# Patient Record
Sex: MALE | Race: WHITE | NOT HISPANIC OR LATINO | Employment: FULL TIME | URBAN - METROPOLITAN AREA
[De-identification: names, ages, dates, MRNs, and addresses within clinical notes are randomized per-mention and may not be internally consistent; named-entity substitution may affect disease eponyms.]

---

## 2017-01-25 ENCOUNTER — LAB VISIT (OUTPATIENT)
Dept: LAB | Facility: HOSPITAL | Age: 42
End: 2017-01-25
Attending: INTERNAL MEDICINE
Payer: COMMERCIAL

## 2017-01-25 ENCOUNTER — OFFICE VISIT (OUTPATIENT)
Dept: ENDOCRINOLOGY | Facility: CLINIC | Age: 42
End: 2017-01-25
Payer: COMMERCIAL

## 2017-01-25 ENCOUNTER — TELEPHONE (OUTPATIENT)
Dept: ENDOCRINOLOGY | Facility: CLINIC | Age: 42
End: 2017-01-25

## 2017-01-25 VITALS
HEART RATE: 76 BPM | SYSTOLIC BLOOD PRESSURE: 120 MMHG | WEIGHT: 219.44 LBS | DIASTOLIC BLOOD PRESSURE: 80 MMHG | HEIGHT: 71 IN | BODY MASS INDEX: 30.72 KG/M2

## 2017-01-25 DIAGNOSIS — E78.1 HYPERTRIGLYCERIDEMIA: ICD-10-CM

## 2017-01-25 DIAGNOSIS — E22.1 HYPERPROLACTINEMIA: ICD-10-CM

## 2017-01-25 DIAGNOSIS — E03.8 SECONDARY HYPOTHYROIDISM: ICD-10-CM

## 2017-01-25 DIAGNOSIS — R53.83 FATIGUE, UNSPECIFIED TYPE: ICD-10-CM

## 2017-01-25 DIAGNOSIS — E29.1 MALE HYPOGONADISM: ICD-10-CM

## 2017-01-25 DIAGNOSIS — E03.8 SECONDARY HYPOTHYROIDISM: Primary | ICD-10-CM

## 2017-01-25 LAB
25(OH)D3+25(OH)D2 SERPL-MCNC: 23 NG/ML
T4 FREE SERPL-MCNC: 0.84 NG/DL
TSH SERPL DL<=0.005 MIU/L-ACNC: 0.53 UIU/ML

## 2017-01-25 PROCEDURE — 36415 COLL VENOUS BLD VENIPUNCTURE: CPT | Mod: PO

## 2017-01-25 PROCEDURE — 82306 VITAMIN D 25 HYDROXY: CPT

## 2017-01-25 PROCEDURE — 99999 PR PBB SHADOW E&M-EST. PATIENT-LVL III: CPT | Mod: PBBFAC,,, | Performed by: INTERNAL MEDICINE

## 2017-01-25 PROCEDURE — 1159F MED LIST DOCD IN RCRD: CPT | Mod: S$GLB,,, | Performed by: INTERNAL MEDICINE

## 2017-01-25 PROCEDURE — 84439 ASSAY OF FREE THYROXINE: CPT

## 2017-01-25 PROCEDURE — 99214 OFFICE O/P EST MOD 30 MIN: CPT | Mod: S$GLB,,, | Performed by: INTERNAL MEDICINE

## 2017-01-25 PROCEDURE — 84443 ASSAY THYROID STIM HORMONE: CPT

## 2017-01-25 RX ORDER — ERGOCALCIFEROL 1.25 MG/1
50000 CAPSULE ORAL WEEKLY
Qty: 12 CAPSULE | Refills: 1 | Status: SHIPPED | OUTPATIENT
Start: 2017-01-25 | End: 2017-02-15 | Stop reason: SDUPTHER
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
## 2017-01-25 NOTE — MR AVS SNAPSHOT
[facility]  [address]  [facility] [address]  Phone: [phone]  Fax: [phone]                  Khadar Lorenzo   2017 8:00 AM   Office Visit    Description:  Male : 1975   Provider:  Robert Castillo MD   Department:  [facility]           Reason for Visit     hyperprolactinemia     Hypothyroidism           Diagnoses this Visit        Comments    Secondary hypothyroidism    -  Primary     Hypertriglyceridemia         BMI 29.0-29.9,adult         Hyperprolactinemia         Male hypogonadism         Fatigue, unspecified type                To Do List           Future Appointments        Provider Department Dept Phone    2017 10:35 AM LAB, [place] [facility] [phone]      Goals (5 Years of Data)     None      [facility]     [facility] Care [facility] -  Assistance  Registered nurses in the [facility] provide clinical advisement, health education, appointment booking, and other advisory services.  Call for this free service at [phone].             Medications           Message regarding Medications     Verify the changes and/or additions to your medication regime listed below are the same as discussed with your clinician today.  If any of these changes or additions are incorrect, please notify your healthcare provider.             Verify that the below list of medications is an accurate representation of the medications you are currently taking.  If none reported, the list may be blank. If incorrect, please contact your healthcare provider. Carry this list with you in case of emergency.           Current Medications     buPROPion (WELLBUTRIN XL) 300 MG 24 hr tablet Take 1 tablet (300 mg total) by mouth once daily.    citalopram (CELEXA) 40 MG tablet Take 1 tablet (40 mg total) by mouth once daily.    cyclobenzaprine (FLEXERIL) 10 MG tablet Take 10 mg by mouth 3 (three) times daily as  "needed for Muscle spasms.    fenofibrate 160 MG Tab Take 1 tablet (160 mg total) by mouth once daily.    ketoconazole (NIZORAL) 2 % shampoo Apply 1 application topically twice a week.    levothyroxine (SYNTHROID) 75 MCG tablet Take 1 tablet (75 mcg total) by mouth once daily.    OLUX-E 0.05 % topical foam Apply 0.05 % topically continuous prn.    omeprazole (PRILOSEC) 40 MG capsule TAKE ONE CAPSULE BY MOUTH ONCE DAILY    tadalafil (CIALIS) 20 MG Tab Take 1 tablet (20 mg total) by mouth daily as needed.    testosterone cypionate (DEPOTESTOTERONE CYPIONATE) 200 mg/mL injection INJECT 150 MG (0.75 MILLILITERS) INTRAMUSCULARLY EVERY 7 DAYS           Clinical Reference Information           Vital Signs - Last Recorded  Most recent update: 1/25/2017  8:18 AM by Cristin Jordan LPN    BP Pulse Ht Wt BMI    120/80 (BP Method: Manual) 76 5' 11" (1.803 m) 99.6 kg (219 lb 7.5 oz) 30.61 kg/m2      Blood Pressure          Most Recent Value    BP  120/80      Allergies as of 1/25/2017     No Known Allergies      Immunizations Administered on Date of Encounter - 1/25/2017     None      Orders Placed During Today's Visit     Future Labs/Procedures Expected by Expires    Prolactin  1/25/2017 3/26/2018    T4, free  1/25/2017 3/26/2018    T4, free  1/25/2017 3/26/2018    TSH  1/25/2017 3/26/2018    TSH  1/25/2017 3/26/2018    Vitamin D  1/25/2017 3/26/2018    Vitamin D  1/25/2017 3/26/2018      "

## 2017-02-15 ENCOUNTER — PATIENT MESSAGE (OUTPATIENT)
Dept: FAMILY MEDICINE | Facility: CLINIC | Age: 42
End: 2017-02-15

## 2017-02-15 ENCOUNTER — PATIENT MESSAGE (OUTPATIENT)
Dept: ENDOCRINOLOGY | Facility: CLINIC | Age: 42
End: 2017-02-15

## 2017-02-15 RX ORDER — TESTOSTERONE CYPIONATE 200 MG/ML
INJECTION, SOLUTION INTRAMUSCULAR
Refills: 0 | Status: CANCELLED | OUTPATIENT
Start: 2017-02-15

## 2017-02-15 RX ORDER — TESTOSTERONE CYPIONATE 200 MG/ML
INJECTION, SOLUTION INTRAMUSCULAR
Qty: 10 ML | Refills: 0 | Status: SHIPPED | OUTPATIENT
Start: 2017-02-15 | End: 2017-04-20 | Stop reason: SDUPTHER

## 2017-02-15 RX ORDER — ERGOCALCIFEROL 1.25 MG/1
50000 CAPSULE ORAL WEEKLY
Qty: 12 CAPSULE | Refills: 1 | Status: SHIPPED | OUTPATIENT
Start: 2017-02-15 | End: 2017-05-04

## 2017-02-15 NOTE — TELEPHONE ENCOUNTER
Patient is requesting testosterone refill to be sent to [facility]. Sent message to patient to let him know it would be sent to pharmacy.     Refill request. Ok to fill? Please advise. Thank you.

## 2017-02-15 NOTE — TELEPHONE ENCOUNTER
Pt states he was not aware that he was prescribed a new med after labs.  Would like the ergo sent to [facility] in [place] instead.  Med pended and routed to Dr. Castillo.

## 2017-03-07 RX ORDER — FENOFIBRATE 160 MG/1
TABLET ORAL
Qty: 90 TABLET | Refills: 0 | Status: SHIPPED | OUTPATIENT
Start: 2017-03-07 | End: 2017-04-21 | Stop reason: SDUPTHER

## 2017-03-07 RX ORDER — OMEPRAZOLE 40 MG/1
CAPSULE, DELAYED RELEASE ORAL
Qty: 90 CAPSULE | Refills: 0 | Status: SHIPPED | OUTPATIENT
Start: 2017-03-07 | End: 2017-06-27 | Stop reason: SDUPTHER

## 2017-03-08 RX ORDER — KETOCONAZOLE 20 MG/ML
1 SHAMPOO, SUSPENSION TOPICAL
Qty: 120 ML | Refills: 5 | Status: SHIPPED | OUTPATIENT
Start: 2017-03-09 | End: 2018-06-20 | Stop reason: SDUPTHER

## 2017-03-21 RX ORDER — BUPROPION HYDROCHLORIDE 300 MG/1
TABLET ORAL
Qty: 90 TABLET | Refills: 0 | Status: SHIPPED | OUTPATIENT
Start: 2017-03-21 | End: 2017-06-27 | Stop reason: SDUPTHER

## 2017-03-23 RX ORDER — CITALOPRAM 40 MG/1
TABLET, FILM COATED ORAL
Qty: 90 TABLET | Refills: 0 | Status: SHIPPED | OUTPATIENT
Start: 2017-03-23 | End: 2017-06-28 | Stop reason: SDUPTHER

## 2017-04-20 RX ORDER — TESTOSTERONE CYPIONATE 200 MG/ML
INJECTION, SOLUTION INTRAMUSCULAR
Qty: 10 ML | Refills: 0 | Status: SHIPPED | OUTPATIENT
Start: 2017-04-20 | End: 2017-07-21 | Stop reason: SDUPTHER

## 2017-04-21 ENCOUNTER — OFFICE VISIT (OUTPATIENT)
Dept: FAMILY MEDICINE | Facility: CLINIC | Age: 42
End: 2017-04-21
Payer: COMMERCIAL

## 2017-04-21 VITALS
TEMPERATURE: 98 F | SYSTOLIC BLOOD PRESSURE: 122 MMHG | RESPIRATION RATE: 18 BRPM | OXYGEN SATURATION: 99 % | DIASTOLIC BLOOD PRESSURE: 88 MMHG | WEIGHT: 227.06 LBS | HEART RATE: 80 BPM | HEIGHT: 71 IN | BODY MASS INDEX: 31.79 KG/M2

## 2017-04-21 DIAGNOSIS — M94.0 ACUTE COSTOCHONDRITIS: Primary | ICD-10-CM

## 2017-04-21 DIAGNOSIS — E78.2 MIXED HYPERLIPIDEMIA: ICD-10-CM

## 2017-04-21 PROCEDURE — 1160F RVW MEDS BY RX/DR IN RCRD: CPT | Mod: S$GLB,,, | Performed by: NURSE PRACTITIONER

## 2017-04-21 PROCEDURE — 99214 OFFICE O/P EST MOD 30 MIN: CPT | Mod: S$GLB,,, | Performed by: NURSE PRACTITIONER

## 2017-04-21 RX ORDER — KETOROLAC TROMETHAMINE 10 MG/1
10 TABLET, FILM COATED ORAL 3 TIMES DAILY
Qty: 15 TABLET | Refills: 0 | Status: SHIPPED | OUTPATIENT
Start: 2017-04-21 | End: 2017-04-26

## 2017-04-21 NOTE — MR AVS SNAPSHOT
[facility]  [address] [address] [address]  [facility] [address]  Phone: [phone]  Fax: [phone]                  Khadar Lorenzo   2017 1:00 PM   Office Visit    Description:  Male : 1975   Provider:  Talita Strickland NP   Department:  [facility]           Reason for Visit     Chest Pain           Diagnoses this Visit        Comments    Acute costochondritis    -  Primary     Mixed hyperlipidemia                To Do List           Goals (5 Years of Data)     None       These Medications        Disp Refills Start End    ketorolac (TORADOL) 10 mg tablet 15 tablet 0 2017    Take 1 tablet (10 mg total) by mouth 3 (three) times daily. - Oral    Pharmacy: [facility] [id number]  #: [phone]         [facility]     [facility] On Call Nurse Care Line -  Assistance  Unless otherwise directed by your provider, please contact [facility], our nurse care line that is available for  assistance.     Registered nurses in the [facility] provide: appointment scheduling, clinical advisement, health education, and other advisory services.  Call: [phone] (toll free)               Medications           Message regarding Medications     Verify the changes and/or additions to your medication regime listed below are the same as discussed with your clinician today.  If any of these changes or additions are incorrect, please notify your healthcare provider.        START taking these NEW medications        Refills    ketorolac (TORADOL) 10 mg tablet 0    Sig: Take 1 tablet (10 mg total) by mouth 3 (three) times daily.    Class: Normal    Route: Oral           Verify that the below list of medications is an accurate representation of the medications you are currently taking.  If none reported, the list may be blank. If incorrect, please contact your healthcare  "provider. Carry this list with you in case of emergency.           Current Medications     buPROPion (WELLBUTRIN XL) 300 MG 24 hr tablet TAKE ONE TABLET BY MOUTH ONCE DAILY    citalopram (CELEXA) 40 MG tablet TAKE ONE TABLET BY MOUTH ONCE DAILY    ergocalciferol (ERGOCALCIFEROL) 50,000 unit Cap Take 1 capsule (50,000 Units total) by mouth once a week.    fenofibrate 160 MG Tab Take 1 tablet (160 mg total) by mouth once daily.    ketoconazole (NIZORAL) 2 % shampoo Apply 1 application topically twice a week.    levothyroxine (SYNTHROID) 75 MCG tablet Take 1 tablet (75 mcg total) by mouth once daily.    omeprazole (PRILOSEC) 40 MG capsule TAKE ONE CAPSULE BY MOUTH ONCE DAILY    testosterone cypionate (DEPOTESTOTERONE CYPIONATE) 200 mg/mL injection INJECT 150 MG (0.75 MILLILITERS) INTRAMUSCULARLY EVERY 7 DAYS    cyclobenzaprine (FLEXERIL) 10 MG tablet Take 10 mg by mouth 3 (three) times daily as needed for Muscle spasms.    ketorolac (TORADOL) 10 mg tablet Take 1 tablet (10 mg total) by mouth 3 (three) times daily.    OLUX-E 0.05 % topical foam Apply 0.05 % topically continuous prn.    tadalafil (CIALIS) 20 MG Tab Take 1 tablet (20 mg total) by mouth daily as needed.           Clinical Reference Information           Your Vitals Were     BP Pulse Temp Resp Height Weight    122/88 80 98.4 °F (36.9 °C) 18 5' 11" (1.803 m) 103 kg (227 lb 1.2 oz)    SpO2 BMI             99% 31.67 kg/m2         Blood Pressure          Most Recent Value    BP  122/88      Allergies as of 4/21/2017     No Known Allergies      Immunizations Administered on Date of Encounter - 4/21/2017     None      Orders Placed During Today's Visit     Future Labs/Procedures Expected by Expires    CBC auto differential  4/21/2017 4/22/2018    Comprehensive metabolic panel  4/21/2017 4/22/2018    Lipid panel  4/21/2017 4/22/2018      Language Assistance Services     ATTENTION: Language assistance services are available, free of charge. Please call " [phone].      ATENCIÓN: Si habla español, tiene a navarro disposición servicios gratuitos de asistencia lingüística. Llame al [phone].     CHÚ Ý: N?u b?n nói Ti?ng Vi?t, có các d?ch v? h? tr? ngôn ng? mi?n phí dành cho b?n. G?i s? [phone].         [facility] complies with applicable Federal civil rights laws and does not discriminate on the basis of race, color, national origin, age, disability, or sex.

## 2017-04-23 NOTE — PROGRESS NOTES
"Subjective:       Patient ID: Khadar Lorenzo is a 41 y.o. male.    Chief Complaint: Chest Pain    HPI He was just getting offshore when he has some pain in his left chest wall area. States it was with movement. Was concerned so was flown back in for the EMT's to check him out. He has normal EKG strip done but BP was a little elevated. He feels this was due to anxiety. He states he has soreness to the chest wall with movements. Had done some heavy lifting. Has not had any cold, allergy type symptoms. He states no radiation of pain into shoulder, jaw or down arm. States it is a constant soreness, worse with movement and feels it with palpation. NO other concerns. Due for labs. BP has been in good range. See ROS.    The following portion of the patients history was reviewed and updated as appropriate: allergies, current medications, past medical and surgical history. Past social history and problem list reviewed. Family PMH and Past social history reviewed. Tobacco, Illicit drug use reviewed.     Review of Systems    Constitutional: No fatigue or fever    HENT: no sore throat or nasal congestion. No voice changes  no sneezing or runny nose  Eyes: No vision changes, blurred vision  Skin: no rashes or lesions  Respiratory:   No SOB, Wheezing, cough  Cardiovascular:   No CP, Palpitations  Gastrointestinal:   No N/V/D. No abdominal pain, weight stable. Appetite good.   Musculoskeletal:   No joint pain  No change in gait or coordination. See HPI.  Neurological:   No dizziness. No headaches  Hematological: No abnormal bruising or bleeding    Psychiatric/Behavioral Negative for suicidal ideas.  Denies feelings of depression. No thoughts of wanting to harm self or others.     Objective:     /88  Pulse 80  Temp 98.4 °F (36.9 °C)  Resp 18  Ht 5' 11" (1.803 m)  Wt 103 kg (227 lb 1.2 oz)  SpO2 99%  BMI 31.67 kg/m2     Physical Exam    Constitutional: oriented to person, place, and time. well-developed and " well-nourished.   HENT:  Nares patent. Throat clear.   Head: Normocephalic.   Eyes: Conjunctivae are normal. Pupils are equal, round, and reactive to light.   Neck: Normal range of motion. Neck supple. No tracheal deviation present. No thyromegaly present. no enlarged or tender anterior cervical lymph nodes.  Cardiovascular: Normal rate, regular rhythm and normal heart sounds.  No murmurs or gallops.  Pulmonary/Chest: Effort normal and breath sounds normal. No respiratory distress. No wheezes. No rales or rhonchi  Abdominal: Soft. Bowel sounds are normal. No distension. There is no tenderness.   Musculoskeletal: Normal range of motion. Gait and coordination normal. Tenderness to chest wall to mid sternal and bilateral CW areas with palpation. Does not present as cardiac. More muscle strain.    Neurological: oriented to person, place, and time.   Skin: Skin is warm and dry. No rashes or lesions  Psychiatric: Normal mood and affect.Behavior is normal. Judgment and thought content normal.   Assessment:       1. Acute costochondritis    2. Mixed hyperlipidemia        Plan:         Khadar was seen today for chest pain.    Diagnoses and all orders for this visit:    Acute costochondritis:  toradol TID for 5 days. Avoid heavy lifting. If not improving or radiation of pain into left shoulder, down arm go to ER.     Mixed hyperlipidemia: Low fat, low carb diet. Due for routine labs.  -     CBC auto differential; Future  -     Comprehensive metabolic panel; Future  -     Lipid panel; Future    Other orders  -     ketorolac (TORADOL) 10 mg tablet; Take 1 tablet (10 mg total) by mouth 3 (three) times daily.    Continue current medication  Take medications only as prescribed  Healthy diet, exercise  Adequate rest  Adequate hydration  Avoid allergens  Avoid excessive caffeine

## 2017-06-07 ENCOUNTER — PATIENT MESSAGE (OUTPATIENT)
Dept: FAMILY MEDICINE | Facility: CLINIC | Age: 42
End: 2017-06-07

## 2017-06-07 ENCOUNTER — TELEPHONE (OUTPATIENT)
Dept: FAMILY MEDICINE | Facility: CLINIC | Age: 42
End: 2017-06-07

## 2017-06-07 DIAGNOSIS — L98.9 SKIN LESIONS, GENERALIZED: Primary | ICD-10-CM

## 2017-06-07 NOTE — TELEPHONE ENCOUNTER
Patient scheduled for soonest available with Dr. Chinchilla which he is currently unable to make due to conflicting work schedule, message sent to staff for earlier appointment. Beginning early September, Dr. Chinchilla will be out on maternity leave the remainder of the year. Please advise.

## 2017-06-07 NOTE — TELEPHONE ENCOUNTER
I put in referral to [facility], Dr. Terry. Please call him to schedule.

## 2017-06-07 NOTE — TELEPHONE ENCOUNTER
See below message from Dr. Chinchilla staff:    Sorry . We have nothing sooner . Will place on cancellation list . But we have so many on list , I doubt we can get in sooner. Pt's that want soon appt we are recommending Dr Betancourt in [place] .

## 2017-06-07 NOTE — TELEPHONE ENCOUNTER
Appt scheduled for soonest available, message sent to staff requesting earlier appointment. Attempted to reach patient via contact number provided, no answer. LM including contact information for return call. MyChart msg sent.

## 2017-06-27 NOTE — TELEPHONE ENCOUNTER
Celexa was routed to Dr. Rao, so I am unable to edit/remove. Unsure if you will be filling this also. All meds pended for 90 day supply.  Prilosec and Wellbutrin last refilled by your 3/2017  Celexa last filled by Dr. Rao 3/23/17.  LOV 4/21/17

## 2017-06-28 ENCOUNTER — PATIENT MESSAGE (OUTPATIENT)
Dept: FAMILY MEDICINE | Facility: CLINIC | Age: 42
End: 2017-06-28

## 2017-06-28 RX ORDER — BUPROPION HYDROCHLORIDE 300 MG/1
300 TABLET ORAL DAILY
Qty: 90 TABLET | Refills: 1 | Status: SHIPPED | OUTPATIENT
Start: 2017-06-28 | End: 2018-01-12 | Stop reason: SDUPTHER

## 2017-06-28 RX ORDER — FENOFIBRATE 160 MG/1
TABLET ORAL
Qty: 90 TABLET | Refills: 1 | Status: SHIPPED | OUTPATIENT
Start: 2017-06-28 | End: 2018-01-31 | Stop reason: SDUPTHER

## 2017-06-28 RX ORDER — CITALOPRAM 40 MG/1
40 TABLET, FILM COATED ORAL DAILY
Qty: 90 TABLET | Refills: 1 | Status: SHIPPED | OUTPATIENT
Start: 2017-06-28 | End: 2018-01-12 | Stop reason: SDUPTHER

## 2017-06-28 RX ORDER — OMEPRAZOLE 40 MG/1
40 CAPSULE, DELAYED RELEASE ORAL DAILY
Qty: 90 CAPSULE | Refills: 1 | Status: SHIPPED | OUTPATIENT
Start: 2017-06-28 | End: 2018-01-12 | Stop reason: SDUPTHER

## 2017-07-21 ENCOUNTER — PATIENT MESSAGE (OUTPATIENT)
Dept: FAMILY MEDICINE | Facility: CLINIC | Age: 42
End: 2017-07-21

## 2017-07-21 RX ORDER — TESTOSTERONE CYPIONATE 200 MG/ML
INJECTION, SOLUTION INTRAMUSCULAR
Qty: 10 ML | Refills: 0 | Status: SHIPPED | OUTPATIENT
Start: 2017-07-21 | End: 2017-10-27 | Stop reason: SDUPTHER

## 2017-08-01 ENCOUNTER — PATIENT MESSAGE (OUTPATIENT)
Dept: FAMILY MEDICINE | Facility: CLINIC | Age: 42
End: 2017-08-01

## 2017-08-04 ENCOUNTER — LAB VISIT (OUTPATIENT)
Dept: LAB | Facility: HOSPITAL | Age: 42
End: 2017-08-04
Payer: COMMERCIAL

## 2017-08-04 DIAGNOSIS — E78.2 MIXED HYPERLIPIDEMIA: ICD-10-CM

## 2017-08-04 LAB
ALBUMIN SERPL BCP-MCNC: 3.9 G/DL
ALP SERPL-CCNC: 43 U/L
ALT SERPL W/O P-5'-P-CCNC: 35 U/L
ANION GAP SERPL CALC-SCNC: 8 MMOL/L
AST SERPL-CCNC: 24 U/L
BASOPHILS # BLD AUTO: 0.04 K/UL
BASOPHILS NFR BLD: 0.5 %
BILIRUB SERPL-MCNC: 0.7 MG/DL
BUN SERPL-MCNC: 8 MG/DL
CALCIUM SERPL-MCNC: 9 MG/DL
CHLORIDE SERPL-SCNC: 104 MMOL/L
CHOLEST/HDLC SERPL: 4.8 {RATIO}
CO2 SERPL-SCNC: 27 MMOL/L
CREAT SERPL-MCNC: 1.1 MG/DL
DIFFERENTIAL METHOD: ABNORMAL
EOSINOPHIL # BLD AUTO: 0.2 K/UL
EOSINOPHIL NFR BLD: 2.4 %
ERYTHROCYTE [DISTWIDTH] IN BLOOD BY AUTOMATED COUNT: 13.5 %
EST. GFR  (AFRICAN AMERICAN): >60 ML/MIN/1.73 M^2
EST. GFR  (NON AFRICAN AMERICAN): >60 ML/MIN/1.73 M^2
GLUCOSE SERPL-MCNC: 100 MG/DL
HCT VFR BLD AUTO: 49 %
HDL/CHOLESTEROL RATIO: 20.9 %
HDLC SERPL-MCNC: 148 MG/DL
HDLC SERPL-MCNC: 31 MG/DL
HGB BLD-MCNC: 16.2 G/DL
LDLC SERPL CALC-MCNC: 72.6 MG/DL
LYMPHOCYTES # BLD AUTO: 2.9 K/UL
LYMPHOCYTES NFR BLD: 36.3 %
MCH RBC QN AUTO: 26.7 PG
MCHC RBC AUTO-ENTMCNC: 33.1 G/DL
MCV RBC AUTO: 81 FL
MONOCYTES # BLD AUTO: 0.7 K/UL
MONOCYTES NFR BLD: 8.3 %
NEUTROPHILS # BLD AUTO: 4.2 K/UL
NEUTROPHILS NFR BLD: 51.9 %
NONHDLC SERPL-MCNC: 117 MG/DL
PLATELET # BLD AUTO: 180 K/UL
PMV BLD AUTO: 9.8 FL
POTASSIUM SERPL-SCNC: 4.1 MMOL/L
PROT SERPL-MCNC: 6.6 G/DL
RBC # BLD AUTO: 6.07 M/UL
SODIUM SERPL-SCNC: 139 MMOL/L
TRIGL SERPL-MCNC: 222 MG/DL
WBC # BLD AUTO: 8 K/UL

## 2017-08-04 PROCEDURE — 80053 COMPREHEN METABOLIC PANEL: CPT

## 2017-08-04 PROCEDURE — 80061 LIPID PANEL: CPT

## 2017-08-04 PROCEDURE — 36415 COLL VENOUS BLD VENIPUNCTURE: CPT | Mod: PO

## 2017-08-04 PROCEDURE — 85025 COMPLETE CBC W/AUTO DIFF WBC: CPT

## 2017-08-07 ENCOUNTER — PATIENT MESSAGE (OUTPATIENT)
Dept: FAMILY MEDICINE | Facility: CLINIC | Age: 42
End: 2017-08-07

## 2017-08-08 ENCOUNTER — PATIENT MESSAGE (OUTPATIENT)
Dept: FAMILY MEDICINE | Facility: CLINIC | Age: 42
End: 2017-08-08

## 2017-08-28 RX ORDER — LEVOTHYROXINE SODIUM 75 UG/1
75 TABLET ORAL DAILY
Qty: 30 TABLET | Refills: 11 | Status: SHIPPED | OUTPATIENT
Start: 2017-08-28 | End: 2018-10-19 | Stop reason: SDUPTHER

## 2017-10-24 RX ORDER — TESTOSTERONE CYPIONATE 200 MG/ML
INJECTION, SOLUTION INTRAMUSCULAR
Qty: 10 ML | Refills: 0 | OUTPATIENT
Start: 2017-10-24

## 2017-10-24 NOTE — TELEPHONE ENCOUNTER
He is due to follow up with endocrinology.  Please advise him to schedule that appointment.  He is  Due for his 6 month visit with me. Testosterone is a controlled medication and he has to be seen every 6 months to continue getting it.  Please have him schedule appointment and will give refills at that visit.

## 2017-10-27 ENCOUNTER — TELEPHONE (OUTPATIENT)
Dept: FAMILY MEDICINE | Facility: CLINIC | Age: 42
End: 2017-10-27

## 2017-10-27 ENCOUNTER — OFFICE VISIT (OUTPATIENT)
Dept: FAMILY MEDICINE | Facility: CLINIC | Age: 42
End: 2017-10-27
Payer: COMMERCIAL

## 2017-10-27 VITALS
DIASTOLIC BLOOD PRESSURE: 86 MMHG | HEIGHT: 71 IN | RESPIRATION RATE: 12 BRPM | BODY MASS INDEX: 30.69 KG/M2 | WEIGHT: 219.19 LBS | HEART RATE: 76 BPM | SYSTOLIC BLOOD PRESSURE: 126 MMHG | TEMPERATURE: 98 F

## 2017-10-27 DIAGNOSIS — E78.2 MIXED HYPERLIPIDEMIA: ICD-10-CM

## 2017-10-27 DIAGNOSIS — E34.9 HYPOTESTOSTERONEMIA: Primary | ICD-10-CM

## 2017-10-27 DIAGNOSIS — E03.4 HYPOTHYROIDISM DUE TO ACQUIRED ATROPHY OF THYROID: ICD-10-CM

## 2017-10-27 DIAGNOSIS — Z12.5 PROSTATE CANCER SCREENING: ICD-10-CM

## 2017-10-27 LAB
ALBUMIN SERPL BCP-MCNC: 4.2 G/DL
ALP SERPL-CCNC: 51 U/L
ALT SERPL W/O P-5'-P-CCNC: 40 U/L
ANION GAP SERPL CALC-SCNC: 11 MMOL/L
AST SERPL-CCNC: 29 U/L
BASOPHILS # BLD AUTO: 0.07 K/UL
BASOPHILS NFR BLD: 1 %
BILIRUB SERPL-MCNC: 0.8 MG/DL
BUN SERPL-MCNC: 15 MG/DL
CALCIUM SERPL-MCNC: 9.6 MG/DL
CHLORIDE SERPL-SCNC: 102 MMOL/L
CHOLEST SERPL-MCNC: 136 MG/DL
CHOLEST/HDLC SERPL: 4 {RATIO}
CO2 SERPL-SCNC: 25 MMOL/L
COMPLEXED PSA SERPL-MCNC: 1.6 NG/ML
CREAT SERPL-MCNC: 1.3 MG/DL
DIFFERENTIAL METHOD: ABNORMAL
EOSINOPHIL # BLD AUTO: 0.1 K/UL
EOSINOPHIL NFR BLD: 1.2 %
ERYTHROCYTE [DISTWIDTH] IN BLOOD BY AUTOMATED COUNT: 13.4 %
EST. GFR  (AFRICAN AMERICAN): >60 ML/MIN/1.73 M^2
EST. GFR  (NON AFRICAN AMERICAN): >60 ML/MIN/1.73 M^2
GLUCOSE SERPL-MCNC: 91 MG/DL
HCT VFR BLD AUTO: 51.6 %
HDLC SERPL-MCNC: 34 MG/DL
HDLC SERPL: 25 %
HGB BLD-MCNC: 16.5 G/DL
IMM GRANULOCYTES # BLD AUTO: 0.04 K/UL
IMM GRANULOCYTES NFR BLD AUTO: 0.6 %
LDLC SERPL CALC-MCNC: 72 MG/DL
LYMPHOCYTES # BLD AUTO: 2.3 K/UL
LYMPHOCYTES NFR BLD: 33.7 %
MCH RBC QN AUTO: 26.5 PG
MCHC RBC AUTO-ENTMCNC: 32 G/DL
MCV RBC AUTO: 83 FL
MONOCYTES # BLD AUTO: 0.6 K/UL
MONOCYTES NFR BLD: 8.5 %
NEUTROPHILS # BLD AUTO: 3.8 K/UL
NEUTROPHILS NFR BLD: 55 %
NONHDLC SERPL-MCNC: 102 MG/DL
NRBC BLD-RTO: 0 /100 WBC
PLATELET # BLD AUTO: 233 K/UL
PMV BLD AUTO: 9.9 FL
POTASSIUM SERPL-SCNC: 4.4 MMOL/L
PROT SERPL-MCNC: 7.3 G/DL
RBC # BLD AUTO: 6.23 M/UL
SODIUM SERPL-SCNC: 138 MMOL/L
TESTOST SERPL-MCNC: 181 NG/DL
TRIGL SERPL-MCNC: 150 MG/DL
TSH SERPL DL<=0.005 MIU/L-ACNC: 0.92 UIU/ML
WBC # BLD AUTO: 6.83 K/UL

## 2017-10-27 PROCEDURE — 99214 OFFICE O/P EST MOD 30 MIN: CPT | Mod: S$GLB,,, | Performed by: NURSE PRACTITIONER

## 2017-10-27 PROCEDURE — 36415 COLL VENOUS BLD VENIPUNCTURE: CPT | Mod: S$GLB,,, | Performed by: INTERNAL MEDICINE

## 2017-10-27 RX ORDER — TESTOSTERONE CYPIONATE 200 MG/ML
INJECTION, SOLUTION INTRAMUSCULAR
Qty: 10 ML | Refills: 0 | Status: SHIPPED | OUTPATIENT
Start: 2017-10-27 | End: 2018-02-23 | Stop reason: SDUPTHER

## 2017-10-27 NOTE — PROGRESS NOTES
Venipuncture performed with 23 gauge butterfly, x's 1 attempt,  to L Cephalic vein.  Specimens collected per orders.      Pressure dressing applied to site, instructed patient to remove dressing in 10-15 minutes, OK to re-adjust dressing if pressure causing any discomfort, to observe closely for numbness and/or discoloration to hand or fingers, and to notify provider if bleeding persists after applying constant pressure lasting 30 minutes.

## 2017-10-27 NOTE — TELEPHONE ENCOUNTER
----- Message from Alanna Davison sent at 10/27/2017 10:43 AM CDT -----  Contact: Patient  Khadar, patient [phone] returning the nurse's call. Transferred to POD. Please advise. Thanks.

## 2017-10-31 NOTE — PROGRESS NOTES
"Subjective:       Patient ID: Khadar Lorenzo is a 41 y.o. male.    Chief Complaint: Medication Refill    HPI here for medication review and refills. States he is doing well. He has not had his testosterone in about two weeks so he is more fatigued. He finds it really makes a difference. He has been worked up by Urology and Endocrinology and approved to be on the testosterone. He is taking his synthroid as directed. He is due for routine labs. No oher concerns. See ROS.    The following portion of the patients history was reviewed and updated as appropriate: allergies, current medications, past medical and surgical history. Past social history and problem list reviewed. Family PMH and Past social history reviewed. Tobacco, Illicit drug use reviewed.     Review of Systems  Constitutional: No fatigue or fever    HENT: no sore throat or nasal congestion. No voice changes    Eyes: No vision changes, blurred vision  Skin: no rashes or lesions  Respiratory:   No SOB, Wheezing, cough  Cardiovascular:   No CP, Palpitations  Gastrointestinal:   No N/V/D. No abdominal pain, weight stable. Appetite good.   Genitourinary:   No dysuria, urgency or frequency. No change in bowels. No blood in stools.   Musculoskeletal:   No joint pain  No change in gait or coordination. .  Neurological:   No dizziness. No headaches  Hematological: No abnormal bruising or bleeding    Psychiatric/Behavioral Negative for suicidal ideas.  Denies feelings of depression. No thoughts of wanting to harm self or others.     Objective:     /86   Pulse 76   Temp 98.4 °F (36.9 °C) (Oral)   Resp 12   Ht 5' 11" (1.803 m)   Wt 99.4 kg (219 lb 3.2 oz)   BMI 30.57 kg/m²      Physical Exam     Constitutional: oriented to person, place, and time. well-developed and well-nourished.   HENT: normal  Head: Normocephalic.   Eyes: Conjunctivae are normal. Pupils are equal, round, and reactive to light.   Neck: Normal range of motion. Neck supple. No " tracheal deviation present. No thyromegaly present.   Cardiovascular: Normal rate, regular rhythm and normal heart sounds.    Pulmonary/Chest: Effort normal and breath sounds normal. No respiratory distress. No wheezes.   Abdominal: Soft. Bowel sounds are normal. No distension. There is no tenderness.   Musculoskeletal: Normal range of motion. Gait and coordination normal   Neurological: oriented to person, place, and time.   Skin: Skin is warm and dry. No rashes or lesions  Psychiatric: Normal mood and affect.Behavior is normal. Judgment and thought content normal.   Assessment:       1. Hypotestosteronemia    2. Mixed hyperlipidemia    3. Prostate cancer screening    4. Hypothyroidism due to acquired atrophy of thyroid        Plan:         Khadar was seen today for medication refill.    Diagnoses and all orders for this visit:    Hypotestosteronemia: will check testosterone level. Refill medication.   -     Testosterone; Future  -     Testosterone    Mixed hyperlipidemia: due for routine labs.  -     CBC auto differential  -     Comprehensive metabolic panel  -     Lipid panel    Prostate cancer screening  -     PSA, Screening    Hypothyroidism due to acquired atrophy of thyroid: tolerating medication well. Continue current dose for now. Due for labs.  -     TSH    Other orders  -     testosterone cypionate (DEPOTESTOTERONE CYPIONATE) 200 mg/mL injection; INJECT 150 MG (0.75 MILLILITERS) INTRAMUSCULARLY EVERY 7 DAYS    Continue current medication  Take medications only as prescribed  Healthy diet, exercise  Adequate rest  Adequate hydration  Avoid allergens  Avoid excessive caffeine

## 2017-12-21 ENCOUNTER — TELEPHONE (OUTPATIENT)
Dept: FAMILY MEDICINE | Facility: CLINIC | Age: 42
End: 2017-12-21

## 2017-12-21 NOTE — TELEPHONE ENCOUNTER
----- Message from David Burns sent at 12/20/2017 12:29 PM CST -----  Contact: Wife, Nyasia Murrell want to speak with a nurse regarding scheduling appointment for patient today for coughing and running fever please call back at [phone]

## 2017-12-21 NOTE — TELEPHONE ENCOUNTER
She can but it won't necessarily help.  There is always a  New strand and this vaccine will not guarantee she still won't get the flu.

## 2017-12-21 NOTE — TELEPHONE ENCOUNTER
----- Message from David Burns sent at 12/20/2017 12:29 PM CST -----  Contact: Wife, Nyasia Murrell want to speak with a nurse regarding scheduling appointment for patient today for coughing and running fever please call back at [phone]

## 2017-12-21 NOTE — TELEPHONE ENCOUNTER
Spoke to pt wife. Pt wife states that pt went to Urgent Care last night and was diagnosed with the flu. Pt wife states they gave pt tamiflu and states she got tamiflu as well. Pt wondering if she should get a flu shot? Please advise.

## 2017-12-21 NOTE — TELEPHONE ENCOUNTER
Pt called to schedule an appointment no answer message left to return call to clinic to schedule if needed

## 2017-12-26 ENCOUNTER — CLINICAL SUPPORT (OUTPATIENT)
Dept: URGENT CARE | Facility: CLINIC | Age: 42
End: 2017-12-26

## 2017-12-26 DIAGNOSIS — Z00.00 PHYSICAL EXAM: Primary | ICD-10-CM

## 2017-12-26 PROCEDURE — 99499 UNLISTED E&M SERVICE: CPT | Mod: S$GLB,,, | Performed by: PREVENTIVE MEDICINE

## 2018-01-12 RX ORDER — CITALOPRAM 40 MG/1
TABLET, FILM COATED ORAL
Qty: 90 TABLET | Refills: 1 | Status: SHIPPED | OUTPATIENT
Start: 2018-01-12 | End: 2018-08-02 | Stop reason: ALTCHOICE

## 2018-01-12 RX ORDER — BUPROPION HYDROCHLORIDE 300 MG/1
TABLET ORAL
Qty: 90 TABLET | Refills: 1 | Status: SHIPPED | OUTPATIENT
Start: 2018-01-12 | End: 2018-07-10 | Stop reason: SDUPTHER

## 2018-01-12 RX ORDER — OMEPRAZOLE 40 MG/1
CAPSULE, DELAYED RELEASE ORAL
Qty: 90 CAPSULE | Refills: 1 | Status: SHIPPED | OUTPATIENT
Start: 2018-01-12 | End: 2018-07-10 | Stop reason: SDUPTHER

## 2018-01-31 RX ORDER — FENOFIBRATE 160 MG/1
TABLET ORAL
Qty: 90 TABLET | Refills: 1 | Status: SHIPPED | OUTPATIENT
Start: 2018-01-31 | End: 2018-09-04 | Stop reason: SDUPTHER

## 2018-02-26 RX ORDER — TESTOSTERONE CYPIONATE 200 MG/ML
INJECTION, SOLUTION INTRAMUSCULAR
Qty: 10 ML | Refills: 0 | Status: SHIPPED | OUTPATIENT
Start: 2018-02-26 | End: 2018-06-21 | Stop reason: SDUPTHER

## 2018-06-20 RX ORDER — KETOCONAZOLE 20 MG/ML
SHAMPOO, SUSPENSION TOPICAL
Qty: 120 ML | Refills: 5 | Status: SHIPPED | OUTPATIENT
Start: 2018-06-20 | End: 2021-12-19 | Stop reason: CLARIF

## 2018-06-21 RX ORDER — TESTOSTERONE CYPIONATE 200 MG/ML
INJECTION, SOLUTION INTRAMUSCULAR
Qty: 10 ML | Refills: 0 | Status: SHIPPED | OUTPATIENT
Start: 2018-06-21 | End: 2018-11-05 | Stop reason: SDUPTHER

## 2018-07-11 RX ORDER — BUPROPION HYDROCHLORIDE 300 MG/1
TABLET ORAL
Qty: 90 TABLET | Refills: 1 | Status: SHIPPED | OUTPATIENT
Start: 2018-07-11 | End: 2018-12-18 | Stop reason: SDUPTHER

## 2018-07-11 RX ORDER — OMEPRAZOLE 40 MG/1
CAPSULE, DELAYED RELEASE ORAL
Qty: 90 CAPSULE | Refills: 1 | Status: SHIPPED | OUTPATIENT
Start: 2018-07-11 | End: 2019-01-29 | Stop reason: SDUPTHER

## 2018-08-02 ENCOUNTER — OFFICE VISIT (OUTPATIENT)
Dept: FAMILY MEDICINE | Facility: CLINIC | Age: 43
End: 2018-08-02
Payer: COMMERCIAL

## 2018-08-02 VITALS
HEART RATE: 72 BPM | BODY MASS INDEX: 30.55 KG/M2 | RESPIRATION RATE: 18 BRPM | HEIGHT: 71 IN | DIASTOLIC BLOOD PRESSURE: 78 MMHG | TEMPERATURE: 98 F | WEIGHT: 218.19 LBS | SYSTOLIC BLOOD PRESSURE: 124 MMHG

## 2018-08-02 DIAGNOSIS — F43.29 MIXED EMOTIONAL FEATURES AS ADJUSTMENT REACTION: ICD-10-CM

## 2018-08-02 DIAGNOSIS — Z00.00 ANNUAL PHYSICAL EXAM: Primary | ICD-10-CM

## 2018-08-02 DIAGNOSIS — E34.9 HYPOTESTOSTERONEMIA: ICD-10-CM

## 2018-08-02 DIAGNOSIS — E78.2 MIXED HYPERLIPIDEMIA: ICD-10-CM

## 2018-08-02 DIAGNOSIS — E03.4 HYPOTHYROIDISM DUE TO ACQUIRED ATROPHY OF THYROID: ICD-10-CM

## 2018-08-02 DIAGNOSIS — K21.9 GASTROESOPHAGEAL REFLUX DISEASE WITHOUT ESOPHAGITIS: ICD-10-CM

## 2018-08-02 DIAGNOSIS — L98.9 GENERALIZED SKIN LESIONS: ICD-10-CM

## 2018-08-02 PROCEDURE — 99396 PREV VISIT EST AGE 40-64: CPT | Mod: S$GLB,,, | Performed by: NURSE PRACTITIONER

## 2018-08-02 NOTE — PROGRESS NOTES
Subjective:       Patient ID: Khadar Lorenzo is a 42 y.o. male.    Chief Complaint: Annual Exam    HPI Patient is here for annual exam.  States he has been doing very well on current medications.  He has good blood pressure control.  He is doing well on the Wellbutrin for depression and anxiety.  He is not taking the citalopram any more.  He continues to get his testosterone injections, last testosterone level was 181.  He is due to repeat labs.  He has good control of his acid reflux with current medication.  He is due to repeat thyroid level.  States he is sleeping well.  He has some generalized skin lesions that he would like to see Dermatology for.  He has had have some moles removed in the past.  He denies any specific concerns today.  See review of systems.    The following portion of the patients history was reviewed and updated as appropriate: allergies, current medications, past medical and surgical history. Past social history and problem list reviewed. Family PMH and Past social history reviewed. Tobacco, Illicit drug use reviewed.     Review of Systems  Constitutional: No fatigue or fever    HENT: no sore throat or nasal congestion. No voice changes    Eyes: No vision changes, blurred vision  Skin: no rashes or lesions  Respiratory:   No SOB, Wheezing, cough  Cardiovascular:   No CP, Palpitations  Gastrointestinal:   No N/V/D. No abdominal pain, weight stable. Appetite good.   Genitourinary:   No dysuria, urgency or frequency. No change in bowels. No blood in stools.   Musculoskeletal:   No joint pain  No change in gait or coordination. .  Neurological:   No dizziness. No headaches  Hematological: No abnormal bruising or bleeding    Psychiatric/Behavioral Negative for suicidal ideas.  Denies feelings of depression. No thoughts of wanting to harm self or others.     Objective:     /78 (BP Location: Left arm, Patient Position: Sitting, BP Method: Medium (Manual))   Pulse 72   Temp 97.8 °F  "(36.6 °C) (Oral)   Resp 18   Ht 5' 11" (1.803 m)   Wt 99 kg (218 lb 3.2 oz)   BMI 30.43 kg/m²      Physical Exam     Constitutional: oriented to person, place, and time. well-developed and well-nourished.   Head: Normocephalic.   Eyes: Conjunctivae are normal. Pupils are equal, round, and reactive to light.   Neck: Normal range of motion. Neck supple. No tracheal deviation present. No thyromegaly present.   Cardiovascular: Normal rate, regular rhythm and normal heart sounds.    Pulmonary/Chest: Effort normal and breath sounds normal. No respiratory distress. No wheezes.   Abdominal: Soft. Bowel sounds are normal. No distension. There is no tenderness.   Musculoskeletal: Normal range of motion.  Gait and coordination normal.   Neurological: oriented to person, place, and time.   Skin: Skin is warm and dry. No rashes or lesions  Psychiatric: Normal mood and affect.Behavior is normal. Judgment and thought content normal.  he does not present as a threat to himself or others the  Assessment:       1. Annual physical exam    2. Hypothyroidism due to acquired atrophy of thyroid    3. Mixed hyperlipidemia    4. Hypotestosteronemia    5. Generalized skin lesions    6. Mixed emotional features as adjustment reaction    7. Gastroesophageal reflux disease without esophagitis        Plan:         Khadar was seen today for annual exam.    Diagnoses and all orders for this visit:    Annual physical exam    Hypothyroidism due to acquired atrophy of thyroid: doing well on current medication and dosage. Check labs.  -     TSH; Future    Mixed hyperlipidemia: follow low fat, low carb diet. Exercise. Repeat labs. Continue current medication and dosage.  -     CBC auto differential; Future  -     Comprehensive metabolic panel; Future  -     Lipid panel; Future    Hypotestosteronemia: continue medication per Urology.  -     Testosterone; Future    Generalized skin lesions: will refer to Dermatology for skin check.   -     " Ambulatory consult to Dermatology    Mixed emotional features as adjustment reaction: doing well on the Wellbutrin. Continue current medication and dosage.    Gastroesophageal reflux disease without esophagitis: well controlled with current medications. Avoid spicy, acidic foods. Do not lay down within an hour of eating.     Continue current medication  Take medications only as prescribed  Healthy diet, exercise  Adequate rest  Adequate hydration  Avoid allergens  Avoid excessive caffeine

## 2018-08-03 ENCOUNTER — LAB VISIT (OUTPATIENT)
Dept: LAB | Facility: HOSPITAL | Age: 43
End: 2018-08-03
Attending: NURSE PRACTITIONER
Payer: COMMERCIAL

## 2018-08-03 DIAGNOSIS — E78.2 MIXED HYPERLIPIDEMIA: ICD-10-CM

## 2018-08-03 DIAGNOSIS — E03.4 HYPOTHYROIDISM DUE TO ACQUIRED ATROPHY OF THYROID: ICD-10-CM

## 2018-08-03 DIAGNOSIS — E34.9 HYPOTESTOSTERONEMIA: ICD-10-CM

## 2018-08-03 LAB
ALBUMIN SERPL BCP-MCNC: 4.4 G/DL
ALP SERPL-CCNC: 53 U/L
ALT SERPL W/O P-5'-P-CCNC: 50 U/L
ANION GAP SERPL CALC-SCNC: 8 MMOL/L
AST SERPL-CCNC: 24 U/L
BASOPHILS # BLD AUTO: 0.07 K/UL
BASOPHILS NFR BLD: 1 %
BILIRUB SERPL-MCNC: 0.7 MG/DL
BUN SERPL-MCNC: 10 MG/DL
CALCIUM SERPL-MCNC: 10 MG/DL
CHLORIDE SERPL-SCNC: 103 MMOL/L
CHOLEST SERPL-MCNC: 135 MG/DL
CHOLEST/HDLC SERPL: 5 {RATIO}
CO2 SERPL-SCNC: 31 MMOL/L
CREAT SERPL-MCNC: 1.2 MG/DL
DIFFERENTIAL METHOD: ABNORMAL
EOSINOPHIL # BLD AUTO: 0.1 K/UL
EOSINOPHIL NFR BLD: 1.5 %
ERYTHROCYTE [DISTWIDTH] IN BLOOD BY AUTOMATED COUNT: 13.6 %
EST. GFR  (AFRICAN AMERICAN): >60 ML/MIN/1.73 M^2
EST. GFR  (NON AFRICAN AMERICAN): >60 ML/MIN/1.73 M^2
GLUCOSE SERPL-MCNC: 105 MG/DL
HCT VFR BLD AUTO: 49.4 %
HDLC SERPL-MCNC: 27 MG/DL
HDLC SERPL: 20 %
HGB BLD-MCNC: 15.8 G/DL
IMM GRANULOCYTES # BLD AUTO: 0.06 K/UL
IMM GRANULOCYTES NFR BLD AUTO: 0.9 %
LDLC SERPL CALC-MCNC: 67.2 MG/DL
LYMPHOCYTES # BLD AUTO: 2.3 K/UL
LYMPHOCYTES NFR BLD: 34.1 %
MCH RBC QN AUTO: 28.2 PG
MCHC RBC AUTO-ENTMCNC: 32 G/DL
MCV RBC AUTO: 88 FL
MONOCYTES # BLD AUTO: 0.6 K/UL
MONOCYTES NFR BLD: 8.7 %
NEUTROPHILS # BLD AUTO: 3.7 K/UL
NEUTROPHILS NFR BLD: 53.8 %
NONHDLC SERPL-MCNC: 108 MG/DL
NRBC BLD-RTO: 0 /100 WBC
PLATELET # BLD AUTO: 224 K/UL
PMV BLD AUTO: 9.9 FL
POTASSIUM SERPL-SCNC: 4.2 MMOL/L
PROT SERPL-MCNC: 7.2 G/DL
RBC # BLD AUTO: 5.61 M/UL
SODIUM SERPL-SCNC: 142 MMOL/L
TESTOST SERPL-MCNC: 353 NG/DL
TRIGL SERPL-MCNC: 204 MG/DL
TSH SERPL DL<=0.005 MIU/L-ACNC: 2.54 UIU/ML
WBC # BLD AUTO: 6.81 K/UL

## 2018-08-03 PROCEDURE — 36415 COLL VENOUS BLD VENIPUNCTURE: CPT | Mod: PO

## 2018-08-03 PROCEDURE — 80061 LIPID PANEL: CPT

## 2018-08-03 PROCEDURE — 80053 COMPREHEN METABOLIC PANEL: CPT

## 2018-08-03 PROCEDURE — 84403 ASSAY OF TOTAL TESTOSTERONE: CPT

## 2018-08-03 PROCEDURE — 84443 ASSAY THYROID STIM HORMONE: CPT

## 2018-08-03 PROCEDURE — 85025 COMPLETE CBC W/AUTO DIFF WBC: CPT

## 2018-09-04 RX ORDER — FENOFIBRATE 160 MG/1
160 TABLET ORAL DAILY
Qty: 90 TABLET | Refills: 1 | Status: SHIPPED | OUTPATIENT
Start: 2018-09-04 | End: 2019-05-09 | Stop reason: SDUPTHER

## 2018-10-08 ENCOUNTER — PATIENT MESSAGE (OUTPATIENT)
Dept: FAMILY MEDICINE | Facility: CLINIC | Age: 43
End: 2018-10-08

## 2018-10-09 RX ORDER — TADALAFIL 20 MG/1
20 TABLET ORAL DAILY PRN
Qty: 10 TABLET | Refills: 5 | Status: SHIPPED | OUTPATIENT
Start: 2018-10-09 | End: 2018-10-18 | Stop reason: ALTCHOICE

## 2018-10-18 ENCOUNTER — TELEPHONE (OUTPATIENT)
Dept: FAMILY MEDICINE | Facility: CLINIC | Age: 43
End: 2018-10-18

## 2018-10-18 RX ORDER — SILDENAFIL 100 MG/1
100 TABLET, FILM COATED ORAL DAILY PRN
COMMUNITY
End: 2018-10-18 | Stop reason: SDUPTHER

## 2018-10-18 RX ORDER — SILDENAFIL 100 MG/1
100 TABLET, FILM COATED ORAL DAILY PRN
Qty: 6 TABLET | Refills: 6 | Status: SHIPPED | OUTPATIENT
Start: 2018-10-18 | End: 2019-05-02 | Stop reason: SDUPTHER

## 2018-10-18 NOTE — TELEPHONE ENCOUNTER
Received a PA request for Cialis 20 mg  Cover My Meds: Key [id number]    Went to start a PA, I was unable to retrieve the PA form.    I called the [facility], the pharmacist.  She stated that she has been talking with Mr. Whittington and they can get Sildenafil for a lesser price.     They are requesting Sildenafil 100 mg.    Please advise    I have pended a order if ok.    If ok, please send to [facility].

## 2018-10-19 DIAGNOSIS — E03.4 HYPOTHYROIDISM DUE TO ACQUIRED ATROPHY OF THYROID: Primary | ICD-10-CM

## 2018-10-19 NOTE — TELEPHONE ENCOUNTER
Received fax from pharmacy asking to switch tadalafil/cialis to sildenafil 100mg tablets.  Tadalafil/cialis is not covered by pts ins and it is very costly they said.  Please advise.  Thanks.

## 2018-10-21 RX ORDER — LEVOTHYROXINE SODIUM 75 UG/1
75 TABLET ORAL DAILY
Qty: 30 TABLET | Refills: 11 | Status: SHIPPED | OUTPATIENT
Start: 2018-10-21 | End: 2019-12-03 | Stop reason: SDUPTHER

## 2018-11-05 RX ORDER — TESTOSTERONE CYPIONATE 200 MG/ML
INJECTION, SOLUTION INTRAMUSCULAR
Qty: 10 ML | Refills: 0 | Status: SHIPPED | OUTPATIENT
Start: 2018-11-05 | End: 2019-02-06 | Stop reason: SDUPTHER

## 2018-11-27 ENCOUNTER — INITIAL CONSULT (OUTPATIENT)
Dept: DERMATOLOGY | Facility: CLINIC | Age: 43
End: 2018-11-27
Payer: COMMERCIAL

## 2018-11-27 DIAGNOSIS — Z86.018 HISTORY OF DYSPLASTIC NEVUS: ICD-10-CM

## 2018-11-27 DIAGNOSIS — D22.9 MULTIPLE BENIGN NEVI: Primary | ICD-10-CM

## 2018-11-27 DIAGNOSIS — D22.9 MULTIPLE ATYPICAL NEVI: ICD-10-CM

## 2018-11-27 PROCEDURE — 99203 OFFICE O/P NEW LOW 30 MIN: CPT | Mod: S$GLB,,, | Performed by: DERMATOLOGY

## 2018-11-27 PROCEDURE — 99999 PR PBB SHADOW E&M-EST. PATIENT-LVL III: CPT | Mod: PBBFAC,,, | Performed by: DERMATOLOGY

## 2018-11-27 NOTE — PROGRESS NOTES
Subjective:       Patient ID:  Khadar Lorenzo is a 42 y.o. male who presents for   Chief Complaint   Patient presents with    Skin Check     TBSE    Spot     back     Initial visit  Intense sun exposure in childhood  H/o lesion excised on back ([facility]), unsure of path, thinks may have been skin cancer, 10+ years ago  Requests TBSE for cancer screening  Complaint below      Spot  - Initial  Affected locations: back  Duration: years.  Signs and Symptoms: raised.  Severity: mild  Timing: constant  Aggravated by: nothing  Relieving factors/Treatments tried: nothing        Review of Systems   Constitutional: Negative for fever, chills and fatigue.   Skin: Positive for activity-related sunscreen use and wears hat. Negative for daily sunscreen use.   Hematologic/Lymphatic: Does not bruise/bleed easily.        Objective:    Physical Exam   Constitutional: He appears well-developed and well-nourished. No distress.   Neurological: He is alert and oriented to person, place, and time. He is not disoriented.   Psychiatric: He has a normal mood and affect.   Skin:   Areas Examined (abnormalities noted in diagram):   Scalp / Hair Palpated and Inspected  Head / Face Inspection Performed  Neck Inspection Performed  Chest / Axilla Inspection Performed  Abdomen Inspection Performed  Genitals / Buttocks / Groin Inspection Performed  Back Inspection Performed  RUE Inspected  LUE Inspection Performed  RLE Inspected  LLE Inspection Performed  Nails and Digits Inspection Performed                  Diagram Legend     Erythematous scaling macule/papule c/w actinic keratosis       Vascular papule c/w angioma      Pigmented verrucoid papule/plaque c/w seborrheic keratosis      Yellow umbilicated papule c/w sebaceous hyperplasia      Irregularly shaped tan macule c/w lentigo     1-2 mm smooth white papules consistent with Milia      Movable subcutaneous cyst with punctum c/w epidermal inclusion cyst      Subcutaneous movable  cyst c/w pilar cyst      Firm pink to brown papule c/w dermatofibroma      Pedunculated fleshy papule(s) c/w skin tag(s)      Evenly pigmented macule c/w junctional nevus     Mildly variegated pigmented, slightly irregular-bordered macule c/w mildly atypical nevus      Flesh colored to evenly pigmented papule c/w intradermal nevus       Pink pearly papule/plaque c/w basal cell carcinoma      Erythematous hyperkeratotic cursted plaque c/w SCC      Surgical scar with no sign of skin cancer recurrence      Open and closed comedones      Inflammatory papules and pustules      Verrucoid papule consistent consistent with wart     Erythematous eczematous patches and plaques     Dystrophic onycholytic nail with subungual debris c/w onychomycosis     Umbilicated papule    Erythematous-base heme-crusted tan verrucoid plaque consistent with inflamed seborrheic keratosis     Erythematous Silvery Scaling Plaque c/w Psoriasis     See annotation    Lesion 1 (left upper back)    Lesion 2 midline lower back              Assessment / Plan:        Multiple benign nevi  Discussed ABCDE's of nevi.  Monitor for new mole or moles that are becoming bigger, darker, irritated, or developing irregular borders. Brochure provided.    Multiple atypical nevi  Large nevi with some clinical atypia, no melanoma specific features on dermoscopy, monitor for change    Advise patient to ask wife to take picture of back with cell phone, home checks for change  Reevaluate in 6 months or sooner if concern arises    History of dysplastic nevus  Area of previous DN (mid back) examined. Site well healed with no signs of recurrence.  Total body skin examination performed today including at least 12 points as noted in physical examination.     Patient instructed in importance in daily sun protection of at least spf 30. Mineral sunscreen ingredients preferred (Zinc +/- Titanium).   Recommend Elta MD for daily use on face and neck.  Patient encouraged to wear hat  for all outdoor exposure.   Also discussed sun avoidance and use of protective clothing.                 Follow-up in about 6 months (around 5/27/2019).

## 2018-11-27 NOTE — LETTER
November 27, 2018      Talita Strickland, PEDRITO  [address]  [place] [facility] [address]           [facility]  [address]  [place] [address]  Phone: [phone]          Patient: Khadar Lorenzo   MR Number: [id number]   YOB: 1975   Date of Visit: 11/27/2018       Dear Talita Strickland:    Thank you for referring Khadar Lorenzo to me for evaluation. Attached you will find relevant portions of my assessment and plan of care.    If you have questions, please do not hesitate to call me. I look forward to following Khadar Lorenzo along with you.    Sincerely,    Yenni Betancourt MD    Enclosure  CC:  No Recipients    If you would like to receive this communication electronically, please contact [email or web address] or [phone] to request more information on [employer] access.    For providers and/or their staff who would like to refer a patient to [facility], please contact us through our one-stop-shop provider referral line, [facility], at [phone].    If you feel you have received this communication in error or would no longer like to receive these types of communications, please e-mail [email or web address]

## 2018-12-07 ENCOUNTER — TELEPHONE (OUTPATIENT)
Dept: FAMILY MEDICINE | Facility: CLINIC | Age: 43
End: 2018-12-07

## 2018-12-07 NOTE — TELEPHONE ENCOUNTER
Received fax from pharmacy requesting a refill on omeprazole 40mg with a note written by pharmacist asking if pt would benefit from nexium.  Called pt and left him a message asking him call us back at [phone] to clarify which medication he would like.

## 2018-12-18 RX ORDER — BUPROPION HYDROCHLORIDE 300 MG/1
TABLET ORAL
Qty: 90 TABLET | Refills: 0 | Status: SHIPPED | OUTPATIENT
Start: 2018-12-18 | End: 2019-03-30 | Stop reason: SDUPTHER

## 2019-01-30 RX ORDER — OMEPRAZOLE 40 MG/1
CAPSULE, DELAYED RELEASE ORAL
Qty: 90 CAPSULE | Refills: 0 | Status: SHIPPED | OUTPATIENT
Start: 2019-01-30 | End: 2019-05-02 | Stop reason: SDUPTHER

## 2019-02-06 DIAGNOSIS — Z12.5 PROSTATE CANCER SCREENING: Primary | ICD-10-CM

## 2019-02-06 RX ORDER — TESTOSTERONE CYPIONATE 200 MG/ML
INJECTION, SOLUTION INTRAMUSCULAR
Qty: 10 ML | Refills: 0 | Status: SHIPPED | OUTPATIENT
Start: 2019-02-06 | End: 2019-06-04 | Stop reason: SDUPTHER

## 2019-02-06 NOTE — TELEPHONE ENCOUNTER
I will refill the testosterone but he is past due for his PSA level since taking testosterone can increase prostate issues. Orders placed, please ask him to schedule.

## 2019-02-11 NOTE — TELEPHONE ENCOUNTER
Pt is active on [employer]. Provider's message sent via patient portal with a flag to notify me if not read in one week.

## 2019-02-28 ENCOUNTER — PATIENT MESSAGE (OUTPATIENT)
Dept: FAMILY MEDICINE | Facility: CLINIC | Age: 44
End: 2019-02-28

## 2019-03-11 ENCOUNTER — LAB VISIT (OUTPATIENT)
Dept: LAB | Facility: HOSPITAL | Age: 44
End: 2019-03-11
Attending: NURSE PRACTITIONER
Payer: COMMERCIAL

## 2019-03-11 DIAGNOSIS — Z12.5 PROSTATE CANCER SCREENING: ICD-10-CM

## 2019-03-11 LAB — COMPLEXED PSA SERPL-MCNC: 1.7 NG/ML

## 2019-03-11 PROCEDURE — 36415 COLL VENOUS BLD VENIPUNCTURE: CPT | Mod: PO

## 2019-03-11 PROCEDURE — 84153 ASSAY OF PSA TOTAL: CPT

## 2019-03-12 ENCOUNTER — PATIENT MESSAGE (OUTPATIENT)
Dept: FAMILY MEDICINE | Facility: CLINIC | Age: 44
End: 2019-03-12

## 2019-04-01 RX ORDER — BUPROPION HYDROCHLORIDE 300 MG/1
300 TABLET ORAL DAILY
Qty: 30 TABLET | Refills: 0 | Status: SHIPPED | OUTPATIENT
Start: 2019-04-01 | End: 2019-07-01 | Stop reason: SDUPTHER

## 2019-04-01 RX ORDER — BUPROPION HYDROCHLORIDE 300 MG/1
TABLET ORAL
Qty: 90 TABLET | Refills: 30 | Status: SHIPPED | OUTPATIENT
Start: 2019-04-01 | End: 2019-04-01 | Stop reason: SDUPTHER

## 2019-04-01 NOTE — TELEPHONE ENCOUNTER
He is past due for his 6 month follow up. One refill given to hold him until he can come see me. Sent 30 day supply to [facility]

## 2019-04-01 NOTE — TELEPHONE ENCOUNTER
Patient was informed that requested prescription was filled, also, 6 month follow up appointment was scheduled.

## 2019-04-09 ENCOUNTER — OFFICE VISIT (OUTPATIENT)
Dept: FAMILY MEDICINE | Facility: CLINIC | Age: 44
End: 2019-04-09
Payer: COMMERCIAL

## 2019-04-09 VITALS
BODY MASS INDEX: 30.1 KG/M2 | HEIGHT: 71 IN | SYSTOLIC BLOOD PRESSURE: 120 MMHG | WEIGHT: 215 LBS | HEART RATE: 74 BPM | DIASTOLIC BLOOD PRESSURE: 60 MMHG | RESPIRATION RATE: 18 BRPM | TEMPERATURE: 98 F | OXYGEN SATURATION: 95 %

## 2019-04-09 DIAGNOSIS — E29.1 MALE HYPOGONADISM: ICD-10-CM

## 2019-04-09 DIAGNOSIS — N52.9 ERECTILE DYSFUNCTION, UNSPECIFIED ERECTILE DYSFUNCTION TYPE: ICD-10-CM

## 2019-04-09 DIAGNOSIS — E78.1 HYPERTRIGLYCERIDEMIA: Primary | ICD-10-CM

## 2019-04-09 DIAGNOSIS — E03.4 HYPOTHYROIDISM DUE TO ACQUIRED ATROPHY OF THYROID: ICD-10-CM

## 2019-04-09 DIAGNOSIS — K21.9 GASTROESOPHAGEAL REFLUX DISEASE WITHOUT ESOPHAGITIS: ICD-10-CM

## 2019-04-09 DIAGNOSIS — E34.9 HYPOTESTOSTERONEMIA: ICD-10-CM

## 2019-04-09 DIAGNOSIS — F43.29 MIXED EMOTIONAL FEATURES AS ADJUSTMENT REACTION: ICD-10-CM

## 2019-04-09 LAB
ALBUMIN SERPL BCP-MCNC: 4.2 G/DL (ref 3.5–5.2)
ALP SERPL-CCNC: 54 U/L (ref 55–135)
ALT SERPL W/O P-5'-P-CCNC: 32 U/L (ref 10–44)
ANION GAP SERPL CALC-SCNC: 9 MMOL/L (ref 8–16)
AST SERPL-CCNC: 21 U/L (ref 10–40)
BASOPHILS # BLD AUTO: 0.07 K/UL (ref 0–0.2)
BASOPHILS NFR BLD: 1.1 % (ref 0–1.9)
BILIRUB SERPL-MCNC: 0.5 MG/DL (ref 0.1–1)
BUN SERPL-MCNC: 9 MG/DL (ref 6–20)
CALCIUM SERPL-MCNC: 10.1 MG/DL (ref 8.7–10.5)
CHLORIDE SERPL-SCNC: 104 MMOL/L (ref 95–110)
CHOLEST SERPL-MCNC: 153 MG/DL (ref 120–199)
CHOLEST/HDLC SERPL: 5.5 {RATIO} (ref 2–5)
CO2 SERPL-SCNC: 27 MMOL/L (ref 23–29)
CREAT SERPL-MCNC: 1.2 MG/DL (ref 0.5–1.4)
DIFFERENTIAL METHOD: ABNORMAL
EOSINOPHIL # BLD AUTO: 0.1 K/UL (ref 0–0.5)
EOSINOPHIL NFR BLD: 1.7 % (ref 0–8)
ERYTHROCYTE [DISTWIDTH] IN BLOOD BY AUTOMATED COUNT: 12.9 % (ref 11.5–14.5)
EST. GFR  (AFRICAN AMERICAN): >60 ML/MIN/1.73 M^2
EST. GFR  (NON AFRICAN AMERICAN): >60 ML/MIN/1.73 M^2
GLUCOSE SERPL-MCNC: 108 MG/DL (ref 70–110)
HCT VFR BLD AUTO: 51.4 % (ref 40–54)
HDLC SERPL-MCNC: 28 MG/DL (ref 40–75)
HDLC SERPL: 18.3 % (ref 20–50)
HGB BLD-MCNC: 16.2 G/DL (ref 14–18)
IMM GRANULOCYTES # BLD AUTO: 0.03 K/UL (ref 0–0.04)
IMM GRANULOCYTES NFR BLD AUTO: 0.5 % (ref 0–0.5)
LDLC SERPL CALC-MCNC: 82.4 MG/DL (ref 63–159)
LYMPHOCYTES # BLD AUTO: 2.3 K/UL (ref 1–4.8)
LYMPHOCYTES NFR BLD: 34.3 % (ref 18–48)
MCH RBC QN AUTO: 27.4 PG (ref 27–31)
MCHC RBC AUTO-ENTMCNC: 31.5 G/DL (ref 32–36)
MCV RBC AUTO: 87 FL (ref 82–98)
MONOCYTES # BLD AUTO: 0.5 K/UL (ref 0.3–1)
MONOCYTES NFR BLD: 7.6 % (ref 4–15)
NEUTROPHILS # BLD AUTO: 3.6 K/UL (ref 1.8–7.7)
NEUTROPHILS NFR BLD: 54.8 % (ref 38–73)
NONHDLC SERPL-MCNC: 125 MG/DL
NRBC BLD-RTO: 0 /100 WBC
PLATELET # BLD AUTO: 262 K/UL (ref 150–350)
PMV BLD AUTO: 10.1 FL (ref 9.2–12.9)
POTASSIUM SERPL-SCNC: 4.2 MMOL/L (ref 3.5–5.1)
PROT SERPL-MCNC: 7.2 G/DL (ref 6–8.4)
RBC # BLD AUTO: 5.92 M/UL (ref 4.6–6.2)
SODIUM SERPL-SCNC: 140 MMOL/L (ref 136–145)
TESTOST SERPL-MCNC: 271 NG/DL (ref 304–1227)
TRIGL SERPL-MCNC: 213 MG/DL (ref 30–150)
TSH SERPL DL<=0.005 MIU/L-ACNC: 1.51 UIU/ML (ref 0.4–4)
WBC # BLD AUTO: 6.58 K/UL (ref 3.9–12.7)

## 2019-04-09 PROCEDURE — 84443 ASSAY THYROID STIM HORMONE: CPT

## 2019-04-09 PROCEDURE — 85025 COMPLETE CBC W/AUTO DIFF WBC: CPT

## 2019-04-09 PROCEDURE — 80053 COMPREHEN METABOLIC PANEL: CPT

## 2019-04-09 PROCEDURE — 36415 PR COLLECTION VENOUS BLOOD,VENIPUNCTURE: ICD-10-PCS | Mod: S$GLB,,, | Performed by: NURSE PRACTITIONER

## 2019-04-09 PROCEDURE — 3008F BODY MASS INDEX DOCD: CPT | Mod: CPTII,S$GLB,, | Performed by: NURSE PRACTITIONER

## 2019-04-09 PROCEDURE — 80061 LIPID PANEL: CPT

## 2019-04-09 PROCEDURE — 84403 ASSAY OF TOTAL TESTOSTERONE: CPT

## 2019-04-09 PROCEDURE — 3008F PR BODY MASS INDEX (BMI) DOCUMENTED: ICD-10-PCS | Mod: CPTII,S$GLB,, | Performed by: NURSE PRACTITIONER

## 2019-04-09 PROCEDURE — 99214 OFFICE O/P EST MOD 30 MIN: CPT | Mod: S$GLB,,, | Performed by: NURSE PRACTITIONER

## 2019-04-09 PROCEDURE — 36415 COLL VENOUS BLD VENIPUNCTURE: CPT | Mod: S$GLB,,, | Performed by: NURSE PRACTITIONER

## 2019-04-09 PROCEDURE — 99214 PR OFFICE/OUTPT VISIT, EST, LEVL IV, 30-39 MIN: ICD-10-PCS | Mod: S$GLB,,, | Performed by: NURSE PRACTITIONER

## 2019-04-09 NOTE — PROGRESS NOTES
Subjective:       Patient ID: Khadar Lorenzo is a 43 y.o. male.    Chief Complaint: Follow-up (6 month follow up with labs prior)    HPI here for 6 month follow up on several issues. States he is doing well. Taking his medication as prescribed.   1. Hypertriglyceridemia: will draw his labs today. He is taking fenofibrate 160mg daily. Last labs done 8 months ago showed triglycerides at 204. He is trying to follow low fat, low carb diet and exercise.   2. Hypotestosterone: He is taking the testosterone replacement as prescribed by Urology. I do give him the refills in between his follow ups with them. He feels his current dose works well. Will check PSA level and testosterone levels.   3. Hypothyroid: due to recheck on TSH level. Last check he was stable. He denies any concerning symptoms related to his thyroid.  4. Male Hypogonadism: testosterone replacement.  5. GERD: doing well on the prilosec and diet.  6. Anxiety/depression: doing well on the wellbutrin. Takes as prescribed. Denies any thoughts of wanting to harm himself or others.  7. ED: uses viagra on PRN basis with good results.   8: Dermatitis: followed by Dermatology. Uses medications prescribed by her with good results.   No specific concerns today. See ROS    The following portion of the patients history was reviewed and updated as appropriate: allergies, current medications, past medical and surgical history. Past social history and problem list reviewed. Family PMH and Past social history reviewed. Tobacco, Illicit drug use reviewed.     Review of Systems   Constitutional: Negative for activity change, appetite change, chills, fatigue, fever and unexpected weight change.   HENT: Negative for congestion, hearing loss, mouth sores, postnasal drip, rhinorrhea, sneezing, trouble swallowing and voice change.    Eyes: Negative for redness and visual disturbance.   Respiratory: Negative for cough, choking, chest tightness, shortness of breath and  "wheezing.    Cardiovascular: Negative for chest pain, palpitations and leg swelling.   Gastrointestinal: Negative for abdominal distention, abdominal pain, blood in stool, constipation, diarrhea, nausea and vomiting.   Endocrine: Negative for cold intolerance, heat intolerance, polydipsia, polyphagia and polyuria.   Genitourinary: Negative for decreased urine volume, difficulty urinating, flank pain, frequency and urgency.   Musculoskeletal: Negative for arthralgias, back pain and gait problem.   Skin: Negative for pallor, rash and wound.   Allergic/Immunologic: Negative for environmental allergies and food allergies.   Neurological: Negative for dizziness, tremors, seizures, speech difficulty, weakness and headaches.   Hematological: Negative for adenopathy. Does not bruise/bleed easily.   Psychiatric/Behavioral: Negative for agitation, behavioral problems, confusion, decreased concentration, dysphoric mood, self-injury, sleep disturbance and suicidal ideas. The patient is not nervous/anxious.        Objective:     /60   Pulse 74   Temp 98.2 °F (36.8 °C) (Oral)   Resp 18   Ht 5' 11" (1.803 m)   Wt 97.5 kg (215 lb)   SpO2 95%   BMI 29.99 kg/m²      Physical Exam   Constitutional: He is oriented to person, place, and time. He appears well-developed and well-nourished. He is cooperative. No distress.   HENT:   Head: Normocephalic and atraumatic.   Right Ear: Tympanic membrane, external ear and ear canal normal.   Left Ear: Tympanic membrane, external ear and ear canal normal.   Nose: No mucosal edema, rhinorrhea or sinus tenderness.   Mouth/Throat: Uvula is midline, oropharynx is clear and moist and mucous membranes are normal. No oropharyngeal exudate, posterior oropharyngeal edema or posterior oropharyngeal erythema.   Eyes: Pupils are equal, round, and reactive to light. Conjunctivae, EOM and lids are normal. Right eye exhibits no discharge and no exudate. Left eye exhibits no discharge and no exudate. "   Neck: Trachea normal, normal range of motion and full passive range of motion without pain. Neck supple. No JVD present. No tracheal tenderness present. Carotid bruit is not present. No tracheal deviation present. No thyroid mass and no thyromegaly present.   Cardiovascular: Normal rate, regular rhythm, S1 normal, S2 normal, normal heart sounds and normal pulses.   No murmur heard.  Pulmonary/Chest: Effort normal and breath sounds normal. He has no wheezes. He has no rhonchi. He has no rales. He exhibits no tenderness.   Abdominal: Soft. Normal appearance and bowel sounds are normal. He exhibits no distension and no abdominal bruit. There is no hepatosplenomegaly. There is no tenderness. No hernia.   Musculoskeletal: Normal range of motion.   Lymphadenopathy:     He has no cervical adenopathy.        Right cervical: No superficial cervical adenopathy present.       Left cervical: No superficial cervical adenopathy present.     He has no axillary adenopathy.        Right: No supraclavicular adenopathy present.        Left: No supraclavicular adenopathy present.   Neurological: He is alert and oriented to person, place, and time. He has normal strength. He displays no tremor.   Skin: Skin is warm and dry. Capillary refill takes less than 2 seconds. No rash noted.   Psychiatric: He has a normal mood and affect. His speech is normal and behavior is normal. Judgment and thought content normal. His mood appears not anxious. Cognition and memory are normal. He does not exhibit a depressed mood.       Assessment:       1. Hypertriglyceridemia    2. Hypotestosteronemia    3. Hypothyroidism due to acquired atrophy of thyroid    4. Male hypogonadism    5. Gastroesophageal reflux disease without esophagitis    6. Mixed emotional features as adjustment reaction    7. Erectile dysfunction, unspecified erectile dysfunction type        Plan:         Khadar was seen today for follow-up.    Diagnoses and all orders for this  visit:    Hypertriglyceridemia: continue current dose of tricor. Will check labs. Follow low fat, low carb diet. Exercise.   -     CBC auto differential  -     Comprehensive metabolic panel  -     Lipid panel    Hypotestosteronemia: stable. Due for labs. Continue with current dose of testosterone. Follow with Urology.   -     Testosterone; Future  -     Testosterone    Hypothyroidism due to acquired atrophy of thyroid: good control. Due for repeat labs. Continue with current dose of synthroid at 75mcg. Take medication alone.   -     TSH    Male hypogonadism: continue to follow with urology. Testosterone as prescribed.    Gastroesophageal reflux disease without esophagitis: well controlled on prilosec. Take as directed. Avoid acid producing foods. Do not lay down within two hours of heating.     Mixed emotional features as adjustment reaction: doing well on the Wellbutrin XL 300mg daily.     Erectile dysfunction, unspecified erectile dysfunction type: good results with PRN use of the viagra.    Dermatitis: continue to follow with Dermatology. Continue medications given by her. Good control.     Continue current medication  Take medications only as prescribed  Healthy diet, exercise  Adequate rest  Adequate hydration  Avoid allergens  Avoid excessive caffeine    Current Outpatient Medications   Medication Sig Dispense Refill    buPROPion (WELLBUTRIN XL) 300 MG 24 hr tablet Take 1 tablet (300 mg total) by mouth once daily. 30 tablet 0    cyclobenzaprine (FLEXERIL) 10 MG tablet Take 10 mg by mouth 3 (three) times daily as needed for Muscle spasms.      fenofibrate 160 MG Tab Take 1 tablet (160 mg total) by mouth once daily. 90 tablet 1    ketoconazole (NIZORAL) 2 % shampoo APPLY ONE APPLICATION TOPICALLY TWICE A WEEK 120 mL 5    levothyroxine (SYNTHROID) 75 MCG tablet Take 1 tablet (75 mcg total) by mouth once daily. 30 tablet 11    OLUX-E 0.05 % topical foam Apply 0.05 % topically continuous prn.      omeprazole  (PRILOSEC) 40 MG capsule TAKE ONE CAPSULE BY MOUTH EVERY DAY 90 capsule 0    sildenafil (VIAGRA) 100 MG tablet Take 1 tablet (100 mg total) by mouth daily as needed for Erectile Dysfunction. 6 tablet 6    testosterone cypionate (DEPOTESTOTERONE CYPIONATE) 200 mg/mL injection INJECT 150 MG (0.75 ML) INTRAMUSCULARLY EVERY 7 DAYS 10 mL 0     Current Facility-Administered Medications   Medication Dose Route Frequency Provider Last Rate Last Dose    triamcinolone acetonide injection 80 mg  80 mg Intra-articular 1 time in Clinic/HOD Arben Rao MD

## 2019-04-10 ENCOUNTER — PATIENT MESSAGE (OUTPATIENT)
Dept: FAMILY MEDICINE | Facility: CLINIC | Age: 44
End: 2019-04-10

## 2019-05-02 DIAGNOSIS — E78.1 HYPERTRIGLYCERIDEMIA: Primary | ICD-10-CM

## 2019-05-02 RX ORDER — OMEPRAZOLE 40 MG/1
CAPSULE, DELAYED RELEASE ORAL
Qty: 90 CAPSULE | Refills: 1 | Status: SHIPPED | OUTPATIENT
Start: 2019-05-02 | End: 2019-12-03 | Stop reason: SDUPTHER

## 2019-05-02 RX ORDER — SILDENAFIL 100 MG/1
TABLET, FILM COATED ORAL
Qty: 6 TABLET | Refills: 6 | Status: SHIPPED | OUTPATIENT
Start: 2019-05-02 | End: 2020-06-01

## 2019-05-06 ENCOUNTER — OFFICE VISIT (OUTPATIENT)
Dept: URGENT CARE | Facility: CLINIC | Age: 44
End: 2019-05-06
Payer: COMMERCIAL

## 2019-05-06 VITALS
OXYGEN SATURATION: 97 % | HEART RATE: 78 BPM | SYSTOLIC BLOOD PRESSURE: 126 MMHG | RESPIRATION RATE: 16 BRPM | TEMPERATURE: 98 F | WEIGHT: 215 LBS | HEIGHT: 71 IN | DIASTOLIC BLOOD PRESSURE: 83 MMHG | BODY MASS INDEX: 30.1 KG/M2

## 2019-05-06 DIAGNOSIS — L08.9 LOCALIZED INFECTION OF SKIN: Primary | ICD-10-CM

## 2019-05-06 PROCEDURE — 3008F BODY MASS INDEX DOCD: CPT | Mod: CPTII,S$GLB,, | Performed by: FAMILY MEDICINE

## 2019-05-06 PROCEDURE — 99214 OFFICE O/P EST MOD 30 MIN: CPT | Mod: S$GLB,,, | Performed by: FAMILY MEDICINE

## 2019-05-06 PROCEDURE — 99214 PR OFFICE/OUTPT VISIT, EST, LEVL IV, 30-39 MIN: ICD-10-PCS | Mod: S$GLB,,, | Performed by: FAMILY MEDICINE

## 2019-05-06 PROCEDURE — 3008F PR BODY MASS INDEX (BMI) DOCUMENTED: ICD-10-PCS | Mod: CPTII,S$GLB,, | Performed by: FAMILY MEDICINE

## 2019-05-06 RX ORDER — MUPIROCIN 20 MG/G
OINTMENT TOPICAL
Qty: 22 G | Refills: 1 | Status: SHIPPED | OUTPATIENT
Start: 2019-05-06 | End: 2019-10-22

## 2019-05-06 RX ORDER — SULFAMETHOXAZOLE AND TRIMETHOPRIM 800; 160 MG/1; MG/1
1 TABLET ORAL 2 TIMES DAILY
Qty: 20 TABLET | Refills: 0 | Status: SHIPPED | OUTPATIENT
Start: 2019-05-06 | End: 2019-10-22 | Stop reason: ALTCHOICE

## 2019-05-06 NOTE — PROGRESS NOTES
"Subjective:       Patient ID: Khadar Lorenzo is a 43 y.o. male.    Vitals:  height is 5' 11" (1.803 m) and weight is 97.5 kg (215 lb). His temperature is 98.2 °F (36.8 °C). His blood pressure is 126/83 and his pulse is 78. His respiration is 16 and oxygen saturation is 97%.     Chief Complaint: Insect Bite    Pt states he was bitten on left ankle by an unknown insect x 4 days. Pt states the area is sore and seems to be getting worse.    Insect Bite   This is a new problem. The current episode started in the past 7 days. The problem occurs constantly. The problem has been gradually worsening. Pertinent negatives include no arthralgias, chest pain, chills, congestion, coughing, fatigue, fever, headaches, joint swelling, myalgias, nausea, rash, sore throat, vertigo or vomiting.       Constitution: Negative for chills, fatigue and fever.   HENT: Negative for congestion and sore throat.    Neck: Negative for painful lymph nodes.   Cardiovascular: Negative for chest pain and leg swelling.   Eyes: Negative for double vision and blurred vision.   Respiratory: Negative for cough and shortness of breath.    Gastrointestinal: Negative for nausea, vomiting and diarrhea.   Genitourinary: Negative for dysuria, frequency and urgency.   Musculoskeletal: Positive for pain. Negative for joint pain, joint swelling, muscle cramps and muscle ache.   Skin: Positive for erythema. Negative for color change, pale and rash.   Allergic/Immunologic: Negative for seasonal allergies.   Neurological: Negative for dizziness, history of vertigo, light-headedness, passing out and headaches.   Hematologic/Lymphatic: Negative for swollen lymph nodes, easy bruising/bleeding and history of blood clots. Does not bruise/bleed easily.   Psychiatric/Behavioral: Negative for nervous/anxious, sleep disturbance and depression. The patient is not nervous/anxious.        Objective:      Physical Exam   Constitutional: He is oriented to person, place, " and time. He appears well-developed and well-nourished.   HENT:   Head: Normocephalic and atraumatic. Head is without abrasion, without contusion and without laceration.   Nose: Nose normal.   Eyes: Pupils are equal, round, and reactive to light. Conjunctivae, EOM and lids are normal.   Neck: Trachea normal, full passive range of motion without pain and phonation normal. Neck supple.   Cardiovascular: Normal rate.   Pulmonary/Chest: Effort normal. No stridor. No respiratory distress.   Musculoskeletal: Normal range of motion.   Neurological: He is alert and oriented to person, place, and time.   Skin: Skin is warm, dry and intact. Capillary refill takes less than 2 seconds. Lesion noted. No abrasion, no bruising, no burn, no ecchymosis, no laceration and no rash noted. There is erythema.        Psychiatric: He has a normal mood and affect. His speech is normal and behavior is normal. Judgment and thought content normal. Cognition and memory are normal.   Nursing note and vitals reviewed.      Assessment:       1. Localized infection of skin        Plan:         Localized infection of skin    Other orders  -     mupirocin (BACTROBAN) 2 % ointment; Apply to affected area 3 times daily  Dispense: 22 g; Refill: 1  -     sulfamethoxazole-trimethoprim 800-160mg (BACTRIM DS) 800-160 mg Tab; Take 1 tablet by mouth 2 (two) times daily.  Dispense: 20 tablet; Refill: 0

## 2019-05-09 ENCOUNTER — TELEPHONE (OUTPATIENT)
Dept: URGENT CARE | Facility: CLINIC | Age: 44
End: 2019-05-09

## 2019-05-10 ENCOUNTER — OFFICE VISIT (OUTPATIENT)
Dept: DERMATOLOGY | Facility: CLINIC | Age: 44
End: 2019-05-10
Payer: COMMERCIAL

## 2019-05-10 VITALS — BODY MASS INDEX: 30.09 KG/M2 | WEIGHT: 214.94 LBS | HEIGHT: 71 IN

## 2019-05-10 DIAGNOSIS — Z86.018 HISTORY OF DYSPLASTIC NEVUS: ICD-10-CM

## 2019-05-10 DIAGNOSIS — D22.9 MULTIPLE BENIGN NEVI: ICD-10-CM

## 2019-05-10 DIAGNOSIS — D22.9 MULTIPLE ATYPICAL NEVI: Primary | ICD-10-CM

## 2019-05-10 PROCEDURE — 99999 PR PBB SHADOW E&M-EST. PATIENT-LVL III: CPT | Mod: PBBFAC,,, | Performed by: DERMATOLOGY

## 2019-05-10 PROCEDURE — 3008F PR BODY MASS INDEX (BMI) DOCUMENTED: ICD-10-PCS | Mod: CPTII,S$GLB,, | Performed by: DERMATOLOGY

## 2019-05-10 PROCEDURE — 3008F BODY MASS INDEX DOCD: CPT | Mod: CPTII,S$GLB,, | Performed by: DERMATOLOGY

## 2019-05-10 PROCEDURE — 99213 PR OFFICE/OUTPT VISIT, EST, LEVL III, 20-29 MIN: ICD-10-PCS | Mod: S$GLB,,, | Performed by: DERMATOLOGY

## 2019-05-10 PROCEDURE — 99999 PR PBB SHADOW E&M-EST. PATIENT-LVL III: ICD-10-PCS | Mod: PBBFAC,,, | Performed by: DERMATOLOGY

## 2019-05-10 PROCEDURE — 99213 OFFICE O/P EST LOW 20 MIN: CPT | Mod: S$GLB,,, | Performed by: DERMATOLOGY

## 2019-05-10 RX ORDER — FENOFIBRATE 160 MG/1
160 TABLET ORAL DAILY
Qty: 90 TABLET | Refills: 1 | Status: SHIPPED | OUTPATIENT
Start: 2019-05-10 | End: 2019-12-03 | Stop reason: SDUPTHER

## 2019-05-10 NOTE — PROGRESS NOTES
"  Subjective:       Patient ID:  Khadar Lorenzo is a 43 y.o. male who presents for   Chief Complaint   Patient presents with    Skin Check     UBSE    Spot     L ankle     Last seen 11/2018 with multiple clinically atypical nevi  Here for reevaluation    Intense sun exposure in childhood  H/o lesion excised on back ([facility]), unsure of path, thinks may have been skin cancer, 10+ years ago  Requests UBSE for cancer screening    Recent "spider bite" to ankle, on bactrim, resolving        Spot  - Initial  Affected locations: L ankle.  Duration: 2 weeks  Signs / symptoms: redness  Severity: mild  Timing: constant  Aggravated by: nothing  Treatments tried: Bactroban ointment.  Improvement on treatment: significant        Review of Systems   Constitutional: Negative for fever, chills and fatigue.   Skin: Positive for wears hat. Negative for daily sunscreen use and activity-related sunscreen use.   Hematologic/Lymphatic: Does not bruise/bleed easily.        Objective:    Physical Exam   Constitutional: He appears well-developed and well-nourished. No distress.   Neurological: He is alert and oriented to person, place, and time. He is not disoriented.   Psychiatric: He has a normal mood and affect.   Skin:   Areas Examined (abnormalities noted in diagram):   Scalp / Hair Palpated and Inspected  Head / Face Inspection Performed  Neck Inspection Performed  Chest / Axilla Inspection Performed  Abdomen Inspection Performed  Back Inspection Performed  RUE Inspected  LUE Inspection Performed  Nails and Digits Inspection Performed                  Diagram Legend     Erythematous scaling macule/papule c/w actinic keratosis       Vascular papule c/w angioma      Pigmented verrucoid papule/plaque c/w seborrheic keratosis      Yellow umbilicated papule c/w sebaceous hyperplasia      Irregularly shaped tan macule c/w lentigo     1-2 mm smooth white papules consistent with Milia      Movable subcutaneous cyst " with punctum c/w epidermal inclusion cyst      Subcutaneous movable cyst c/w pilar cyst      Firm pink to brown papule c/w dermatofibroma      Pedunculated fleshy papule(s) c/w skin tag(s)      Evenly pigmented macule c/w junctional nevus     Mildly variegated pigmented, slightly irregular-bordered macule c/w mildly atypical nevus      Flesh colored to evenly pigmented papule c/w intradermal nevus       Pink pearly papule/plaque c/w basal cell carcinoma      Erythematous hyperkeratotic cursted plaque c/w SCC      Surgical scar with no sign of skin cancer recurrence      Open and closed comedones      Inflammatory papules and pustules      Verrucoid papule consistent consistent with wart     Erythematous eczematous patches and plaques     Dystrophic onycholytic nail with subungual debris c/w onychomycosis     Umbilicated papule    Erythematous-base heme-crusted tan verrucoid plaque consistent with inflamed seborrheic keratosis     Erythematous Silvery Scaling Plaque c/w Psoriasis     See annotation   left upper back (lesion 1)- unchanged    Midline mid back-             Assessment / Plan:        Multiple atypical nevi  Multiple benign nevi    Discussed ABCDE's of nevi.  Monitor for new mole or moles that are becoming bigger, darker, irritated, or developing irregular borders. Brochure provided.    Large nevi with some clinical atypia, no melanoma specific features on dermoscopy, no change noted from prior evaluation 11/2018    Advise patient to ask wife to take picture of back with cell phone, home checks for change  Reevaluate in 6 months or sooner if concern arises    History of dysplastic nevus  Area of previous DN (mid back) examined. Site well healed with no signs of recurrence.  Total body skin examination performed today including at least 12 points as noted in physical examination.     Patient instructed in importance in daily sun protection of at least spf 30. Mineral sunscreen ingredients preferred (Zinc  +/- Titanium).   Recommend Elta MD for daily use on face and neck.  Patient encouraged to wear hat for all outdoor exposure.   Also discussed sun avoidance and use of protective clothing.    Discussed full body photography- Sampson Lehman           Follow up in about 6 months (around 11/10/2019).

## 2019-06-04 RX ORDER — TESTOSTERONE CYPIONATE 200 MG/ML
INJECTION, SOLUTION INTRAMUSCULAR
Qty: 10 ML | Refills: 0 | Status: SHIPPED | OUTPATIENT
Start: 2019-06-04 | End: 2019-10-01 | Stop reason: SDUPTHER

## 2019-07-01 RX ORDER — BUPROPION HYDROCHLORIDE 300 MG/1
TABLET ORAL
Qty: 30 TABLET | Refills: 4 | Status: SHIPPED | OUTPATIENT
Start: 2019-07-01 | End: 2020-06-30

## 2019-07-03 ENCOUNTER — PATIENT OUTREACH (OUTPATIENT)
Dept: ADMINISTRATIVE | Facility: HOSPITAL | Age: 44
End: 2019-07-03

## 2019-10-02 RX ORDER — TESTOSTERONE CYPIONATE 200 MG/ML
INJECTION, SOLUTION INTRAMUSCULAR
Qty: 10 ML | Refills: 0 | Status: SHIPPED | OUTPATIENT
Start: 2019-10-02 | End: 2020-02-19 | Stop reason: SDUPTHER

## 2019-10-03 ENCOUNTER — PATIENT OUTREACH (OUTPATIENT)
Dept: ADMINISTRATIVE | Facility: HOSPITAL | Age: 44
End: 2019-10-03

## 2019-10-22 ENCOUNTER — OFFICE VISIT (OUTPATIENT)
Dept: FAMILY MEDICINE | Facility: CLINIC | Age: 44
End: 2019-10-22
Payer: COMMERCIAL

## 2019-10-22 VITALS
BODY MASS INDEX: 30.31 KG/M2 | RESPIRATION RATE: 16 BRPM | TEMPERATURE: 98 F | WEIGHT: 216.5 LBS | HEART RATE: 78 BPM | HEIGHT: 71 IN | DIASTOLIC BLOOD PRESSURE: 86 MMHG | OXYGEN SATURATION: 97 % | SYSTOLIC BLOOD PRESSURE: 122 MMHG

## 2019-10-22 DIAGNOSIS — E34.9 HYPOTESTOSTERONEMIA: ICD-10-CM

## 2019-10-22 DIAGNOSIS — F41.9 ANXIETY: ICD-10-CM

## 2019-10-22 DIAGNOSIS — E78.5 HYPERLIPIDEMIA, UNSPECIFIED HYPERLIPIDEMIA TYPE: Primary | ICD-10-CM

## 2019-10-22 DIAGNOSIS — Z12.5 PROSTATE CANCER SCREENING: ICD-10-CM

## 2019-10-22 DIAGNOSIS — E03.4 HYPOTHYROIDISM DUE TO ACQUIRED ATROPHY OF THYROID: ICD-10-CM

## 2019-10-22 DIAGNOSIS — K21.9 GASTROESOPHAGEAL REFLUX DISEASE WITHOUT ESOPHAGITIS: ICD-10-CM

## 2019-10-22 LAB
ALBUMIN SERPL BCP-MCNC: 4.3 G/DL (ref 3.5–5.2)
ALP SERPL-CCNC: 44 U/L (ref 55–135)
ALT SERPL W/O P-5'-P-CCNC: 52 U/L (ref 10–44)
ANION GAP SERPL CALC-SCNC: 12 MMOL/L (ref 8–16)
AST SERPL-CCNC: 26 U/L (ref 10–40)
BILIRUB SERPL-MCNC: 0.8 MG/DL (ref 0.1–1)
BUN SERPL-MCNC: 9 MG/DL (ref 6–20)
CALCIUM SERPL-MCNC: 9.6 MG/DL (ref 8.7–10.5)
CHLORIDE SERPL-SCNC: 105 MMOL/L (ref 95–110)
CHOLEST SERPL-MCNC: 154 MG/DL (ref 120–199)
CHOLEST/HDLC SERPL: 4.8 {RATIO} (ref 2–5)
CO2 SERPL-SCNC: 24 MMOL/L (ref 23–29)
COMPLEXED PSA SERPL-MCNC: 1.3 NG/ML (ref 0–4)
CREAT SERPL-MCNC: 1.1 MG/DL (ref 0.5–1.4)
EST. GFR  (AFRICAN AMERICAN): >60 ML/MIN/1.73 M^2
EST. GFR  (NON AFRICAN AMERICAN): >60 ML/MIN/1.73 M^2
GLUCOSE SERPL-MCNC: 95 MG/DL (ref 70–110)
HDLC SERPL-MCNC: 32 MG/DL (ref 40–75)
HDLC SERPL: 20.8 % (ref 20–50)
LDLC SERPL CALC-MCNC: 93 MG/DL (ref 63–159)
NONHDLC SERPL-MCNC: 122 MG/DL
POTASSIUM SERPL-SCNC: 4.1 MMOL/L (ref 3.5–5.1)
PROT SERPL-MCNC: 7.1 G/DL (ref 6–8.4)
SODIUM SERPL-SCNC: 141 MMOL/L (ref 136–145)
TRIGL SERPL-MCNC: 145 MG/DL (ref 30–150)

## 2019-10-22 PROCEDURE — 84403 ASSAY OF TOTAL TESTOSTERONE: CPT

## 2019-10-22 PROCEDURE — 84153 ASSAY OF PSA TOTAL: CPT

## 2019-10-22 PROCEDURE — 3008F BODY MASS INDEX DOCD: CPT | Mod: CPTII,S$GLB,, | Performed by: NURSE PRACTITIONER

## 2019-10-22 PROCEDURE — 80061 LIPID PANEL: CPT

## 2019-10-22 PROCEDURE — 80053 COMPREHEN METABOLIC PANEL: CPT

## 2019-10-22 PROCEDURE — 3008F PR BODY MASS INDEX (BMI) DOCUMENTED: ICD-10-PCS | Mod: CPTII,S$GLB,, | Performed by: NURSE PRACTITIONER

## 2019-10-22 PROCEDURE — 99214 OFFICE O/P EST MOD 30 MIN: CPT | Mod: 25,S$GLB,, | Performed by: NURSE PRACTITIONER

## 2019-10-22 PROCEDURE — 99214 PR OFFICE/OUTPT VISIT, EST, LEVL IV, 30-39 MIN: ICD-10-PCS | Mod: 25,S$GLB,, | Performed by: NURSE PRACTITIONER

## 2019-10-22 PROCEDURE — 84443 ASSAY THYROID STIM HORMONE: CPT

## 2019-10-22 PROCEDURE — 90471 FLU VACCINE (QUAD) GREATER THAN OR EQUAL TO 3YO PRESERVATIVE FREE IM: ICD-10-PCS | Mod: S$GLB,,, | Performed by: NURSE PRACTITIONER

## 2019-10-22 PROCEDURE — 90471 IMMUNIZATION ADMIN: CPT | Mod: S$GLB,,, | Performed by: NURSE PRACTITIONER

## 2019-10-22 PROCEDURE — 90686 FLU VACCINE (QUAD) GREATER THAN OR EQUAL TO 3YO PRESERVATIVE FREE IM: ICD-10-PCS | Mod: S$GLB,,, | Performed by: NURSE PRACTITIONER

## 2019-10-22 PROCEDURE — 90686 IIV4 VACC NO PRSV 0.5 ML IM: CPT | Mod: S$GLB,,, | Performed by: NURSE PRACTITIONER

## 2019-10-22 NOTE — PROGRESS NOTES
The Subjective:       Patient ID: Khadar Lorenzo is a 43 y.o. male.    Chief Complaint: Follow-up    HPI here for 6 month follow-up.    1.  Hyperlipidemia:  Taking fenofibrate 160 mg daily.  Recently had labs done which show good control of cholesterol.    Lab Results   Component Value Date    CHOL 154 10/22/2019    CHOL 153 04/09/2019    CHOL 135 08/03/2018     Lab Results   Component Value Date    HDL 32 (L) 10/22/2019    HDL 28 (L) 04/09/2019    HDL 27 (L) 08/03/2018     Lab Results   Component Value Date    LDLCALC 93.0 10/22/2019    LDLCALC 82.4 04/09/2019    LDLCALC 67.2 08/03/2018     Lab Results   Component Value Date    TRIG 145 10/22/2019    TRIG 213 (H) 04/09/2019    TRIG 204 (H) 08/03/2018     Lab Results   Component Value Date    CHOLHDL 20.8 10/22/2019    CHOLHDL 18.3 (L) 04/09/2019    CHOLHDL 20.0 08/03/2018       2.  Hypotestosterone:  Doing good on testosterone replacement.  PSA level in normal range.  Testosterone in good range.  Continue to follow with Urology.    3.  Hypothyroid:  Taking Synthroid as prescribed.  Good control of thyroid.   Lab Results   Component Value Date    TSH 0.902 10/22/2019       4.  GERD:  Taking Prilosec daily for acid reflux control.  States he has to take his medicine to prevent symptoms.    5.  Anxiety/depression:  Good control on the Wellbutrin.  Would like to continue at current dose.    See review of systems.    The following portion of the patients history was reviewed and updated as appropriate: allergies, current medications, past medical and surgical history. Past social history and problem list reviewed. Family PMH and Past social history reviewed. Tobacco, Illicit drug use reviewed.      Review of patient's allergies indicates:  No Known Allergies      Current Outpatient Medications:     buPROPion (WELLBUTRIN XL) 300 MG 24 hr tablet, TAKE ONE TABLET (300 MG TOTAL) BY MOUTH ONCE DAILY, Disp: 30 tablet, Rfl: 4    cyclobenzaprine (FLEXERIL) 10 MG  tablet, Take 10 mg by mouth 3 (three) times daily as needed for Muscle spasms., Disp: , Rfl:     fenofibrate 160 MG Tab, Take 1 tablet (160 mg total) by mouth once daily., Disp: 90 tablet, Rfl: 1    ketoconazole (NIZORAL) 2 % shampoo, APPLY ONE APPLICATION TOPICALLY TWICE A WEEK, Disp: 120 mL, Rfl: 5    levothyroxine (SYNTHROID) 75 MCG tablet, Take 1 tablet (75 mcg total) by mouth once daily., Disp: 30 tablet, Rfl: 11    OLUX-E 0.05 % topical foam, Apply 0.05 % topically continuous prn., Disp: , Rfl:     omeprazole (PRILOSEC) 40 MG capsule, TAKE ONE CAPSULE BY MOUTH EVERY DAY, Disp: 90 capsule, Rfl: 1    sildenafil (VIAGRA) 100 MG tablet, TAKE ONE TABLET (100 MG TOTAL) BY MOUTH DAILY AS NEEDED FOR ERECTILE DYSFUNCTION, Disp: 6 tablet, Rfl: 6    testosterone cypionate (DEPOTESTOTERONE CYPIONATE) 200 mg/mL injection, INJECT 0.75MLS (150 MG) INTRAMUSCULARLY EVERY 7 DAYS, Disp: 10 mL, Rfl: 0    Current Facility-Administered Medications:     triamcinolone acetonide injection 80 mg, 80 mg, Intra-articular, 1 time in Clinic/HOD, Arben Rao MD    Past Medical History:   Diagnosis Date    Anxiety     Depression     GERD (gastroesophageal reflux disease)     Hyperlipidemia     Hypotestosteronemia     Hypothyroidism     Nephrolithiasis     2 episodes    Obesity     Snoring     Syncope and collapse        Past Surgical History:   Procedure Laterality Date    CYST REMOVAL      left wrist    FRACTURE SURGERY      screw in right thumb    testopel      by Dr. Martini in the past for his hypogonadism.    TONSILLECTOMY, ADENOIDECTOMY, BILATERAL MYRINGOTOMY AND TUBES         Social History     Socioeconomic History    Marital status:      Spouse name: Not on file    Number of children: Not on file    Years of education: Not on file    Highest education level: Not on file   Occupational History    Occupation: [employer]   Social Needs    Financial resource strain: Not on file    Food  insecurity:     Worry: Not on file     Inability: Not on file    Transportation needs:     Medical: Not on file     Non-medical: Not on file   Tobacco Use    Smoking status: Former Smoker    Smokeless tobacco: Current User     Types: Chew    Tobacco comment: chew everyday   Substance and Sexual Activity    Alcohol use: Yes     Alcohol/week: 1.0 standard drinks     Types: 1 Cans of beer per week     Comment: socially    Drug use: No    Sexual activity: Yes     Partners: Female   Lifestyle    Physical activity:     Days per week: Not on file     Minutes per session: Not on file    Stress: Not on file   Relationships    Social connections:     Talks on phone: Not on file     Gets together: Not on file     Attends [employer] service: Not on file     Active member of club or organization: Not on file     Attends meetings of clubs or organizations: Not on file     Relationship status: Not on file   Other Topics Concern    Not on file   Social History Narrative    Not on file     Review of Systems   Constitutional: Negative for fatigue and fever.   HENT: Negative for congestion, postnasal drip, rhinorrhea and sneezing.    Eyes: Negative for itching and visual disturbance.   Respiratory: Negative for cough, chest tightness, shortness of breath and wheezing.    Cardiovascular: Negative for chest pain, palpitations and leg swelling.   Gastrointestinal: Negative for abdominal pain, blood in stool, constipation, diarrhea, nausea and vomiting.   Genitourinary: Negative for decreased urine volume, difficulty urinating and frequency.   Musculoskeletal: Negative for arthralgias, back pain and gait problem.   Skin: Negative for rash.   Neurological: Negative for dizziness and headaches.   Psychiatric/Behavioral: Negative for dysphoric mood, self-injury, sleep disturbance and suicidal ideas. The patient is not nervous/anxious.        Objective:      /86   Pulse 78   Temp 98.4 °F (36.9 °C) (Oral)   Resp 16   Ht 5'  "11" (1.803 m)   Wt 98.2 kg (216 lb 7.9 oz)   SpO2 97%   BMI 30.19 kg/m²      Physical Exam   Constitutional: He is oriented to person, place, and time. He appears well-developed and well-nourished. He is cooperative. No distress.   HENT:   Head: Normocephalic and atraumatic.   Eyes: Pupils are equal, round, and reactive to light. Conjunctivae, EOM and lids are normal. Right eye exhibits no discharge and no exudate. Left eye exhibits no discharge and no exudate.   Neck: Trachea normal, normal range of motion and full passive range of motion without pain. Neck supple. No JVD present. No tracheal tenderness present. Carotid bruit is not present. No tracheal deviation present. No thyroid mass and no thyromegaly present.   Cardiovascular: Normal rate, regular rhythm, S1 normal, S2 normal, normal heart sounds and normal pulses.   No murmur heard.  Pulses:       Carotid pulses are 2+ on the right side, and 2+ on the left side.       Radial pulses are 2+ on the right side, and 2+ on the left side.   Pulmonary/Chest: Effort normal and breath sounds normal. He has no wheezes. He has no rhonchi. He has no rales. He exhibits no tenderness.   Abdominal: Soft. Normal appearance and bowel sounds are normal. He exhibits no distension and no abdominal bruit. There is no hepatosplenomegaly. There is no tenderness. No hernia.   Musculoskeletal: Normal range of motion.   Gait and coordination normal.  strong, equal.  Upper and lower extremity strength is normal.    Lymphadenopathy:     He has no cervical adenopathy.   Neurological: He is alert and oriented to person, place, and time. He has normal strength. He displays no tremor.   Skin: Skin is warm and dry. Capillary refill takes less than 2 seconds. No rash noted.   Psychiatric: He has a normal mood and affect. His speech is normal and behavior is normal. Judgment and thought content normal. His mood appears not anxious. He does not exhibit a depressed mood.     "   Assessment:       1. Hyperlipidemia, unspecified hyperlipidemia type    2. Hypothyroidism due to acquired atrophy of thyroid    3. Hypotestosteronemia    4. Prostate cancer screening    5. Gastroesophageal reflux disease without esophagitis    6. Anxiety        Plan:       Khadar was seen today for follow-up.    Diagnoses and all orders for this visit:    Hyperlipidemia, unspecified hyperlipidemia type:  Good control.  Continue current medication.  -     Comprehensive metabolic panel  -     Lipid panel    Hypothyroidism due to acquired atrophy of thyroid:  Good control continue current dose of thyroid medication.  -     TSH    Hypotestosteronemia.:  Continue current medication.  Continue to follow with Urology.  -     Testosterone; Future  -     Testosterone    Prostate cancer screening:  PSA in normal range.  -     PSA, Screening    Gastroesophageal reflux disease without esophagitis:  Good control with current medication.  Continue current dose.    Anxiety:  Good control with Wellbutrin.  Continue current dose.    Other orders  -     Influenza - Quadrivalent (PF)      Continue current medication  Take medications only as prescribed  Healthy diet, exercise  Adequate rest  Adequate hydration  Avoid allergens  Avoid excessive caffeine     Follow up in 6 months, unless issues arise.    This note was created using [employer] voice recognition software that occasionally misinterpreted phrases or words

## 2019-10-23 ENCOUNTER — PATIENT MESSAGE (OUTPATIENT)
Dept: FAMILY MEDICINE | Facility: CLINIC | Age: 44
End: 2019-10-23

## 2019-10-23 LAB
TESTOST SERPL-MCNC: 558 NG/DL (ref 304–1227)
TSH SERPL DL<=0.005 MIU/L-ACNC: 0.9 UIU/ML (ref 0.4–4)

## 2019-12-03 DIAGNOSIS — E03.4 HYPOTHYROIDISM DUE TO ACQUIRED ATROPHY OF THYROID: ICD-10-CM

## 2019-12-03 DIAGNOSIS — E78.1 HYPERTRIGLYCERIDEMIA: ICD-10-CM

## 2019-12-03 RX ORDER — FENOFIBRATE 160 MG/1
TABLET ORAL
Qty: 90 TABLET | Refills: 1 | Status: SHIPPED | OUTPATIENT
Start: 2019-12-03 | End: 2020-06-15

## 2019-12-03 RX ORDER — LEVOTHYROXINE SODIUM 75 UG/1
TABLET ORAL
Qty: 90 TABLET | Refills: 1 | Status: SHIPPED | OUTPATIENT
Start: 2019-12-03 | End: 2020-06-15

## 2019-12-03 RX ORDER — OMEPRAZOLE 40 MG/1
CAPSULE, DELAYED RELEASE ORAL
Qty: 90 CAPSULE | Refills: 1 | Status: SHIPPED | OUTPATIENT
Start: 2019-12-03 | End: 2020-06-15

## 2019-12-12 ENCOUNTER — CLINICAL SUPPORT (OUTPATIENT)
Dept: URGENT CARE | Facility: CLINIC | Age: 44
End: 2019-12-12

## 2019-12-12 PROCEDURE — 99499 PR PHYSICAL - DOT/CDL: ICD-10-PCS | Mod: S$GLB,,, | Performed by: EMERGENCY MEDICINE

## 2019-12-12 PROCEDURE — 99499 UNLISTED E&M SERVICE: CPT | Mod: S$GLB,,, | Performed by: EMERGENCY MEDICINE

## 2020-02-19 RX ORDER — TESTOSTERONE CYPIONATE 200 MG/ML
INJECTION, SOLUTION INTRAMUSCULAR
Qty: 10 ML | Refills: 0 | Status: SHIPPED | OUTPATIENT
Start: 2020-02-19 | End: 2020-06-15

## 2020-04-14 ENCOUNTER — LAB VISIT (OUTPATIENT)
Dept: URGENT CARE | Facility: CLINIC | Age: 45
End: 2020-04-14
Payer: COMMERCIAL

## 2020-04-14 ENCOUNTER — OFFICE VISIT (OUTPATIENT)
Dept: URGENT CARE | Facility: CLINIC | Age: 45
End: 2020-04-14
Payer: OTHER MISCELLANEOUS

## 2020-04-14 VITALS — TEMPERATURE: 98 F | OXYGEN SATURATION: 99 % | HEART RATE: 70 BPM

## 2020-04-14 DIAGNOSIS — Z20.822 EXPOSURE TO COVID-19 VIRUS: Primary | ICD-10-CM

## 2020-04-14 DIAGNOSIS — Z20.822 EXPOSURE TO COVID-19 VIRUS: ICD-10-CM

## 2020-04-14 PROCEDURE — 99204 PR OFFICE/OUTPT VISIT, NEW, LEVL IV, 45-59 MIN: ICD-10-PCS | Mod: 95,,, | Performed by: PREVENTIVE MEDICINE

## 2020-04-14 PROCEDURE — 99204 OFFICE O/P NEW MOD 45 MIN: CPT | Mod: 95,,, | Performed by: PREVENTIVE MEDICINE

## 2020-04-14 PROCEDURE — U0002 COVID-19 LAB TEST NON-CDC: HCPCS

## 2020-04-14 NOTE — LETTER
[address] [address], [address] ? [place], [address] ? Phone [phone] ? Fax [phone] ? [email or web address]          Return to Work/School    Patient: Khadar Lorenzo  YOB: 1975   Date: 04/14/2020      To Whom It May Concern:     Khadar Lorenzo was in contact with/seen in my office on 04/14/2020. COVID-19 is present in our communities across the state. There is limited testing for COVID at this time, so not all patients can be tested. In this situation, your employee meets the following criteria:     Khadar Lorenzo has met the criteria for COVID-19 testing based upon symptoms, travel, and/or potential exposure. The test has been completed and is pending results at this time. During this time the employee is not able to work and should be quarantined per the Centers for Disease Control timelines.      If you have any questions or concerns, or if I can be of further assistance, please do not hesitate to contact me.     Sincerely,    [facility], [place]

## 2020-04-14 NOTE — PROGRESS NOTES
Patient is stable and in no acute distress. Repeat O2 sat 96%. Patient is able to speak fluid sentences without SOB. Denies chest pain. Strict ER precautions advised.

## 2020-04-14 NOTE — PROGRESS NOTES
Subjective:       Patient ID: Khadar Lorenzo is a 44 y.o. male.    Chief Complaint: return to work  Patient has been caring for his parents 1 of which has been confirmed to have COVID-19 illness.  He stopped any contact with both parents and has remained home in self-quarantine since April 1st 2020 he indicates that he has not had any significant symptoms.  However he is concerned about his with this illness prior to return to work.  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  Although he has not had any symptoms nor taken any medication for the past 7 days it is my recommendation that he undergo testing prior to return to work.      Constitution: Negative.   HENT: Negative.    Neck: negative.   Cardiovascular: Negative.    Eyes: Negative.    Respiratory: Negative.    Gastrointestinal: Negative.    Endocrine: negative.   Genitourinary: Negative.    Musculoskeletal: Negative.    Skin: Negative.    Allergic/Immunologic: Negative.    Neurological: Negative.    Hematologic/Lymphatic: Negative.    Psychiatric/Behavioral: Negative.         Objective:      Physical Exam   Constitutional: He is oriented to person, place, and time. He appears well-developed and well-nourished.   HENT:   Head: Normocephalic.   Eyes: Pupils are equal, round, and reactive to light.   Neck: Normal range of motion.   Cardiovascular: Normal rate.   Pulmonary/Chest: Effort normal.   Musculoskeletal: Normal range of motion.   Neurological: He is alert and oriented to person, place, and time.   Skin: Skin is warm and dry.   Psychiatric: He has a normal mood and affect.   Nursing note and vitals reviewed.      Assessment:       1. Exposure to Covid-19 Virus        Plan:       As patient needs to be cleared for return to work a test to confirm his viral status will be performed prior to his return to work.            Follow up if symptoms worsen or fail to improve.

## 2020-04-15 LAB — SARS-COV-2 RNA RESP QL NAA+PROBE: NOT DETECTED

## 2020-06-01 RX ORDER — SILDENAFIL 100 MG/1
TABLET, FILM COATED ORAL
Qty: 6 TABLET | Refills: 2 | Status: SHIPPED | OUTPATIENT
Start: 2020-06-01 | End: 2020-11-10 | Stop reason: SDUPTHER

## 2020-09-25 DIAGNOSIS — E78.1 HYPERTRIGLYCERIDEMIA: ICD-10-CM

## 2020-09-25 DIAGNOSIS — E03.4 HYPOTHYROIDISM DUE TO ACQUIRED ATROPHY OF THYROID: ICD-10-CM

## 2020-09-25 RX ORDER — TESTOSTERONE CYPIONATE 200 MG/ML
INJECTION, SOLUTION INTRAMUSCULAR
Qty: 10 ML | Refills: 0 | OUTPATIENT
Start: 2020-09-25

## 2020-09-25 RX ORDER — LEVOTHYROXINE SODIUM 75 UG/1
75 TABLET ORAL
Qty: 30 TABLET | Refills: 0 | Status: SHIPPED | OUTPATIENT
Start: 2020-09-25 | End: 2021-02-20 | Stop reason: SDUPTHER

## 2020-09-25 RX ORDER — SILDENAFIL 100 MG/1
100 TABLET, FILM COATED ORAL
Qty: 6 TABLET | Refills: 2 | OUTPATIENT
Start: 2020-09-25

## 2020-09-25 RX ORDER — BUPROPION HYDROCHLORIDE 300 MG/1
300 TABLET ORAL DAILY
Qty: 30 TABLET | Refills: 0 | Status: SHIPPED | OUTPATIENT
Start: 2020-09-25 | End: 2021-02-20 | Stop reason: SDUPTHER

## 2020-09-25 RX ORDER — FENOFIBRATE 160 MG/1
160 TABLET ORAL DAILY
Qty: 30 TABLET | Refills: 0 | Status: SHIPPED | OUTPATIENT
Start: 2020-09-25 | End: 2021-02-20 | Stop reason: SDUPTHER

## 2020-09-25 RX ORDER — OMEPRAZOLE 40 MG/1
40 CAPSULE, DELAYED RELEASE ORAL DAILY
Qty: 30 CAPSULE | Refills: 0 | Status: SHIPPED | OUTPATIENT
Start: 2020-09-25 | End: 2021-02-20 | Stop reason: SDUPTHER

## 2020-09-25 NOTE — TELEPHONE ENCOUNTER
He needs office visit. Last seen 11 months ago. Will give one 30 day supply of his medications. No more until he comes in for visit.

## 2020-10-16 ENCOUNTER — OFFICE VISIT (OUTPATIENT)
Dept: FAMILY MEDICINE | Facility: CLINIC | Age: 45
End: 2020-10-16
Payer: COMMERCIAL

## 2020-10-16 VITALS
DIASTOLIC BLOOD PRESSURE: 70 MMHG | RESPIRATION RATE: 18 BRPM | WEIGHT: 207.88 LBS | HEART RATE: 70 BPM | HEIGHT: 71 IN | SYSTOLIC BLOOD PRESSURE: 128 MMHG | TEMPERATURE: 97 F | BODY MASS INDEX: 29.1 KG/M2

## 2020-10-16 DIAGNOSIS — E29.1 MALE HYPOGONADISM: ICD-10-CM

## 2020-10-16 DIAGNOSIS — F41.9 ANXIETY: ICD-10-CM

## 2020-10-16 DIAGNOSIS — E78.1 HYPERTRIGLYCERIDEMIA: ICD-10-CM

## 2020-10-16 DIAGNOSIS — Z11.4 SCREENING FOR HIV WITHOUT PRESENCE OF RISK FACTORS: ICD-10-CM

## 2020-10-16 DIAGNOSIS — K21.9 GASTROESOPHAGEAL REFLUX DISEASE WITHOUT ESOPHAGITIS: ICD-10-CM

## 2020-10-16 DIAGNOSIS — Z01.84 ENCOUNTER FOR ANTIBODY RESPONSE EXAMINATION: ICD-10-CM

## 2020-10-16 DIAGNOSIS — J90 PLEURAL EFFUSION: ICD-10-CM

## 2020-10-16 DIAGNOSIS — N52.9 ERECTILE DYSFUNCTION, UNSPECIFIED ERECTILE DYSFUNCTION TYPE: ICD-10-CM

## 2020-10-16 DIAGNOSIS — Z20.822 CLOSE EXPOSURE TO COVID-19 VIRUS: ICD-10-CM

## 2020-10-16 DIAGNOSIS — Z00.00 ANNUAL PHYSICAL EXAM: Primary | ICD-10-CM

## 2020-10-16 DIAGNOSIS — Z12.5 PROSTATE CANCER SCREENING: ICD-10-CM

## 2020-10-16 DIAGNOSIS — E03.8 SECONDARY HYPOTHYROIDISM: ICD-10-CM

## 2020-10-16 DIAGNOSIS — Z11.59 NEED FOR HEPATITIS C SCREENING TEST: ICD-10-CM

## 2020-10-16 DIAGNOSIS — E22.1 HYPERPROLACTINEMIA: ICD-10-CM

## 2020-10-16 PROCEDURE — 99396 PR PREVENTIVE VISIT,EST,40-64: ICD-10-PCS | Mod: 25,S$GLB,, | Performed by: NURSE PRACTITIONER

## 2020-10-16 PROCEDURE — 80053 COMPREHEN METABOLIC PANEL: CPT

## 2020-10-16 PROCEDURE — 84153 ASSAY OF PSA TOTAL: CPT

## 2020-10-16 PROCEDURE — 86703 HIV-1/HIV-2 1 RESULT ANTBDY: CPT

## 2020-10-16 PROCEDURE — 3008F PR BODY MASS INDEX (BMI) DOCUMENTED: ICD-10-PCS | Mod: CPTII,S$GLB,, | Performed by: NURSE PRACTITIONER

## 2020-10-16 PROCEDURE — 90686 IIV4 VACC NO PRSV 0.5 ML IM: CPT | Mod: S$GLB,,, | Performed by: NURSE PRACTITIONER

## 2020-10-16 PROCEDURE — 99396 PREV VISIT EST AGE 40-64: CPT | Mod: 25,S$GLB,, | Performed by: NURSE PRACTITIONER

## 2020-10-16 PROCEDURE — 90471 FLU VACCINE (QUAD) GREATER THAN OR EQUAL TO 3YO PRESERVATIVE FREE IM: ICD-10-PCS | Mod: S$GLB,,, | Performed by: NURSE PRACTITIONER

## 2020-10-16 PROCEDURE — 90471 IMMUNIZATION ADMIN: CPT | Mod: S$GLB,,, | Performed by: NURSE PRACTITIONER

## 2020-10-16 PROCEDURE — 85025 COMPLETE CBC W/AUTO DIFF WBC: CPT

## 2020-10-16 PROCEDURE — 84146 ASSAY OF PROLACTIN: CPT

## 2020-10-16 PROCEDURE — 86803 HEPATITIS C AB TEST: CPT

## 2020-10-16 PROCEDURE — 86769 SARS-COV-2 COVID-19 ANTIBODY: CPT

## 2020-10-16 PROCEDURE — 3008F BODY MASS INDEX DOCD: CPT | Mod: CPTII,S$GLB,, | Performed by: NURSE PRACTITIONER

## 2020-10-16 PROCEDURE — 90686 FLU VACCINE (QUAD) GREATER THAN OR EQUAL TO 3YO PRESERVATIVE FREE IM: ICD-10-PCS | Mod: S$GLB,,, | Performed by: NURSE PRACTITIONER

## 2020-10-16 PROCEDURE — 84403 ASSAY OF TOTAL TESTOSTERONE: CPT

## 2020-10-16 PROCEDURE — 80061 LIPID PANEL: CPT

## 2020-10-16 PROCEDURE — 36415 COLL VENOUS BLD VENIPUNCTURE: CPT

## 2020-10-16 PROCEDURE — 84443 ASSAY THYROID STIM HORMONE: CPT

## 2020-10-16 RX ORDER — LEVOFLOXACIN 500 MG/1
TABLET, FILM COATED ORAL
COMMUNITY
Start: 2020-10-14 | End: 2020-10-16 | Stop reason: ALTCHOICE
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
## 2020-10-16 NOTE — PROGRESS NOTES
Venipuncture performed with 21 gauge butterfly, x's 1 attempt,  to L Antecubital vein.  Specimens collected per orders.      Pressure dressing applied to site, instructed patient to remove dressing in 10-15 minutes, OK to re-adjust dressing if pressure causing any discomfort, to observe closely for numbness and/or discoloration to hand or fingers, and to notify provider if bleeding persists after applying constant pressure lasting 30 minutes.

## 2020-10-17 LAB
ALBUMIN SERPL BCP-MCNC: 4.4 G/DL (ref 3.5–5.2)
ALP SERPL-CCNC: 53 U/L (ref 55–135)
ALT SERPL W/O P-5'-P-CCNC: 27 U/L (ref 10–44)
ANION GAP SERPL CALC-SCNC: 9 MMOL/L (ref 8–16)
AST SERPL-CCNC: 24 U/L (ref 10–40)
BASOPHILS # BLD AUTO: 0.05 K/UL (ref 0–0.2)
BASOPHILS NFR BLD: 0.9 % (ref 0–1.9)
BILIRUB SERPL-MCNC: 0.7 MG/DL (ref 0.1–1)
BUN SERPL-MCNC: 15 MG/DL (ref 6–20)
CALCIUM SERPL-MCNC: 9.1 MG/DL (ref 8.7–10.5)
CHLORIDE SERPL-SCNC: 105 MMOL/L (ref 95–110)
CHOLEST SERPL-MCNC: 150 MG/DL (ref 120–199)
CHOLEST/HDLC SERPL: 4.1 {RATIO} (ref 2–5)
CO2 SERPL-SCNC: 28 MMOL/L (ref 23–29)
COMPLEXED PSA SERPL-MCNC: 1.5 NG/ML (ref 0–4)
CREAT SERPL-MCNC: 1 MG/DL (ref 0.5–1.4)
DIFFERENTIAL METHOD: ABNORMAL
EOSINOPHIL # BLD AUTO: 0.2 K/UL (ref 0–0.5)
EOSINOPHIL NFR BLD: 2.6 % (ref 0–8)
ERYTHROCYTE [DISTWIDTH] IN BLOOD BY AUTOMATED COUNT: 13.1 % (ref 11.5–14.5)
EST. GFR  (AFRICAN AMERICAN): >60 ML/MIN/1.73 M^2
EST. GFR  (NON AFRICAN AMERICAN): >60 ML/MIN/1.73 M^2
GLUCOSE SERPL-MCNC: 104 MG/DL (ref 70–110)
HCT VFR BLD AUTO: 50.8 % (ref 40–54)
HDLC SERPL-MCNC: 37 MG/DL (ref 40–75)
HDLC SERPL: 24.7 % (ref 20–50)
HGB BLD-MCNC: 15.7 G/DL (ref 14–18)
IMM GRANULOCYTES # BLD AUTO: 0.02 K/UL (ref 0–0.04)
IMM GRANULOCYTES NFR BLD AUTO: 0.3 % (ref 0–0.5)
LDLC SERPL CALC-MCNC: 83 MG/DL (ref 63–159)
LYMPHOCYTES # BLD AUTO: 2.2 K/UL (ref 1–4.8)
LYMPHOCYTES NFR BLD: 38.1 % (ref 18–48)
MCH RBC QN AUTO: 27 PG (ref 27–31)
MCHC RBC AUTO-ENTMCNC: 30.9 G/DL (ref 32–36)
MCV RBC AUTO: 87 FL (ref 82–98)
MONOCYTES # BLD AUTO: 0.5 K/UL (ref 0.3–1)
MONOCYTES NFR BLD: 8.2 % (ref 4–15)
NEUTROPHILS # BLD AUTO: 2.9 K/UL (ref 1.8–7.7)
NEUTROPHILS NFR BLD: 49.9 % (ref 38–73)
NONHDLC SERPL-MCNC: 113 MG/DL
NRBC BLD-RTO: 0 /100 WBC
PLATELET # BLD AUTO: 218 K/UL (ref 150–350)
PMV BLD AUTO: 10.3 FL (ref 9.2–12.9)
POTASSIUM SERPL-SCNC: 4 MMOL/L (ref 3.5–5.1)
PROLACTIN SERPL IA-MCNC: 9.9 NG/ML (ref 3.5–19.4)
PROT SERPL-MCNC: 7.2 G/DL (ref 6–8.4)
RBC # BLD AUTO: 5.81 M/UL (ref 4.6–6.2)
SARS-COV-2 IGG SERPLBLD QL IA.RAPID: NEGATIVE
SODIUM SERPL-SCNC: 142 MMOL/L (ref 136–145)
TESTOST SERPL-MCNC: 180 NG/DL (ref 304–1227)
TRIGL SERPL-MCNC: 150 MG/DL (ref 30–150)
TSH SERPL DL<=0.005 MIU/L-ACNC: 0.96 UIU/ML (ref 0.4–4)
WBC # BLD AUTO: 5.88 K/UL (ref 3.9–12.7)

## 2020-10-19 ENCOUNTER — PATIENT MESSAGE (OUTPATIENT)
Dept: FAMILY MEDICINE | Facility: CLINIC | Age: 45
End: 2020-10-19

## 2020-10-19 LAB
HCV AB SERPL QL IA: NEGATIVE
HIV 1+2 AB+HIV1 P24 AG SERPL QL IA: NEGATIVE

## 2020-10-21 NOTE — PROGRESS NOTES
[facility] Note  Staff: JUJU DamonC    PCP: Talita Strickland NP    Chief Complaint: Male Hypogonadism    Subjective:        HPI: Khadar Lorenzo is a 44 y.o. male NEW PT presents today for evaluation of recent low testosterone level. Pt has hx of erectile dysfunction also.    Questions asked the pt during ov today:  Pt has been taking Testosterone injections on and off for several years now by his PCP office.  Dysuria: No  Gross Hematuria:No  Straining:No, Hesistancy:No, Intermittency:No}, Weak stream:No  ED:Yes - he currently takes Viagra which does well for him.    LAB RESULTS:  Testosterone, Total Lab Results:  10/16/20:  180 at 10:06 am (Pt has not been on Testosterone in over 2-3 months)  10/22/19:  558 at 9:42 am  04/09/19:  271  At 8:40 am  08/03/18:  353 at 0741    10/16/2020: Prolactin level-9.9    BUN/Cr:  15/1.0  GFR of >60.0    Last PSA Screening:   Lab Results   Component Value Date    PSA 1.5 10/16/2020    PSA 1.3 10/22/2019    PSA 1.7 03/11/2019    PSADIAG 1.6 03/19/2014     AUA SS Today:  1/0 Delighted  Nocturia 1x nightly    REVIEW OF SYSTEMS:  A comprehensive 10 system review was performed and is negative except as noted above in HPI    PMHx:  Past Medical History:   Diagnosis Date    Anxiety     Depression     GERD (gastroesophageal reflux disease)     Hyperlipidemia     Hypotestosteronemia     Hypothyroidism     Nephrolithiasis     2 episodes    Obesity     Snoring     Syncope and collapse      PSHx:  Past Surgical History:   Procedure Laterality Date    CYST REMOVAL      left wrist    FRACTURE SURGERY      screw in right thumb    testopel      by Dr. Martini in the past for his hypogonadism.    TONSILLECTOMY, ADENOIDECTOMY, BILATERAL MYRINGOTOMY AND TUBES       Fam Hx:   malignancies: No    kidney stones: No     Allergies:  Patient has no known allergies.    Medications: reviewed   Objective:     Vitals:    10/23/20 0800   BP: 117/72  "  Pulse: 67   Resp: 18     General:WDWN in NAD  Eyes: PERRLA, normal conjunctiva  Respiratory: no increased work on breathing, clear to auscultation  Cardiovascular: regular rate and rhythm. No obvious extremity edema.  GI: palpation of masses. No tenderness. No hepatosplenomegaly to palpation.  Musculoskeletal: normal range of motion of bilateral upper extremities. Normal muscle strength and tone.  Skin: no obvious rashes or lesions. No tightening of skin noted.  Neurologic: CN grossly normal. Normal sensation.   Psychiatric: awake, alert and oriented x 3. Mood and affect normal. Cooperative.    Assessment:       1. Low testosterone in male    2. Male hypogonadism    3. Erectile dysfunction, unspecified erectile dysfunction type          Plan:   Low testosterone levels, Hx of ED:    Testosterone 200 mg/mL refilled for pt during office visit today.  Dosage will be as follows:  "1 mL IM every 7 days."    We will recheck Testosterone level in one month and then check a Testosterone, PSA, and CBC 3 months later.  Pt was instructed thoroughly by me today on how to get lab work done (Fasting, no later than 9 in the morning, and 2 days prior to next scheduled dosage)   Depending on future lab results we will adjust medication accordingly at the appropriate times.    The medication prescribed to pt today is as trial to see if med improves pt's current LUTS, even though pt has been taking TRT for several years now prescribed by his PCP office.  Benefits, risks and side affects were thoroughly explained to pt today in office with all questions answered.    F/u--We will contact pt AFTER we review first repeat lab work in one month for further instructions at that time.  Pt verbalized understanding.    [facility]: Active    Bell Knox, ABBYP-C    "

## 2020-10-23 ENCOUNTER — PATIENT MESSAGE (OUTPATIENT)
Dept: FAMILY MEDICINE | Facility: CLINIC | Age: 45
End: 2020-10-23

## 2020-10-23 ENCOUNTER — HOSPITAL ENCOUNTER (OUTPATIENT)
Dept: RADIOLOGY | Facility: CLINIC | Age: 45
Discharge: HOME | End: 2020-10-23
Attending: NURSE PRACTITIONER
Payer: COMMERCIAL

## 2020-10-23 ENCOUNTER — OFFICE VISIT (OUTPATIENT)
Dept: UROLOGY | Facility: CLINIC | Age: 45
End: 2020-10-23
Payer: COMMERCIAL

## 2020-10-23 VITALS
HEIGHT: 71 IN | BODY MASS INDEX: 28.7 KG/M2 | RESPIRATION RATE: 18 BRPM | DIASTOLIC BLOOD PRESSURE: 72 MMHG | SYSTOLIC BLOOD PRESSURE: 117 MMHG | HEART RATE: 67 BPM | WEIGHT: 205 LBS

## 2020-10-23 DIAGNOSIS — J90 PLEURAL EFFUSION: ICD-10-CM

## 2020-10-23 DIAGNOSIS — N52.9 ERECTILE DYSFUNCTION, UNSPECIFIED ERECTILE DYSFUNCTION TYPE: ICD-10-CM

## 2020-10-23 DIAGNOSIS — R79.89 LOW TESTOSTERONE IN MALE: Primary | ICD-10-CM

## 2020-10-23 DIAGNOSIS — E29.1 MALE HYPOGONADISM: ICD-10-CM

## 2020-10-23 PROCEDURE — 99204 PR OFFICE/OUTPT VISIT, NEW, LEVL IV, 45-59 MIN: ICD-10-PCS | Mod: S$GLB,,, | Performed by: NURSE PRACTITIONER

## 2020-10-23 PROCEDURE — 99999 PR PBB SHADOW E&M-EST. PATIENT-LVL V: ICD-10-PCS | Mod: PBBFAC,,, | Performed by: NURSE PRACTITIONER

## 2020-10-23 PROCEDURE — 99204 OFFICE O/P NEW MOD 45 MIN: CPT | Mod: S$GLB,,, | Performed by: NURSE PRACTITIONER

## 2020-10-23 PROCEDURE — 71046 X-RAY EXAM CHEST 2 VIEWS: CPT | Mod: 26,,, | Performed by: RADIOLOGY

## 2020-10-23 PROCEDURE — 99999 PR PBB SHADOW E&M-EST. PATIENT-LVL V: CPT | Mod: PBBFAC,,, | Performed by: NURSE PRACTITIONER

## 2020-10-23 PROCEDURE — 71046 XR CHEST PA AND LATERAL: ICD-10-PCS | Mod: 26,,, | Performed by: RADIOLOGY

## 2020-10-23 PROCEDURE — 3008F PR BODY MASS INDEX (BMI) DOCUMENTED: ICD-10-PCS | Mod: CPTII,S$GLB,, | Performed by: NURSE PRACTITIONER

## 2020-10-23 PROCEDURE — 3008F BODY MASS INDEX DOCD: CPT | Mod: CPTII,S$GLB,, | Performed by: NURSE PRACTITIONER

## 2020-10-23 PROCEDURE — 71046 X-RAY EXAM CHEST 2 VIEWS: CPT | Mod: TC,FY,PO

## 2020-10-23 RX ORDER — TESTOSTERONE CYPIONATE 200 MG/ML
INJECTION, SOLUTION INTRAMUSCULAR
Qty: 10 ML | Refills: 2 | Status: SHIPPED | OUTPATIENT
Start: 2020-10-23 | End: 2021-03-05 | Stop reason: SDUPTHER

## 2020-10-23 NOTE — LETTER
October 23, 2020      Talita Strickland, PEDRITO  [address] [facility] [address]  [facility] [address]           [place] [facility]  [address] [place] [address] [address]   [facility] [address]  Phone: [phone]  Fax: [phone]          Patient: Khadar Lorenzo   MR Number: [id number]   YOB: 1975   Date of Visit: 10/23/2020       Dear Talita Strickland:    Thank you for referring Khadar Lorenzo to me for evaluation. Attached you will find relevant portions of my assessment and plan of care.    If you have questions, please do not hesitate to call me. I look forward to following Khadar Lorenzo along with you.    Sincerely,    Bell Knox, [facility]    Enclosure  CC:  No Recipients    If you would like to receive this communication electronically, please contact [email or web address] or [phone] to request more information on [employer] access.    For providers and/or their staff who would like to refer a patient to [facility], please contact us through our one-stop-shop provider referral line, [facility] , at [phone].    If you feel you have received this communication in error or would no longer like to receive these types of communications, please e-mail [email or web address]

## 2020-11-10 ENCOUNTER — PATIENT MESSAGE (OUTPATIENT)
Dept: UROLOGY | Facility: CLINIC | Age: 45
End: 2020-11-10

## 2020-11-10 DIAGNOSIS — N52.9 ERECTILE DYSFUNCTION, UNSPECIFIED ERECTILE DYSFUNCTION TYPE: Primary | ICD-10-CM

## 2020-11-10 RX ORDER — SILDENAFIL 100 MG/1
TABLET, FILM COATED ORAL
Qty: 6 TABLET | Refills: 2 | Status: SHIPPED | OUTPATIENT
Start: 2020-11-10 | End: 2020-12-15 | Stop reason: ALTCHOICE

## 2020-11-19 ENCOUNTER — PATIENT MESSAGE (OUTPATIENT)
Dept: UROLOGY | Facility: CLINIC | Age: 45
End: 2020-11-19

## 2020-11-24 ENCOUNTER — LAB VISIT (OUTPATIENT)
Dept: LAB | Facility: HOSPITAL | Age: 45
End: 2020-11-24
Attending: NURSE PRACTITIONER
Payer: COMMERCIAL

## 2020-11-24 DIAGNOSIS — E29.1 MALE HYPOGONADISM: ICD-10-CM

## 2020-11-24 DIAGNOSIS — N52.9 ERECTILE DYSFUNCTION, UNSPECIFIED ERECTILE DYSFUNCTION TYPE: Primary | ICD-10-CM

## 2020-11-24 DIAGNOSIS — R79.89 LOW TESTOSTERONE IN MALE: ICD-10-CM

## 2020-11-24 LAB — TESTOST SERPL-MCNC: 774 NG/DL (ref 304–1227)

## 2020-11-24 PROCEDURE — 36415 COLL VENOUS BLD VENIPUNCTURE: CPT

## 2020-11-24 PROCEDURE — 84403 ASSAY OF TOTAL TESTOSTERONE: CPT

## 2020-12-10 ENCOUNTER — PATIENT MESSAGE (OUTPATIENT)
Dept: FAMILY MEDICINE | Facility: CLINIC | Age: 45
End: 2020-12-10

## 2020-12-14 ENCOUNTER — PATIENT MESSAGE (OUTPATIENT)
Dept: FAMILY MEDICINE | Facility: CLINIC | Age: 45
End: 2020-12-14

## 2020-12-14 DIAGNOSIS — N40.1 BENIGN PROSTATIC HYPERPLASIA WITH NOCTURIA: Primary | ICD-10-CM

## 2020-12-14 DIAGNOSIS — R35.1 BENIGN PROSTATIC HYPERPLASIA WITH NOCTURIA: Primary | ICD-10-CM

## 2020-12-15 RX ORDER — TADALAFIL 5 MG/1
5 TABLET ORAL DAILY
Qty: 30 TABLET | Refills: 6 | Status: SHIPPED | OUTPATIENT
Start: 2020-12-15 | End: 2021-03-05 | Stop reason: ALTCHOICE

## 2021-02-05 ENCOUNTER — PATIENT MESSAGE (OUTPATIENT)
Dept: UROLOGY | Facility: CLINIC | Age: 46
End: 2021-02-05

## 2021-02-08 ENCOUNTER — LAB VISIT (OUTPATIENT)
Dept: LAB | Facility: HOSPITAL | Age: 46
End: 2021-02-08
Attending: NURSE PRACTITIONER
Payer: COMMERCIAL

## 2021-02-08 DIAGNOSIS — R79.89 LOW TESTOSTERONE IN MALE: ICD-10-CM

## 2021-02-08 DIAGNOSIS — E29.1 MALE HYPOGONADISM: ICD-10-CM

## 2021-02-08 LAB
COMPLEXED PSA SERPL-MCNC: 1.5 NG/ML (ref 0–4)
ERYTHROCYTE [DISTWIDTH] IN BLOOD BY AUTOMATED COUNT: 12.4 % (ref 11.5–14.5)
HCT VFR BLD AUTO: 54.7 % (ref 40–54)
HGB BLD-MCNC: 17 G/DL (ref 14–18)
MCH RBC QN AUTO: 26.8 PG (ref 27–31)
MCHC RBC AUTO-ENTMCNC: 31.1 G/DL (ref 32–36)
MCV RBC AUTO: 86 FL (ref 82–98)
PLATELET # BLD AUTO: 212 K/UL (ref 150–350)
PMV BLD AUTO: 9.8 FL (ref 9.2–12.9)
RBC # BLD AUTO: 6.35 M/UL (ref 4.6–6.2)
TESTOST SERPL-MCNC: 842 NG/DL (ref 304–1227)
WBC # BLD AUTO: 7.19 K/UL (ref 3.9–12.7)

## 2021-02-08 PROCEDURE — 36415 COLL VENOUS BLD VENIPUNCTURE: CPT

## 2021-02-08 PROCEDURE — 84153 ASSAY OF PSA TOTAL: CPT

## 2021-02-08 PROCEDURE — 84403 ASSAY OF TOTAL TESTOSTERONE: CPT

## 2021-02-08 PROCEDURE — 85027 COMPLETE CBC AUTOMATED: CPT

## 2021-02-18 ENCOUNTER — PATIENT MESSAGE (OUTPATIENT)
Dept: UROLOGY | Facility: CLINIC | Age: 46
End: 2021-02-18

## 2021-02-18 ENCOUNTER — PATIENT MESSAGE (OUTPATIENT)
Dept: FAMILY MEDICINE | Facility: CLINIC | Age: 46
End: 2021-02-18

## 2021-02-18 DIAGNOSIS — E78.1 HYPERTRIGLYCERIDEMIA: ICD-10-CM

## 2021-02-18 DIAGNOSIS — E03.4 HYPOTHYROIDISM DUE TO ACQUIRED ATROPHY OF THYROID: ICD-10-CM

## 2021-02-20 RX ORDER — LEVOTHYROXINE SODIUM 75 UG/1
75 TABLET ORAL
Qty: 90 TABLET | Refills: 2 | Status: SHIPPED | OUTPATIENT
Start: 2021-02-20 | End: 2021-09-21

## 2021-02-20 RX ORDER — OMEPRAZOLE 40 MG/1
40 CAPSULE, DELAYED RELEASE ORAL DAILY
Qty: 90 CAPSULE | Refills: 2 | Status: SHIPPED | OUTPATIENT
Start: 2021-02-20 | End: 2021-09-21

## 2021-02-20 RX ORDER — FENOFIBRATE 160 MG/1
160 TABLET ORAL DAILY
Qty: 90 TABLET | Refills: 2 | Status: SHIPPED | OUTPATIENT
Start: 2021-02-20 | End: 2021-09-21

## 2021-02-20 RX ORDER — BUPROPION HYDROCHLORIDE 300 MG/1
300 TABLET ORAL DAILY
Qty: 90 TABLET | Refills: 2 | Status: SHIPPED | OUTPATIENT
Start: 2021-02-20 | End: 2021-04-30

## 2021-03-05 ENCOUNTER — OFFICE VISIT (OUTPATIENT)
Dept: UROLOGY | Facility: CLINIC | Age: 46
End: 2021-03-05
Payer: COMMERCIAL

## 2021-03-05 VITALS
HEIGHT: 71 IN | DIASTOLIC BLOOD PRESSURE: 86 MMHG | RESPIRATION RATE: 18 BRPM | SYSTOLIC BLOOD PRESSURE: 133 MMHG | BODY MASS INDEX: 29.54 KG/M2 | HEART RATE: 75 BPM | WEIGHT: 211 LBS

## 2021-03-05 DIAGNOSIS — R79.89 LOW TESTOSTERONE IN MALE: Primary | ICD-10-CM

## 2021-03-05 DIAGNOSIS — E29.1 MALE HYPOGONADISM: ICD-10-CM

## 2021-03-05 DIAGNOSIS — N52.9 ERECTILE DYSFUNCTION, UNSPECIFIED ERECTILE DYSFUNCTION TYPE: ICD-10-CM

## 2021-03-05 PROCEDURE — 99214 PR OFFICE/OUTPT VISIT, EST, LEVL IV, 30-39 MIN: ICD-10-PCS | Mod: S$GLB,,, | Performed by: NURSE PRACTITIONER

## 2021-03-05 PROCEDURE — 3008F PR BODY MASS INDEX (BMI) DOCUMENTED: ICD-10-PCS | Mod: CPTII,S$GLB,, | Performed by: NURSE PRACTITIONER

## 2021-03-05 PROCEDURE — 1126F AMNT PAIN NOTED NONE PRSNT: CPT | Mod: S$GLB,,, | Performed by: NURSE PRACTITIONER

## 2021-03-05 PROCEDURE — 99214 OFFICE O/P EST MOD 30 MIN: CPT | Mod: S$GLB,,, | Performed by: NURSE PRACTITIONER

## 2021-03-05 PROCEDURE — 3008F BODY MASS INDEX DOCD: CPT | Mod: CPTII,S$GLB,, | Performed by: NURSE PRACTITIONER

## 2021-03-05 PROCEDURE — 99999 PR PBB SHADOW E&M-EST. PATIENT-LVL IV: CPT | Mod: PBBFAC,,, | Performed by: NURSE PRACTITIONER

## 2021-03-05 PROCEDURE — 1126F PR PAIN SEVERITY QUANTIFIED, NO PAIN PRESENT: ICD-10-PCS | Mod: S$GLB,,, | Performed by: NURSE PRACTITIONER

## 2021-03-05 PROCEDURE — 99999 PR PBB SHADOW E&M-EST. PATIENT-LVL IV: ICD-10-PCS | Mod: PBBFAC,,, | Performed by: NURSE PRACTITIONER

## 2021-03-05 RX ORDER — TADALAFIL 20 MG/1
TABLET ORAL
Qty: 6 TABLET | Refills: 12 | Status: SHIPPED | OUTPATIENT
Start: 2021-03-05 | End: 2022-02-17

## 2021-03-05 RX ORDER — TESTOSTERONE CYPIONATE 200 MG/ML
INJECTION, SOLUTION INTRAMUSCULAR
Qty: 10 ML | Refills: 2 | Status: SHIPPED | OUTPATIENT
Start: 2021-03-05 | End: 2021-08-12 | Stop reason: SDUPTHER

## 2021-03-10 ENCOUNTER — PATIENT MESSAGE (OUTPATIENT)
Dept: UROLOGY | Facility: CLINIC | Age: 46
End: 2021-03-10

## 2021-04-30 ENCOUNTER — OFFICE VISIT (OUTPATIENT)
Dept: FAMILY MEDICINE | Facility: CLINIC | Age: 46
End: 2021-04-30
Payer: COMMERCIAL

## 2021-04-30 DIAGNOSIS — K21.9 GASTROESOPHAGEAL REFLUX DISEASE WITHOUT ESOPHAGITIS: ICD-10-CM

## 2021-04-30 DIAGNOSIS — E03.4 HYPOTHYROIDISM DUE TO ACQUIRED ATROPHY OF THYROID: Primary | ICD-10-CM

## 2021-04-30 DIAGNOSIS — E34.9 HYPOTESTOSTERONEMIA: ICD-10-CM

## 2021-04-30 DIAGNOSIS — E78.5 HYPERLIPIDEMIA, UNSPECIFIED HYPERLIPIDEMIA TYPE: ICD-10-CM

## 2021-04-30 DIAGNOSIS — F41.9 ANXIETY: ICD-10-CM

## 2021-04-30 PROCEDURE — 36415 PR COLLECTION VENOUS BLOOD,VENIPUNCTURE: ICD-10-PCS | Mod: S$GLB,,, | Performed by: NURSE PRACTITIONER

## 2021-04-30 PROCEDURE — 3008F BODY MASS INDEX DOCD: CPT | Mod: CPTII,S$GLB,, | Performed by: NURSE PRACTITIONER

## 2021-04-30 PROCEDURE — 36415 COLL VENOUS BLD VENIPUNCTURE: CPT | Mod: S$GLB,,, | Performed by: NURSE PRACTITIONER

## 2021-04-30 PROCEDURE — 99214 OFFICE O/P EST MOD 30 MIN: CPT | Mod: S$GLB,,, | Performed by: NURSE PRACTITIONER

## 2021-04-30 PROCEDURE — 99214 PR OFFICE/OUTPT VISIT, EST, LEVL IV, 30-39 MIN: ICD-10-PCS | Mod: S$GLB,,, | Performed by: NURSE PRACTITIONER

## 2021-04-30 PROCEDURE — 1126F AMNT PAIN NOTED NONE PRSNT: CPT | Mod: S$GLB,,, | Performed by: NURSE PRACTITIONER

## 2021-04-30 PROCEDURE — 84443 ASSAY THYROID STIM HORMONE: CPT | Performed by: NURSE PRACTITIONER

## 2021-04-30 PROCEDURE — 80061 LIPID PANEL: CPT | Performed by: NURSE PRACTITIONER

## 2021-04-30 PROCEDURE — 80053 COMPREHEN METABOLIC PANEL: CPT | Performed by: NURSE PRACTITIONER

## 2021-04-30 PROCEDURE — 1126F PR PAIN SEVERITY QUANTIFIED, NO PAIN PRESENT: ICD-10-PCS | Mod: S$GLB,,, | Performed by: NURSE PRACTITIONER

## 2021-04-30 PROCEDURE — 3008F PR BODY MASS INDEX (BMI) DOCUMENTED: ICD-10-PCS | Mod: CPTII,S$GLB,, | Performed by: NURSE PRACTITIONER

## 2021-05-01 LAB
ALBUMIN SERPL BCP-MCNC: 4.3 G/DL (ref 3.5–5.2)
ALP SERPL-CCNC: 55 U/L (ref 55–135)
ALT SERPL W/O P-5'-P-CCNC: 26 U/L (ref 10–44)
ANION GAP SERPL CALC-SCNC: 11 MMOL/L (ref 8–16)
AST SERPL-CCNC: 25 U/L (ref 10–40)
BILIRUB SERPL-MCNC: 0.8 MG/DL (ref 0.1–1)
BUN SERPL-MCNC: 13 MG/DL (ref 6–20)
CALCIUM SERPL-MCNC: 9.6 MG/DL (ref 8.7–10.5)
CHLORIDE SERPL-SCNC: 103 MMOL/L (ref 95–110)
CHOLEST SERPL-MCNC: 138 MG/DL (ref 120–199)
CHOLEST/HDLC SERPL: 4.2 {RATIO} (ref 2–5)
CO2 SERPL-SCNC: 24 MMOL/L (ref 23–29)
CREAT SERPL-MCNC: 1 MG/DL (ref 0.5–1.4)
EST. GFR  (AFRICAN AMERICAN): >60 ML/MIN/1.73 M^2
EST. GFR  (NON AFRICAN AMERICAN): >60 ML/MIN/1.73 M^2
GLUCOSE SERPL-MCNC: 95 MG/DL (ref 70–110)
HDLC SERPL-MCNC: 33 MG/DL (ref 40–75)
HDLC SERPL: 23.9 % (ref 20–50)
LDLC SERPL CALC-MCNC: 73.8 MG/DL (ref 63–159)
NONHDLC SERPL-MCNC: 105 MG/DL
POTASSIUM SERPL-SCNC: 4 MMOL/L (ref 3.5–5.1)
PROT SERPL-MCNC: 7.1 G/DL (ref 6–8.4)
SODIUM SERPL-SCNC: 138 MMOL/L (ref 136–145)
TRIGL SERPL-MCNC: 156 MG/DL (ref 30–150)
TSH SERPL DL<=0.005 MIU/L-ACNC: 1.14 UIU/ML (ref 0.4–4)

## 2021-05-03 ENCOUNTER — PATIENT MESSAGE (OUTPATIENT)
Dept: FAMILY MEDICINE | Facility: CLINIC | Age: 46
End: 2021-05-03

## 2021-05-05 VITALS
OXYGEN SATURATION: 96 % | SYSTOLIC BLOOD PRESSURE: 122 MMHG | DIASTOLIC BLOOD PRESSURE: 79 MMHG | HEART RATE: 75 BPM | TEMPERATURE: 98 F | RESPIRATION RATE: 12 BRPM | BODY MASS INDEX: 29.84 KG/M2 | WEIGHT: 208.44 LBS | HEIGHT: 70 IN

## 2021-05-10 ENCOUNTER — LAB VISIT (OUTPATIENT)
Dept: LAB | Facility: HOSPITAL | Age: 46
End: 2021-05-10
Attending: NURSE PRACTITIONER
Payer: COMMERCIAL

## 2021-05-10 ENCOUNTER — PATIENT MESSAGE (OUTPATIENT)
Dept: RESEARCH | Facility: HOSPITAL | Age: 46
End: 2021-05-10

## 2021-05-10 DIAGNOSIS — E29.1 MALE HYPOGONADISM: ICD-10-CM

## 2021-05-10 DIAGNOSIS — R79.89 LOW TESTOSTERONE IN MALE: ICD-10-CM

## 2021-05-10 DIAGNOSIS — N52.9 ERECTILE DYSFUNCTION, UNSPECIFIED ERECTILE DYSFUNCTION TYPE: ICD-10-CM

## 2021-05-10 LAB
COMPLEXED PSA SERPL-MCNC: 1.4 NG/ML (ref 0–4)
ERYTHROCYTE [DISTWIDTH] IN BLOOD BY AUTOMATED COUNT: 13.9 % (ref 11.5–14.5)
HCT VFR BLD AUTO: 49.1 % (ref 40–54)
HGB BLD-MCNC: 15.3 G/DL (ref 14–18)
MCH RBC QN AUTO: 26.4 PG (ref 27–31)
MCHC RBC AUTO-ENTMCNC: 31.2 G/DL (ref 32–36)
MCV RBC AUTO: 85 FL (ref 82–98)
PLATELET # BLD AUTO: 193 K/UL (ref 150–450)
PMV BLD AUTO: 9.8 FL (ref 9.2–12.9)
RBC # BLD AUTO: 5.79 M/UL (ref 4.6–6.2)
TESTOST SERPL-MCNC: 224 NG/DL (ref 304–1227)
WBC # BLD AUTO: 6.3 K/UL (ref 3.9–12.7)

## 2021-05-10 PROCEDURE — 36415 COLL VENOUS BLD VENIPUNCTURE: CPT | Performed by: NURSE PRACTITIONER

## 2021-05-10 PROCEDURE — 85027 COMPLETE CBC AUTOMATED: CPT | Performed by: NURSE PRACTITIONER

## 2021-05-10 PROCEDURE — 84153 ASSAY OF PSA TOTAL: CPT | Performed by: NURSE PRACTITIONER

## 2021-05-10 PROCEDURE — 84403 ASSAY OF TOTAL TESTOSTERONE: CPT | Performed by: NURSE PRACTITIONER

## 2021-05-11 DIAGNOSIS — E29.1 MALE HYPOGONADISM: ICD-10-CM

## 2021-05-11 DIAGNOSIS — N52.9 ERECTILE DYSFUNCTION, UNSPECIFIED ERECTILE DYSFUNCTION TYPE: Primary | ICD-10-CM

## 2021-08-12 DIAGNOSIS — E29.1 MALE HYPOGONADISM: Primary | ICD-10-CM

## 2021-08-17 ENCOUNTER — TELEPHONE (OUTPATIENT)
Dept: UROLOGY | Facility: CLINIC | Age: 46
End: 2021-08-17

## 2021-08-17 RX ORDER — TESTOSTERONE CYPIONATE 200 MG/ML
INJECTION, SOLUTION INTRAMUSCULAR
Qty: 10 ML | Refills: 2 | Status: SHIPPED | OUTPATIENT
Start: 2021-08-17 | End: 2022-04-21 | Stop reason: SDUPTHER

## 2021-08-23 ENCOUNTER — LAB VISIT (OUTPATIENT)
Dept: LAB | Facility: HOSPITAL | Age: 46
End: 2021-08-23
Attending: NURSE PRACTITIONER
Payer: COMMERCIAL

## 2021-08-23 DIAGNOSIS — E29.1 MALE HYPOGONADISM: ICD-10-CM

## 2021-08-23 LAB
ERYTHROCYTE [DISTWIDTH] IN BLOOD BY AUTOMATED COUNT: 12.7 % (ref 11.5–14.5)
HCT VFR BLD AUTO: 47.2 % (ref 40–54)
HGB BLD-MCNC: 14.9 G/DL (ref 14–18)
MCH RBC QN AUTO: 27 PG (ref 27–31)
MCHC RBC AUTO-ENTMCNC: 31.6 G/DL (ref 32–36)
MCV RBC AUTO: 86 FL (ref 82–98)
PLATELET # BLD AUTO: 191 K/UL (ref 150–450)
PMV BLD AUTO: 9 FL (ref 9.2–12.9)
RBC # BLD AUTO: 5.52 M/UL (ref 4.6–6.2)
TESTOST SERPL-MCNC: 274 NG/DL (ref 304–1227)
WBC # BLD AUTO: 6.03 K/UL (ref 3.9–12.7)

## 2021-08-23 PROCEDURE — 36415 COLL VENOUS BLD VENIPUNCTURE: CPT | Performed by: NURSE PRACTITIONER

## 2021-08-23 PROCEDURE — 85027 COMPLETE CBC AUTOMATED: CPT | Performed by: NURSE PRACTITIONER

## 2021-08-23 PROCEDURE — 84403 ASSAY OF TOTAL TESTOSTERONE: CPT | Performed by: NURSE PRACTITIONER

## 2021-10-25 ENCOUNTER — OFFICE VISIT (OUTPATIENT)
Dept: FAMILY MEDICINE | Facility: CLINIC | Age: 46
End: 2021-10-25
Payer: COMMERCIAL

## 2021-10-25 VITALS
RESPIRATION RATE: 12 BRPM | WEIGHT: 213.75 LBS | HEART RATE: 76 BPM | OXYGEN SATURATION: 97 % | TEMPERATURE: 98 F | DIASTOLIC BLOOD PRESSURE: 76 MMHG | SYSTOLIC BLOOD PRESSURE: 118 MMHG | BODY MASS INDEX: 30.67 KG/M2

## 2021-10-25 DIAGNOSIS — Z12.11 COLON CANCER SCREENING: ICD-10-CM

## 2021-10-25 DIAGNOSIS — K21.9 GASTROESOPHAGEAL REFLUX DISEASE, UNSPECIFIED WHETHER ESOPHAGITIS PRESENT: ICD-10-CM

## 2021-10-25 DIAGNOSIS — E78.1 HYPERTRIGLYCERIDEMIA: ICD-10-CM

## 2021-10-25 DIAGNOSIS — E34.9 HYPOTESTOSTERONEMIA: ICD-10-CM

## 2021-10-25 DIAGNOSIS — Z00.00 ANNUAL PHYSICAL EXAM: Primary | ICD-10-CM

## 2021-10-25 DIAGNOSIS — E03.4 HYPOTHYROIDISM DUE TO ACQUIRED ATROPHY OF THYROID: ICD-10-CM

## 2021-10-25 LAB
ALBUMIN SERPL BCP-MCNC: 4.1 G/DL (ref 3.5–5.2)
ALP SERPL-CCNC: 51 U/L (ref 55–135)
ALT SERPL W/O P-5'-P-CCNC: 32 U/L (ref 10–44)
ANION GAP SERPL CALC-SCNC: 9 MMOL/L (ref 8–16)
AST SERPL-CCNC: 26 U/L (ref 10–40)
BILIRUB SERPL-MCNC: 0.7 MG/DL (ref 0.1–1)
BUN SERPL-MCNC: 10 MG/DL (ref 6–20)
CALCIUM SERPL-MCNC: 9.4 MG/DL (ref 8.7–10.5)
CHLORIDE SERPL-SCNC: 107 MMOL/L (ref 95–110)
CHOLEST SERPL-MCNC: 138 MG/DL (ref 120–199)
CHOLEST/HDLC SERPL: 4.9 {RATIO} (ref 2–5)
CO2 SERPL-SCNC: 24 MMOL/L (ref 23–29)
CREAT SERPL-MCNC: 1 MG/DL (ref 0.5–1.4)
EST. GFR  (AFRICAN AMERICAN): >60 ML/MIN/1.73 M^2
EST. GFR  (NON AFRICAN AMERICAN): >60 ML/MIN/1.73 M^2
GLUCOSE SERPL-MCNC: 102 MG/DL (ref 70–110)
HDLC SERPL-MCNC: 28 MG/DL (ref 40–75)
HDLC SERPL: 20.3 % (ref 20–50)
LDLC SERPL CALC-MCNC: 66 MG/DL (ref 63–159)
NONHDLC SERPL-MCNC: 110 MG/DL
POTASSIUM SERPL-SCNC: 4.1 MMOL/L (ref 3.5–5.1)
PROT SERPL-MCNC: 6.8 G/DL (ref 6–8.4)
SODIUM SERPL-SCNC: 140 MMOL/L (ref 136–145)
TRIGL SERPL-MCNC: 220 MG/DL (ref 30–150)
TSH SERPL DL<=0.005 MIU/L-ACNC: 1.22 UIU/ML (ref 0.4–4)

## 2021-10-25 PROCEDURE — 36415 PR COLLECTION VENOUS BLOOD,VENIPUNCTURE: ICD-10-PCS | Mod: S$GLB,,, | Performed by: NURSE PRACTITIONER

## 2021-10-25 PROCEDURE — 3078F DIAST BP <80 MM HG: CPT | Mod: CPTII,S$GLB,, | Performed by: NURSE PRACTITIONER

## 2021-10-25 PROCEDURE — 3008F PR BODY MASS INDEX (BMI) DOCUMENTED: ICD-10-PCS | Mod: CPTII,S$GLB,, | Performed by: NURSE PRACTITIONER

## 2021-10-25 PROCEDURE — 90686 IIV4 VACC NO PRSV 0.5 ML IM: CPT | Mod: S$GLB,,, | Performed by: NURSE PRACTITIONER

## 2021-10-25 PROCEDURE — 36415 COLL VENOUS BLD VENIPUNCTURE: CPT | Mod: S$GLB,,, | Performed by: NURSE PRACTITIONER

## 2021-10-25 PROCEDURE — 3078F PR MOST RECENT DIASTOLIC BLOOD PRESSURE < 80 MM HG: ICD-10-PCS | Mod: CPTII,S$GLB,, | Performed by: NURSE PRACTITIONER

## 2021-10-25 PROCEDURE — 1159F PR MEDICATION LIST DOCUMENTED IN MEDICAL RECORD: ICD-10-PCS | Mod: CPTII,S$GLB,, | Performed by: NURSE PRACTITIONER

## 2021-10-25 PROCEDURE — 90686 FLU VACCINE (QUAD) GREATER THAN OR EQUAL TO 3YO PRESERVATIVE FREE IM: ICD-10-PCS | Mod: S$GLB,,, | Performed by: NURSE PRACTITIONER

## 2021-10-25 PROCEDURE — 84443 ASSAY THYROID STIM HORMONE: CPT | Performed by: NURSE PRACTITIONER

## 2021-10-25 PROCEDURE — 3008F BODY MASS INDEX DOCD: CPT | Mod: CPTII,S$GLB,, | Performed by: NURSE PRACTITIONER

## 2021-10-25 PROCEDURE — 80053 COMPREHEN METABOLIC PANEL: CPT | Performed by: NURSE PRACTITIONER

## 2021-10-25 PROCEDURE — 99396 PR PREVENTIVE VISIT,EST,40-64: ICD-10-PCS | Mod: 25,S$GLB,, | Performed by: NURSE PRACTITIONER

## 2021-10-25 PROCEDURE — 90471 IMMUNIZATION ADMIN: CPT | Mod: S$GLB,,, | Performed by: NURSE PRACTITIONER

## 2021-10-25 PROCEDURE — 90471 FLU VACCINE (QUAD) GREATER THAN OR EQUAL TO 3YO PRESERVATIVE FREE IM: ICD-10-PCS | Mod: S$GLB,,, | Performed by: NURSE PRACTITIONER

## 2021-10-25 PROCEDURE — 1160F PR REVIEW ALL MEDS BY PRESCRIBER/CLIN PHARMACIST DOCUMENTED: ICD-10-PCS | Mod: CPTII,S$GLB,, | Performed by: NURSE PRACTITIONER

## 2021-10-25 PROCEDURE — 99396 PREV VISIT EST AGE 40-64: CPT | Mod: 25,S$GLB,, | Performed by: NURSE PRACTITIONER

## 2021-10-25 PROCEDURE — 80061 LIPID PANEL: CPT | Performed by: NURSE PRACTITIONER

## 2021-10-25 PROCEDURE — 3074F SYST BP LT 130 MM HG: CPT | Mod: CPTII,S$GLB,, | Performed by: NURSE PRACTITIONER

## 2021-10-25 PROCEDURE — 1160F RVW MEDS BY RX/DR IN RCRD: CPT | Mod: CPTII,S$GLB,, | Performed by: NURSE PRACTITIONER

## 2021-10-25 PROCEDURE — 1159F MED LIST DOCD IN RCRD: CPT | Mod: CPTII,S$GLB,, | Performed by: NURSE PRACTITIONER

## 2021-10-25 PROCEDURE — 3074F PR MOST RECENT SYSTOLIC BLOOD PRESSURE < 130 MM HG: ICD-10-PCS | Mod: CPTII,S$GLB,, | Performed by: NURSE PRACTITIONER

## 2021-10-26 ENCOUNTER — PATIENT MESSAGE (OUTPATIENT)
Dept: FAMILY MEDICINE | Facility: CLINIC | Age: 46
End: 2021-10-26
Payer: COMMERCIAL

## 2021-10-27 ENCOUNTER — TELEPHONE (OUTPATIENT)
Dept: GASTROENTEROLOGY | Facility: CLINIC | Age: 46
End: 2021-10-27
Payer: COMMERCIAL

## 2021-12-15 ENCOUNTER — CLINICAL SUPPORT (OUTPATIENT)
Dept: URGENT CARE | Facility: CLINIC | Age: 46
End: 2021-12-15

## 2021-12-15 PROCEDURE — 99499 PR PHYSICAL - DOT/CDL: ICD-10-PCS | Mod: S$GLB,,, | Performed by: EMERGENCY MEDICINE

## 2021-12-15 PROCEDURE — 99499 UNLISTED E&M SERVICE: CPT | Mod: S$GLB,,, | Performed by: EMERGENCY MEDICINE

## 2021-12-20 ENCOUNTER — PATIENT MESSAGE (OUTPATIENT)
Dept: FAMILY MEDICINE | Facility: CLINIC | Age: 46
End: 2021-12-20
Payer: COMMERCIAL

## 2021-12-20 ENCOUNTER — TELEPHONE (OUTPATIENT)
Dept: ORTHOPEDICS | Facility: CLINIC | Age: 46
End: 2021-12-20
Payer: COMMERCIAL

## 2021-12-22 ENCOUNTER — OFFICE VISIT (OUTPATIENT)
Dept: ORTHOPEDICS | Facility: CLINIC | Age: 46
End: 2021-12-22
Payer: COMMERCIAL

## 2021-12-22 VITALS
HEIGHT: 67 IN | BODY MASS INDEX: 34.21 KG/M2 | SYSTOLIC BLOOD PRESSURE: 129 MMHG | WEIGHT: 218 LBS | HEART RATE: 73 BPM | DIASTOLIC BLOOD PRESSURE: 80 MMHG

## 2021-12-22 DIAGNOSIS — S46.211A TRAUMATIC RUPTURE OF RIGHT DISTAL BICEPS TENDON, INITIAL ENCOUNTER: Primary | ICD-10-CM

## 2021-12-22 DIAGNOSIS — S46.211A: ICD-10-CM

## 2021-12-22 PROCEDURE — 1160F PR REVIEW ALL MEDS BY PRESCRIBER/CLIN PHARMACIST DOCUMENTED: ICD-10-PCS | Mod: CPTII,S$GLB,ICN, | Performed by: ORTHOPAEDIC SURGERY

## 2021-12-22 PROCEDURE — 1160F RVW MEDS BY RX/DR IN RCRD: CPT | Mod: CPTII,S$GLB,ICN, | Performed by: ORTHOPAEDIC SURGERY

## 2021-12-22 PROCEDURE — 99204 PR OFFICE/OUTPT VISIT, NEW, LEVL IV, 45-59 MIN: ICD-10-PCS | Mod: 57,S$GLB,ICN, | Performed by: ORTHOPAEDIC SURGERY

## 2021-12-22 PROCEDURE — 1159F PR MEDICATION LIST DOCUMENTED IN MEDICAL RECORD: ICD-10-PCS | Mod: CPTII,S$GLB,ICN, | Performed by: ORTHOPAEDIC SURGERY

## 2021-12-22 PROCEDURE — 99999 PR PBB SHADOW E&M-EST. PATIENT-LVL V: ICD-10-PCS | Mod: PBBFAC,,, | Performed by: ORTHOPAEDIC SURGERY

## 2021-12-22 PROCEDURE — 3079F PR MOST RECENT DIASTOLIC BLOOD PRESSURE 80-89 MM HG: ICD-10-PCS | Mod: CPTII,S$GLB,ICN, | Performed by: ORTHOPAEDIC SURGERY

## 2021-12-22 PROCEDURE — 99204 OFFICE O/P NEW MOD 45 MIN: CPT | Mod: 57,S$GLB,ICN, | Performed by: ORTHOPAEDIC SURGERY

## 2021-12-22 PROCEDURE — 3074F SYST BP LT 130 MM HG: CPT | Mod: CPTII,S$GLB,ICN, | Performed by: ORTHOPAEDIC SURGERY

## 2021-12-22 PROCEDURE — 1159F MED LIST DOCD IN RCRD: CPT | Mod: CPTII,S$GLB,ICN, | Performed by: ORTHOPAEDIC SURGERY

## 2021-12-22 PROCEDURE — 3008F PR BODY MASS INDEX (BMI) DOCUMENTED: ICD-10-PCS | Mod: CPTII,S$GLB,ICN, | Performed by: ORTHOPAEDIC SURGERY

## 2021-12-22 PROCEDURE — 3074F PR MOST RECENT SYSTOLIC BLOOD PRESSURE < 130 MM HG: ICD-10-PCS | Mod: CPTII,S$GLB,ICN, | Performed by: ORTHOPAEDIC SURGERY

## 2021-12-22 PROCEDURE — 3008F BODY MASS INDEX DOCD: CPT | Mod: CPTII,S$GLB,ICN, | Performed by: ORTHOPAEDIC SURGERY

## 2021-12-22 PROCEDURE — 99999 PR PBB SHADOW E&M-EST. PATIENT-LVL V: CPT | Mod: PBBFAC,,, | Performed by: ORTHOPAEDIC SURGERY

## 2021-12-22 PROCEDURE — 3079F DIAST BP 80-89 MM HG: CPT | Mod: CPTII,S$GLB,ICN, | Performed by: ORTHOPAEDIC SURGERY

## 2021-12-22 RX ORDER — MUPIROCIN 20 MG/G
OINTMENT TOPICAL
Status: CANCELLED | OUTPATIENT
Start: 2021-12-22

## 2021-12-23 PROBLEM — S46.211A TRAUMATIC RUPTURE OF RIGHT DISTAL BICEPS TENDON: Status: ACTIVE | Noted: 2021-12-23

## 2021-12-24 ENCOUNTER — NURSE TRIAGE (OUTPATIENT)
Dept: ADMINISTRATIVE | Facility: CLINIC | Age: 46
End: 2021-12-24
Payer: COMMERCIAL

## 2021-12-27 ENCOUNTER — TELEPHONE (OUTPATIENT)
Dept: ORTHOPEDICS | Facility: CLINIC | Age: 46
End: 2021-12-27
Payer: COMMERCIAL

## 2021-12-27 ENCOUNTER — PATIENT MESSAGE (OUTPATIENT)
Dept: ORTHOPEDICS | Facility: CLINIC | Age: 46
End: 2021-12-27
Payer: COMMERCIAL

## 2021-12-27 DIAGNOSIS — S46.211A TRAUMATIC RUPTURE OF RIGHT DISTAL BICEPS TENDON, INITIAL ENCOUNTER: Primary | ICD-10-CM

## 2021-12-27 RX ORDER — OXYCODONE HYDROCHLORIDE 5 MG/1
5 TABLET ORAL EVERY 6 HOURS PRN
Qty: 14 TABLET | Refills: 0 | Status: SHIPPED | OUTPATIENT
Start: 2021-12-27 | End: 2022-05-02

## 2021-12-30 ENCOUNTER — TELEPHONE (OUTPATIENT)
Dept: ORTHOPEDICS | Facility: CLINIC | Age: 46
End: 2021-12-30
Payer: COMMERCIAL

## 2021-12-31 ENCOUNTER — PATIENT MESSAGE (OUTPATIENT)
Dept: ORTHOPEDICS | Facility: CLINIC | Age: 46
End: 2021-12-31
Payer: COMMERCIAL

## 2022-01-04 ENCOUNTER — PATIENT MESSAGE (OUTPATIENT)
Dept: ORTHOPEDICS | Facility: CLINIC | Age: 47
End: 2022-01-04
Payer: COMMERCIAL

## 2022-01-05 ENCOUNTER — TELEPHONE (OUTPATIENT)
Dept: GASTROENTEROLOGY | Facility: CLINIC | Age: 47
End: 2022-01-05
Payer: COMMERCIAL

## 2022-01-07 ENCOUNTER — OFFICE VISIT (OUTPATIENT)
Dept: ORTHOPEDICS | Facility: CLINIC | Age: 47
End: 2022-01-07
Payer: COMMERCIAL

## 2022-01-07 VITALS — HEIGHT: 67 IN | BODY MASS INDEX: 34.21 KG/M2 | WEIGHT: 218 LBS

## 2022-01-07 DIAGNOSIS — S46.211D TRAUMATIC RUPTURE OF RIGHT DISTAL BICEPS TENDON, SUBSEQUENT ENCOUNTER: Primary | ICD-10-CM

## 2022-01-07 PROCEDURE — 1159F MED LIST DOCD IN RCRD: CPT | Mod: CPTII,S$GLB,, | Performed by: ORTHOPAEDIC SURGERY

## 2022-01-07 PROCEDURE — 99999 PR PBB SHADOW E&M-EST. PATIENT-LVL III: CPT | Mod: PBBFAC,,, | Performed by: ORTHOPAEDIC SURGERY

## 2022-01-07 PROCEDURE — 99024 PR POST-OP FOLLOW-UP VISIT: ICD-10-PCS | Mod: S$GLB,,, | Performed by: ORTHOPAEDIC SURGERY

## 2022-01-07 PROCEDURE — 99999 PR PBB SHADOW E&M-EST. PATIENT-LVL III: ICD-10-PCS | Mod: PBBFAC,,, | Performed by: ORTHOPAEDIC SURGERY

## 2022-01-07 PROCEDURE — 1160F PR REVIEW ALL MEDS BY PRESCRIBER/CLIN PHARMACIST DOCUMENTED: ICD-10-PCS | Mod: CPTII,S$GLB,, | Performed by: ORTHOPAEDIC SURGERY

## 2022-01-07 PROCEDURE — 1160F RVW MEDS BY RX/DR IN RCRD: CPT | Mod: CPTII,S$GLB,, | Performed by: ORTHOPAEDIC SURGERY

## 2022-01-07 PROCEDURE — 3008F PR BODY MASS INDEX (BMI) DOCUMENTED: ICD-10-PCS | Mod: CPTII,S$GLB,, | Performed by: ORTHOPAEDIC SURGERY

## 2022-01-07 PROCEDURE — 3008F BODY MASS INDEX DOCD: CPT | Mod: CPTII,S$GLB,, | Performed by: ORTHOPAEDIC SURGERY

## 2022-01-07 PROCEDURE — 1159F PR MEDICATION LIST DOCUMENTED IN MEDICAL RECORD: ICD-10-PCS | Mod: CPTII,S$GLB,, | Performed by: ORTHOPAEDIC SURGERY

## 2022-01-07 PROCEDURE — 99024 POSTOP FOLLOW-UP VISIT: CPT | Mod: S$GLB,,, | Performed by: ORTHOPAEDIC SURGERY

## 2022-01-07 NOTE — PROGRESS NOTES
Mr Lorenzo returns to clinic today.  He is 2 weeks status post right distal biceps tendon repair.  He is overall doing well.  States that his pain is much better controlled.    Physical exam:  Examination the right upper extremity reveals that the incision is healing well.  There is minimal edema.  There is no erythema or drainage.  Biceps tendon appears to be intact.  He has a 2+ radial pulse.  He does have sensation intact overlying the entire wrist and hand but does have mild numbness over the lateral forearm.  Does have 5/5 strength of wrist and finger extension including thumb extension    Assessment:  Status post right distal biceps tendon repair    Plan:    1. Sutures were removed today and Steri-Strips are placed    2. Was placed into a hinged elbow brace    3. He will be set up with occupational therapy to begin the post distal biceps tendon repair protocol

## 2022-01-11 ENCOUNTER — CLINICAL SUPPORT (OUTPATIENT)
Dept: REHABILITATION | Facility: HOSPITAL | Age: 47
End: 2022-01-11
Attending: ORTHOPAEDIC SURGERY
Payer: COMMERCIAL

## 2022-01-11 DIAGNOSIS — M25.621 STIFFNESS OF ELBOW JOINT, RIGHT: ICD-10-CM

## 2022-01-11 DIAGNOSIS — Z78.9 IMPAIRED MOBILITY AND ADLS: ICD-10-CM

## 2022-01-11 DIAGNOSIS — Z74.09 IMPAIRED MOBILITY AND ADLS: ICD-10-CM

## 2022-01-11 DIAGNOSIS — S46.211D TRAUMATIC RUPTURE OF RIGHT DISTAL BICEPS TENDON, SUBSEQUENT ENCOUNTER: ICD-10-CM

## 2022-01-11 DIAGNOSIS — M79.601 PAIN, ARM, RIGHT: ICD-10-CM

## 2022-01-11 DIAGNOSIS — M62.81 MUSCLE WEAKNESS OF RIGHT ARM: ICD-10-CM

## 2022-01-11 PROCEDURE — 97110 THERAPEUTIC EXERCISES: CPT | Mod: PO

## 2022-01-11 PROCEDURE — 97166 OT EVAL MOD COMPLEX 45 MIN: CPT | Mod: PO

## 2022-01-11 NOTE — PLAN OF CARE
[facility] Occupational Therapy  Initial Evaluation     Date: 1/11/2022  Patient: Khadar Lorenzo  Chart Number: [id number]    Treatment Diagnosis:   1. Traumatic rupture of right distal biceps tendon, subsequent encounter  Ambulatory referral/consult to Physical/Occupational Therapy   2. Pain, arm, right     3. Muscle weakness of right arm     4. Stiffness of elbow joint, right     5. Impaired mobility and ADLs         Physician: Bhavesh Steen MD    Physician Orders:   Note    Therapy to the right upper extremity.  Status post distal biceps tendon repair protocol.  Nonweightbearing.  Begin range of motion of full flexion to 60° from full extension.  Increase extension by 10° per week until full extension is reached.     Frequency:  3 times per week     Duration:  6 weeks     Diagnosis:  Status post right distal biceps tendon repair          Medical Diagnosis: S46.211D (ICD-10-CM) - Traumatic rupture of right distal biceps tendon, subsequent encounter    Evaluation Date: 1/11/2022  Insurance Authorization period Expiration:  Calendar year  Plan of Care Expiration Period: 6 weeks = 2/21/2022  Next Re-assessment: 30 days (2/10/2022) and/or 10th visit  Date of Return Referring Provider 2/07/2022    Visit # / Visits Authortized: 1 / TBD  # No shows 0 / # Cancellations: 0  Time In:0800  Time Out: 0845  Total Billable Time: 45 minutes    Precautions: Standard and per protocol  Surgery: DATE OF SURGERY: 12/23/2021      PRE-OPERATIVE DIAGNOSIS:  Right distal biceps tendon rupture     POST-OPERATIVE DIAGNOSIS:  Right distal biceps tendon rupture      ANESTHESIA TYPE:  General with a single-shot supraclavicular block     BLOOD LOSS:  Less than 10 cc     TOURNIQUET TIME:  None     SURGEON: Dr Steen     ASSISTANT: Dr Darion Gilmore Assistant surgeon was present for the entire case and did assist with advancement of the biceps tendon as well as fixation of the biceps tendon to the radius as  well as wound closure.     PROCEDURE:  Right distal biceps tendon repair     IMPLANTS:  Ridge distal biceps tendon button x1, Ridge bio interference screw size 7 mm x 1         S/P: 2 weeks and 6 days on 1/11/2022    Subjective     Involved Side: Right  Dominant Side: Right  Date of Onset: 12/19/2021    Mechanism of Injury/ History of Current Condition: Pt was playing football with his son and reached out to catch him and felt a pop in his R arm.    Other Surgical/PMHX involved UE:  None reported    Imaging: X rays:   - fx    Previous Therapy: None reported    Patient's Goals for Therapy:  To get RUE back to normal.    Pain:  Functional Pain Scale Rating 0-10:   3/10 on average  3/10 at best  6-7/10 at worst  Location: R elbow/wrist   Description: Aching, Dull, Burning, Throbbing and Variable,   Aggravating Factors: pressure/touvh wrist/scar and posterior elbow.  Easing Factors: rest    Occupation:    Title: Employed by [employer]  Former work status: full  Current work status: not currently working  Physical demand: medium      Functional Limitations/Social History:    Previous functional status includes: Independent with all ADLs/IADLs.    Current FunctionalStatus   Home/Living environment : Pt lives at home.    Limitation of Functional Status as follows:   ADLs/IADLs: Moderate overall difficulty reported with basic ADL/IADL tasks.               See Quickdash results below for further related to UE function.    Past Medical History/Physical Systems Review:   Khadar Lorenzo  has a past medical history of Anxiety, Depression, GERD (gastroesophageal reflux disease), Hyperlipidemia, Hypotestosteronemia, Hypothyroidism, Nephrolithiasis, Obesity, Snoring, and Syncope and collapse.    Khadar Lorenzo  has a past surgical history that includes TONSILLECTOMY, ADENOIDECTOMY, BILATERAL yringotomy and tubes; Fracture surgery; Cyst Removal; and testopel.    Khadar has a current medication list which  includes the following prescription(s): cyclobenzaprine, fenofibrate, levothyroxine, olux-e, omeprazole, ondansetron, oxycodone, tadalafil, tadalafil, and testosterone cypionate.    Review of patient's allergies indicates:  No Known Allergies       Objective       CMS Impairment/Limitation/Restriction for Quick DASH Survey    Therapist reviewed Quick DASH scores on date of Eval  Quick DASH documents entered into [employer] - see Media section for original.    The Quick DASH Questionnaire- The following scores are based on patient reported assessment at the time Eval for occupational therapy.    Activity:  1. Open a tight jar: 2 Difficulty  2. Do heavy household chores: 2 Difficulty  3. Carry a shopping bag or briefcase: 3 Difficulty  4. Wash your back: 3 Difficulty  5.Use a knife to cut food: 2 Difficulty  6.. Recreational activities requiring force through arm: 3 Difficulty  7. Social Limitation: 4 Limited  8. Work/ADL Limitation: 4 Limited  Severity of Symptoms (over the past week)  9. Pain: 3  10. Tingling: 3  11. Sleeping Limitation: 2 Difficulty    Limitation Score: 45.45%    Scale  1= NO (Difficulty or Limitatons)  2= Mild (Difficulty/Limitations)  3= Moderate (Difficulty/Limitations)  4= Severe (Difficulty/Limitations)  5= Extreme/Unable and/or can't sleep (Difficulty/Limitations)         Observation/Inspection/Wound/Incision/Scar  Incision, closed/healed with only mild overall thickening     Sensation: Grossly WNL     Edema:     Moderate:  non pitting RUE.          Circumferential (in cm) L R   Proximal Wrist Crease 17.7 19.0   MPs NT NT   Long Proximal Phalanx NT NT      AROM Hand: Tip to palm/DPC digits 1-5 (in cm) WNL     AROM Wrist/Forearm: Grossly WNL wrist flex and ext   Sup/Pro NT     PROM Elbow flexion in brace:  Full flexion to stop of brace (110 degrees) and able to hit stop of -60 degrees with ext.     AROM            Left              Right   Elbow Ext/Flex  TBD° Ext/Flex:  TBD         Manual Muscle  Test: NT                                       and Pinch Strength: NTat this time due to post operative status.        Special and/or Standardized Tests: NT        Treatment     Treatment Time In 0830  Treatment Time Out: 0845  Total Treatment time separate from Evaluation time:15 min    Pk received therapeutic exercises for 12 minutes including: Initial Home Exercise Program Instruction.  Gentle PROM elbow flexion and gentle active ext in brace. 2x10 every 1-2 hours daytime  Tendon Gliding Exercises: Full sequence, Thumb slides down small finger and wrist flex/ext and ulnar and radial deviation also with handout and return demo for 2x10 recommended 3-4 x day.    Manual therapy: scar massage 3 min and instructed on Home program 1-2 x day      Home Exercise Program/Education:  Issued HEP (see patient instructions in EMR) and educated on modality use for pain management . Exercises were reviewed and Pk was able to demonstrate them prior to the end of the session.   Pt received a written copy of exercises to perform at home. Pk demonstrated good  understanding of the education provided.  Pt was advised to perform these exercises free of pain, and to stop performing them if pain occurs.    Patient/Family Education: role of OT, goals for OT, scheduling/cancellations - pt verbalized understanding. Discussed insurance limitations with patient.    Additional Education provided: Elevate for edema control/ appropriate use of brace unlock for ex and when resting.    Assessment     Khadar Josespeth is a 46 y.o. male referred to outpatient occupational/hand therapy and presents with a medical diagnosis of R distal biceps rupture, S/P repair resulting in, pain, edema, decreased A/PROM, strength, and functional use of involved upper extremity/extremities) and demonstrates limitations as described in the chart below.     The patient's rehab potential is good for the goals listed below.    No anticipated barriers to  occupational therapy.  Pt has no cultural, educational or language barriers to learning provided.    Profile and History Assessment of Occupational Performance Level of Clinical Decision Making Complexity Score   Occupational Profile:   Khadar Lorenzo is a 46 y.o. male who was Independent with all ADL/IADL prior to onset of symptoms/injury . Pt is currently  reporting Mod difficulty with RUE use affecting his daily functional abilities. His main goal for therapy is regain Independent use of RUE.    Comorbidities:    has a past medical history of Anxiety, Depression, GERD (gastroesophageal reflux disease), Hyperlipidemia, Hypotestosteronemia, Hypothyroidism, Nephrolithiasis, Obesity, Snoring, and Syncope and collapse.  Medical and Therapy History Review:   Expanded               Performance Deficits    Physical:  Joint Mobility  Muscle Power/Strength  Muscle Endurance  Skin Integrity/Scar Formation  Edema   Strength  Tactile Functions  Pain    Cognitive:  No Deficits    Psychosocial:    No Deficits     Clinical Decision Making:  moderate    Assessment Process:  Detailed Assessments    Modification/Need for Assistance:  Minimal-Moderate Modifications/Assistance    Intervention Selection:  Several Treatment Options       moderate  Based on PMHX, co morbidities , data from assessments and functional level of assistance required with task and clinical presentation directly impacting function.       The following goals were discussed with the patient and patient is in agreement with them as to be addressed in the treatment plan.     Goals:     Short Term Goals: (30 days, 2/10/2022, and/or 10th visit) unless otherwise noted below.  1. Pt will be independent with HEP in 2 visits.  2. Pt will report decreased pain to a 2/10 at worst in RUE with ADLs in order to increase function/use of UE.   3. Pt will increase AROM R elbow ext to to at least -30 degrees in prep for return to RUE use for ADL..     Long Term  Goals: (by discharge)  1. Pt will report decreased pain to 1-2/10 with ADLs to allow for increased function/use of UE.   2. Pt will exhibit WNL AROM of  RUE to allow for Independent use of for all ADL/IADL tasks.  3. Pt will exhibit  WFL strength RUE to allow a firm grasp during ADL/IADL (cooking utensils, tool use, carrying groceries, steering wheel, etc.)  4. Pt will return to [facility] with all ADL/IADL, leisure and job tasks.    Plan   Plan of care expiration:    Outpatient Occupational Therapy 3 times weekly through current poc expiration date of 2/21/2022, to include the following interventions:     Moist heat, cold packs, paraffin, fluidotherapy, edema control, scar mobilization/scar massage, manual therapy/joint mobilizations,A/PROM, therapeutic exercises/activities, strengthening, desensitization/sensory re-education, orthotic fitting/fabrication/training  PRN, joint protections/energry conservation/adaptive equipment/activity modification HEP/education as well as any other treatments deemed necessary based on the patient's needs or progress.     Pt may be discharged prior to poc expiration date if all goals/desired outcome met or if max rehabilitation potential has been achieved.    Updates Next Visit: To review HEP understanding and compliance and progress with OT tx as tolerated.    HILLARY Turner, JERADT

## 2022-01-12 NOTE — PROGRESS NOTES
Occupational Therapy Daily Treatment Note     Name: Khadar Jamison [facility]  Clinic Number: [id number]    Therapy Diagnosis:   Encounter Diagnoses   Name Primary?    Pain, arm, right Yes    Muscle weakness of right arm     Stiffness of elbow joint, right     Impaired mobility and ADLs      Physician: Bhavesh Steen MD    Physician orders:  Note     Therapy to the right upper extremity.  Status post distal biceps tendon repair protocol.  Nonweightbearing.  Begin range of motion of full flexion to 60° from full extension.  Increase extension by 10° per week until full extension is reached.     Frequency:  3 times per week     Duration:  6 weeks     Diagnosis:  Status post right distal biceps tendon repair          Visit Date: 1/14/2022    Medical Diagnosis: S46.211D (ICD-10-CM) - Traumatic rupture of right distal biceps tendon, subsequent encounter     Evaluation Date: 1/11/2022  Insurance Authorization period Expiration:  Calendar year  Plan of Care Expiration Period: 6 weeks = 2/21/2022  Next Re-assessment: 30 days (2/10/2022) and/or 10th visit  Date of Return Referring Provider 2/07/2022     Visit # / Visits Authortized: 2 / TBD  # No shows 0 / # Cancellations: 0  Time In: 0750  Time Out: 0820  Total Billable Time: 28 minutes     Precautions: Standard and per protocol  Surgery: DATE OF SURGERY: 12/23/2021      PRE-OPERATIVE DIAGNOSIS:  Right distal biceps tendon rupture     POST-OPERATIVE DIAGNOSIS:  Right distal biceps tendon rupture      ANESTHESIA TYPE:  General with a single-shot supraclavicular block     BLOOD LOSS:  Less than 10 cc     TOURNIQUET TIME:  None     SURGEON: Dr Steen     ASSISTANT: Dr Darion Gilmore Assistant surgeon was present for the entire case and did assist with advancement of the biceps tendon as well as fixation of the biceps tendon to the radius as well as wound closure.     PROCEDURE:  Right distal biceps tendon repair     IMPLANTS:  Ridge distal biceps tendon button x1,  Ridge bio interference screw size 7 mm x 1                                         S/P: 3 weeks and 1 day on 1/14/2022    Subjective     Pt reports: No new symptoms or complaints  he was compliant with HEP.   Response to previous treatment: Very good  Functional change: None reported today.    Functional Pain Scale Rating 0-10:   3/10 on average  3/10 at best  6-7/10 at worst  Location: R shoulder  Description: Aching, Dull, Burning, Throbbing and Variable,   Aggravating Factors: pressure/touvh wrist/scar and posterior elbow.  Easing Factors: rest    Objective   Quickdash from Eval = 45.45%    Last measurements from Eval unless otherwise noted below.    MEASUREMENTS  Observation/Inspection/Wound/Incision/Scar  Incision, closed/healed with only mild overall thickening     Sensation: Grossly WNL     Edema:     Moderate:  non pitting RUE.          Circumferential (in cm) L R   Proximal Wrist Crease 17.7 19.0   MPs NT NT   Long Proximal Phalanx NT NT      AROM Hand: Tip to palm/DPC digits 1-5 (in cm) WNL     AROM Wrist/Forearm: Grossly WNL wrist flex and ext   Sup/Pro NT     PROM Elbow in brace: 1/14/2022  Able to hit stop of -50 degrees with ext.     PROM 1/14/2022            Left              Right   Elbow    Forearm Supination Ext/Flex  135/0°      85 Ext/Flex:  NT/120      65         Manual Muscle Test: NT                                       and Pinch Strength: NTat this time due to post operative status.        Special and/or Standardized Tests: NT       TREATMENT  Pk received the following supervised modalities after being cleared for contradictions for 0 minutes:   -NT    Pk received the following direct contact modalities after being cleared for contraindications for 0 minutes:  -NT    Pk received the following manual therapy techniques for 8 minutes:   -Retrograde and scar massage to RUE    Pk received therapeutic exercises for 20 minutes including:  - PROM flexion 10x20 sec out of brace  - PROM  supination 10x20 sec out of brace  - PROM flexion and gentle AROM ext to stop of brace (-50 degrees)   - B shoulder retraction x10    Pk participated in dynamic functional therapeutic activities to improve functional performance for 0  minutes, including:  -NT    Home Exercises and Education Provided     Education provided:   - Cont per previous.    Written Home Exercises Provided:  Patient instructed to cont prior HEP.    Exercises were reviewed and Pk was able to demonstrate them prior to the end of the session.  Pk demonstrated good  understanding of the education provided.     See EMR under Media for exercises provided today and/or prior visit.        Assessment     Pt would continue to benefit from skilled OT. Pt with very good overall PROM with elbow flexion and supination. Pt was able to easily reach new stop of -50 degrees today post adj. Cont per current poc.     - Progress towards goals:  STG #1 has been met.    Pk is progressing well towards his goals . Pt continues with good rehab potential.     Pt will continue to benefit from skilled outpatient occupational therapy to address the deficits listed in the problem list on initial evaluation in order to maximize pt's level of independence in the home and community.     Anticipated barriers to occupational therapy: None    Pt's spiritual, cultural and educational needs considered and pt agreeable to plan of care and goals.    Goals:  Short Term Goals: (30 days, 2/10/2022, and/or 10th visit) unless otherwise noted below.  1. Pt will be independent with HEP in 2 visits.  2. Pt will report decreased pain to a 2/10 at worst in RUE with ADLs in order to increase function/use of UE.   3. Pt will increase AROM R elbow ext to to at least -30 degrees in prep for return to RUE use for ADL..     Long Term Goals: (by discharge)  1. Pt will report decreased pain to 1-2/10 with ADLs to allow for increased function/use of UE.   2. Pt will exhibit WNL AROM of  RUE  to allow for Independent use of for all ADL/IADL tasks.  3. Pt will exhibit  WFL strength RUE to allow a firm grasp during ADL/IADL (cooking utensils, tool use, carrying groceries, steering wheel, etc.)  4. Pt will return to [facility] with all ADL/IADL, leisure and job tasks    Plan   Continue Occupational Therapy 3 times per week through current poc expiration date of 2/21/2022, in order to to decrease pain and edema, and increase A/PROM, strength, and functional use of R upper extremity.    Updates/Grading for next session:  Progress with OT as tolerated.      HILLARY Turner, JERADT

## 2022-01-14 ENCOUNTER — CLINICAL SUPPORT (OUTPATIENT)
Dept: REHABILITATION | Facility: HOSPITAL | Age: 47
End: 2022-01-14
Attending: ORTHOPAEDIC SURGERY
Payer: COMMERCIAL

## 2022-01-14 DIAGNOSIS — M25.621 STIFFNESS OF ELBOW JOINT, RIGHT: ICD-10-CM

## 2022-01-14 DIAGNOSIS — Z74.09 IMPAIRED MOBILITY AND ADLS: ICD-10-CM

## 2022-01-14 DIAGNOSIS — Z78.9 IMPAIRED MOBILITY AND ADLS: ICD-10-CM

## 2022-01-14 DIAGNOSIS — M79.601 PAIN, ARM, RIGHT: Primary | ICD-10-CM

## 2022-01-14 DIAGNOSIS — M62.81 MUSCLE WEAKNESS OF RIGHT ARM: ICD-10-CM

## 2022-01-14 PROCEDURE — 97140 MANUAL THERAPY 1/> REGIONS: CPT | Mod: PO

## 2022-01-14 PROCEDURE — 97110 THERAPEUTIC EXERCISES: CPT | Mod: PO

## 2022-01-14 NOTE — PROGRESS NOTES
Occupational Therapy Daily Treatment Note     Name: Khadar Jamison [facility]  Clinic Number: [id number]    Therapy Diagnosis:   Encounter Diagnoses   Name Primary?    Pain, arm, right Yes    Muscle weakness of right arm     Stiffness of elbow joint, right     Impaired mobility and ADLs      Physician: Bhavesh Steen MD    Physician orders:  Note     Therapy to the right upper extremity.  Status post distal biceps tendon repair protocol.  Nonweightbearing.  Begin range of motion of full flexion to 60° from full extension.  Increase extension by 10° per week until full extension is reached.     Frequency:  3 times per week     Duration:  6 weeks     Diagnosis:  Status post right distal biceps tendon repair          Visit Date: 1/18/2022    Medical Diagnosis: S46.211D (ICD-10-CM) - Traumatic rupture of right distal biceps tendon, subsequent encounter     Evaluation Date: 1/11/2022  Insurance Authorization period Expiration:  Calendar year  Plan of Care Expiration Period: 6 weeks = 2/21/2022  Next Re-assessment: 30 days (2/10/2022) and/or 10th visit  Date of Return Referring Provider 2/07/2022     Visit # / Visits Authortized:  3 / TBD  # No shows 0 / # Cancellations: 0  Time In: 0804  Time Out: 0829Total Billable Time:25 minutes     Precautions: Standard and per protocol  Surgery: DATE OF SURGERY: 12/23/2021      PRE-OPERATIVE DIAGNOSIS:  Right distal biceps tendon rupture     POST-OPERATIVE DIAGNOSIS:  Right distal biceps tendon rupture      ANESTHESIA TYPE:  General with a single-shot supraclavicular block     BLOOD LOSS:  Less than 10 cc     TOURNIQUET TIME:  None     SURGEON: Dr Steen     ASSISTANT: Dr Darion Gilmore Assistant surgeon was present for the entire case and did assist with advancement of the biceps tendon as well as fixation of the biceps tendon to the radius as well as wound closure.     PROCEDURE:  Right distal biceps tendon repair     IMPLANTS:  Ridge distal biceps tendon button x1,  Ridge bio interference screw size 7 mm x 1                                         S/P: 3 weeks and 5 days on 1/18/2022    Subjective     Pt reports: No new symptoms or complaints  he was compliant with HEP.   Response to previous treatment: Very good  Functional change: None reported today.    Functional Pain Scale Rating 0-10:   0/10 on average  0/10 at best  1-2/10 at worst  Location: R shoulder  Description: Aching, Dull, Burning, Throbbing and Variable,   Aggravating Factors: pressure/touvh wrist/scar and posterior elbow.  Easing Factors: rest    Objective   Quickdash from Eval = 45.45%    Last measurements from Eval unless otherwise noted below.    MEASUREMENTS  Observation/Inspection/Wound/Incision/Scar  Incision, closed/healed with only mild overall thickening     Sensation: Grossly WNL     Edema:     Moderate:  non pitting RUE.   1/18/2022       Circumferential (in cm) L R   Proximal Wrist Crease 17.7 18.3   MPs NT NT   Long Proximal Phalanx NT NT      AROM Hand: Tip to palm/DPC digits 1-5 (in cm) WNL     AROM Wrist/Forearm: Grossly WNL wrist flex and ext   Sup/Pro NT     PROM Elbow in brace: 1/14/2022  Able to hit stop of -50 degrees with ext.     PROM 1/18/2022            Left              Right   Elbow    Forearm Supination Ext/Flex  135/0°      85 Ext/Flex:  NT/120      70         Manual Muscle Test: NT                                       and Pinch Strength: NTat this time due to post operative status.        Special and/or Standardized Tests: NT       TREATMENT  Pk received the following supervised modalities after being cleared for contradictions for 0 minutes:   -NT    Pk received the following direct contact modalities after being cleared for contraindications for 0 minutes:  -NT    Pk received the following manual therapy techniques for 8 minutes:   -Retrograde and scar massage to RUE    Pk received therapeutic exercises for 17 minutes including:  - PROM flexion 10x20 sec out of  brace  - PROM supination 10x20 sec out of brace  - PROM flexion and gentle AROM ext to stop of brace (-50 degrees)   - B shoulder retraction x10    Pk participated in dynamic functional therapeutic activities to improve functional performance for 0  minutes, including:  -NT    Home Exercises and Education Provided     Education provided:   - Cont per previous.    Written Home Exercises Provided:  Patient instructed to cont prior HEP.    Exercises were reviewed and Pk was able to demonstrate them prior to the end of the session.  Pk demonstrated good  understanding of the education provided.     See EMR under Media for exercises provided today and/or prior visit.        Assessment     Pt would continue to benefit from skilled OT. Pt with some improved supination and much decreased pain. Cont per current poc.     - Progress towards goals:  STG #1 has been met.    Pk is progressing well towards his goals . Pt continues with good rehab potential.     Pt will continue to benefit from skilled outpatient occupational therapy to address the deficits listed in the problem list on initial evaluation in order to maximize pt's level of independence in the home and community.     Anticipated barriers to occupational therapy: None    Pt's spiritual, cultural and educational needs considered and pt agreeable to plan of care and goals.    Goals:  Short Term Goals: (30 days, 2/10/2022, and/or 10th visit) unless otherwise noted below.  1. Pt will be independent with HEP in 2 visits.  2. Pt will report decreased pain to a 2/10 at worst in RUE with ADLs in order to increase function/use of UE.   3. Pt will increase AROM R elbow ext to to at least -30 degrees in prep for return to RUE use for ADL..     Long Term Goals: (by discharge)  1. Pt will report decreased pain to 1-2/10 with ADLs to allow for increased function/use of UE.   2. Pt will exhibit WNL AROM of  RUE to allow for Independent use of for all ADL/IADL tasks.  3. Pt  will exhibit  WFL strength RUE to allow a firm grasp during ADL/IADL (cooking utensils, tool use, carrying groceries, steering wheel, etc.)  4. Pt will return to [facility] with all ADL/IADL, leisure and job tasks    Plan   Continue Occupational Therapy 3 times per week through current poc expiration date of 2/21/2022, in order to to decrease pain and edema, and increase A/PROM, strength, and functional use of R upper extremity.    Updates/Grading for next session:  Progress with OT as tolerated.      HILLARY Turner, JERADT

## 2022-01-18 ENCOUNTER — CLINICAL SUPPORT (OUTPATIENT)
Dept: REHABILITATION | Facility: HOSPITAL | Age: 47
End: 2022-01-18
Attending: ORTHOPAEDIC SURGERY
Payer: COMMERCIAL

## 2022-01-18 ENCOUNTER — TELEPHONE (OUTPATIENT)
Dept: GASTROENTEROLOGY | Facility: CLINIC | Age: 47
End: 2022-01-18
Payer: COMMERCIAL

## 2022-01-18 DIAGNOSIS — Z78.9 IMPAIRED MOBILITY AND ADLS: ICD-10-CM

## 2022-01-18 DIAGNOSIS — Z74.09 IMPAIRED MOBILITY AND ADLS: ICD-10-CM

## 2022-01-18 DIAGNOSIS — M79.601 PAIN, ARM, RIGHT: Primary | ICD-10-CM

## 2022-01-18 DIAGNOSIS — M62.81 MUSCLE WEAKNESS OF RIGHT ARM: ICD-10-CM

## 2022-01-18 DIAGNOSIS — M25.621 STIFFNESS OF ELBOW JOINT, RIGHT: ICD-10-CM

## 2022-01-18 PROCEDURE — 97140 MANUAL THERAPY 1/> REGIONS: CPT | Mod: PO

## 2022-01-18 PROCEDURE — 97110 THERAPEUTIC EXERCISES: CPT | Mod: PO

## 2022-01-18 NOTE — TELEPHONE ENCOUNTER
Several attempts have been made to contact patient with no response. Case request canceled at this time. PCP notified. Phone number provided for call back. Pt will be scheduled from this order upon call back.

## 2022-01-19 ENCOUNTER — CLINICAL SUPPORT (OUTPATIENT)
Dept: REHABILITATION | Facility: HOSPITAL | Age: 47
End: 2022-01-19
Attending: ORTHOPAEDIC SURGERY
Payer: COMMERCIAL

## 2022-01-19 DIAGNOSIS — Z78.9 IMPAIRED MOBILITY AND ADLS: ICD-10-CM

## 2022-01-19 DIAGNOSIS — M79.601 PAIN, ARM, RIGHT: ICD-10-CM

## 2022-01-19 DIAGNOSIS — M62.81 MUSCLE WEAKNESS OF RIGHT ARM: ICD-10-CM

## 2022-01-19 DIAGNOSIS — M25.621 STIFFNESS OF ELBOW JOINT, RIGHT: ICD-10-CM

## 2022-01-19 DIAGNOSIS — Z74.09 IMPAIRED MOBILITY AND ADLS: ICD-10-CM

## 2022-01-19 PROCEDURE — 97110 THERAPEUTIC EXERCISES: CPT | Mod: PO

## 2022-01-19 NOTE — PROGRESS NOTES
Occupational Therapy Daily Treatment Note     Name: Khadar Jamison [facility]  Clinic Number: [id number]    Therapy Diagnosis:   Encounter Diagnoses   Name Primary?    Pain, arm, right     Muscle weakness of right arm     Stiffness of elbow joint, right     Impaired mobility and ADLs      Physician: Bhavesh Steen MD    Physician orders:  Note     Therapy to the right upper extremity.  Status post distal biceps tendon repair protocol.  Nonweightbearing.  Begin range of motion of full flexion to 60° from full extension.  Increase extension by 10° per week until full extension is reached.     Frequency:  3 times per week     Duration:  6 weeks     Diagnosis:  Status post right distal biceps tendon repair          Visit Date: 1/19/2022    Medical Diagnosis: S46.211D (ICD-10-CM) - Traumatic rupture of right distal biceps tendon, subsequent encounter     Evaluation Date: 1/11/2022  Insurance Authorization period Expiration:  Calendar year  Plan of Care Expiration Period: 6 weeks = 2/21/2022  Next Re-assessment: 30 days (2/10/2022) and/or 10th visit  Date of Return Referring Provider 2/07/2022     Visit # / Visits Authortized:  4 / TBD  # No shows 0 / # Cancellations: 0  Time In: 9:00 am  Time Out: 9:36 am  Total Billable Time:36 minutes     Precautions: Standard and per protocol  Surgery: DATE OF SURGERY: 12/23/2021      PRE-OPERATIVE DIAGNOSIS:  Right distal biceps tendon rupture     POST-OPERATIVE DIAGNOSIS:  Right distal biceps tendon rupture      ANESTHESIA TYPE:  General with a single-shot supraclavicular block     BLOOD LOSS:  Less than 10 cc     TOURNIQUET TIME:  None     SURGEON: Dr Steen     ASSISTANT: Dr Darion Gilmore Assistant surgeon was present for the entire case and did assist with advancement of the biceps tendon as well as fixation of the biceps tendon to the radius as well as wound closure.     PROCEDURE:  Right distal biceps tendon repair     IMPLANTS:  Ridge distal biceps tendon button  "x1, Ridge bio interference screw size 7 mm x 1                                         S/P: 3 weeks and 5 days on 1/18/2022    Subjective     Pt reports: "My arm feels good. The brace is holding me back."   he was compliant with HEP.   Response to previous treatment: Very good  Functional change: None allowed R UE    Functional Pain Scale Rating 0-10:   0/10 on average  0/10 at best  1-2/10 at worst  Location: R shoulder  Description: Aching, Dull, Burning, Throbbing and Variable,   Aggravating Factors: pressure/touvh wrist/scar and posterior elbow.  Easing Factors: rest    Objective   Quickdash from Eval = 45.45%    Last measurements from Eval unless otherwise noted below.    MEASUREMENTS  Observation/Inspection/Wound/Incision/Scar  Incision, closed/healed with only mild overall thickening     Sensation: Grossly WNL     Edema:     Moderate:  non pitting RUE.   1/18/2022       Circumferential (in cm) L R   Proximal Wrist Crease 17.7 18.3   MPs NT NT   Long Proximal Phalanx NT NT      AROM Hand: Tip to palm/DPC digits 1-5 (in cm) WNL     AROM Wrist/Forearm: Grossly WNL wrist flex and ext   Sup/Pro NT     PROM Elbow in brace: 1/14/2022  Able to hit stop of -50 degrees with ext.     PROM 1/18/2022            Left              Right   Elbow    Forearm Supination Ext/Flex  135/0°      85 Ext/Flex:  NT/120      70         Manual Muscle Test: NT                                       and Pinch Strength: NTat this time due to post operative status.        Special and/or Standardized Tests: NT       TREATMENT  Pk received the following supervised modalities after being cleared for contradictions for 0 minutes:   -NT    Pk received the following direct contact modalities after being cleared for contraindications for 0 minutes:  -NT    Pk received the following manual therapy techniques for 10 minutes:   -retrograde massage and scar massage to ABBIE Whittington received therapeutic exercises for 26 minutes including:  - " PROM flexion 10x20 sec out of brace  - PROM supination 10x20 sec out of brace  - PROM flexion and gentle AROM ext to stop of brace (-50 degrees)   - B shoulder retraction x10  -Reviewed precautions and post op restrictions with patient     Pk participated in dynamic functional therapeutic activities to improve functional performance for 0  minutes, including:  -NT    Home Exercises and Education Provided     Education provided:   - Cont per previous.    Written Home Exercises Provided:  Patient instructed to cont prior HEP.    Exercises were reviewed and Pk was able to demonstrate them prior to the end of the session.  Pk demonstrated good  understanding of the education provided.     See EMR under Media for exercises provided today and/or prior visit.        Assessment     Pt would continue to benefit from skilled OT. Reviewed with patient proper wear of brace, locked except for exercise every 2 hours. Pt reports he has been leaving it unlocked and bending his elbow actively within the limits on occasion. Pt states he has had no pain. Educated patient on tendon healing and precautions as well as lifting restrictions for RTW planning. Pt to confirm with MD re: RTW time frames based on lifting requirements and job description. Pt will progress extension on brace to -40 degrees on 1/21/22 and will resume sessions next week due to primary therapist out.     - Progress towards goals:  STG #1 has been met.    Pk is progressing well towards his goals . Pt continues with good rehab potential.     Pt will continue to benefit from skilled outpatient occupational therapy to address the deficits listed in the problem list on initial evaluation in order to maximize pt's level of independence in the home and community.     Anticipated barriers to occupational therapy: None    Pt's spiritual, cultural and educational needs considered and pt agreeable to plan of care and goals.    Goals:  Short Term Goals: (30 days,  2/10/2022, and/or 10th visit) unless otherwise noted below.  1. Pt will be independent with HEP in 2 visits.  2. Pt will report decreased pain to a 2/10 at worst in RUE with ADLs in order to increase function/use of UE.   3. Pt will increase AROM R elbow ext to to at least -30 degrees in prep for return to RUE use for ADL..     Long Term Goals: (by discharge)  1. Pt will report decreased pain to 1-2/10 with ADLs to allow for increased function/use of UE.   2. Pt will exhibit WNL AROM of  RUE to allow for Independent use of for all ADL/IADL tasks.  3. Pt will exhibit  WFL strength RUE to allow a firm grasp during ADL/IADL (cooking utensils, tool use, carrying groceries, steering wheel, etc.)  4. Pt will return to PLOF with all ADL/IADL, leisure and job tasks    Plan   Continue Occupational Therapy 3 times per week through current poc expiration date of 2/21/2022, in order to to decrease pain and edema, and increase A/PROM, strength, and functional use of R upper extremity.    Updates/Grading for next session:  Progress with OT as tolerated.      HILLARY Keene, JERADT

## 2022-01-25 ENCOUNTER — CLINICAL SUPPORT (OUTPATIENT)
Dept: REHABILITATION | Facility: HOSPITAL | Age: 47
End: 2022-01-25
Attending: ORTHOPAEDIC SURGERY
Payer: COMMERCIAL

## 2022-01-25 DIAGNOSIS — M62.81 MUSCLE WEAKNESS OF RIGHT ARM: ICD-10-CM

## 2022-01-25 DIAGNOSIS — M25.621 STIFFNESS OF ELBOW JOINT, RIGHT: ICD-10-CM

## 2022-01-25 DIAGNOSIS — M79.601 PAIN, ARM, RIGHT: ICD-10-CM

## 2022-01-25 DIAGNOSIS — Z74.09 IMPAIRED MOBILITY AND ADLS: ICD-10-CM

## 2022-01-25 DIAGNOSIS — Z78.9 IMPAIRED MOBILITY AND ADLS: ICD-10-CM

## 2022-01-25 PROCEDURE — 97110 THERAPEUTIC EXERCISES: CPT | Mod: PO

## 2022-01-25 PROCEDURE — 97140 MANUAL THERAPY 1/> REGIONS: CPT | Mod: PO

## 2022-01-25 NOTE — PROGRESS NOTES
Occupational Therapy Daily Treatment Note     Name: Khadar Jamison [facility]  Clinic Number: [id number]    Therapy Diagnosis:   Encounter Diagnoses   Name Primary?    Pain, arm, right     Muscle weakness of right arm     Stiffness of elbow joint, right     Impaired mobility and ADLs      Physician: Bhavesh Steen MD    Physician orders:  Note     Therapy to the right upper extremity.  Status post distal biceps tendon repair protocol.  Nonweightbearing.  Begin range of motion of full flexion to 60° from full extension.  Increase extension by 10° per week until full extension is reached.     Frequency:  3 times per week     Duration:  6 weeks     Diagnosis:  Status post right distal biceps tendon repair          Visit Date: 1/25/2022    Medical Diagnosis: S46.211D (ICD-10-CM) - Traumatic rupture of right distal biceps tendon, subsequent encounter     Evaluation Date: 1/11/2022  Insurance Authorization period Expiration:  Calendar year  Plan of Care Expiration Period: 6 weeks = 2/21/2022  Next Re-assessment: 30 days (2/10/2022) and/or 10th visit  Date of Return Referring Provider 2/07/2022     Visit # / Visits Authortized:  5 / 20  # No shows 0 / # Cancellations: 0  Time In: 0805  Time Out: 0840  Total Billable Time: 30 minutes     Precautions: Standard and per protocol  Surgery: DATE OF SURGERY: 12/23/2021      PRE-OPERATIVE DIAGNOSIS:  Right distal biceps tendon rupture     POST-OPERATIVE DIAGNOSIS:  Right distal biceps tendon rupture      ANESTHESIA TYPE:  General with a single-shot supraclavicular block     BLOOD LOSS:  Less than 10 cc     TOURNIQUET TIME:  None     SURGEON: Dr Steen     ASSISTANT: Dr Darion Gilmore Assistant surgeon was present for the entire case and did assist with advancement of the biceps tendon as well as fixation of the biceps tendon to the radius as well as wound closure.     PROCEDURE:  Right distal biceps tendon repair     IMPLANTS:  Ridge distal biceps tendon button x1,  "Ridge bio interference screw size 7 mm x 1                                         S/P: 4 weeks and 5 days on 1/25/2022    Subjective     Pt reports: "The only pain I have is in my R shoulder"  "he was compliant with HEP.   Response to previous treatment: Very good  Functional change: None allowed R UE    Functional Pain Scale Rating 0-10:   0/10 on average  0/10 at best  1-2/10 at worst  Location: R shoulder  Description: Aching, Dull, Burning, Throbbing and Variable,   Aggravating Factors: pressure/touvh wrist/scar and posterior elbow.  Easing Factors: rest    Objective   Quickdash from Eval = 45.45%    Last measurements from Eval unless otherwise noted below.    MEASUREMENTS  Observation/Inspection/Wound/Incision/Scar  Incision, closed/healed with only mild overall thickening     Sensation: Grossly WNL     Edema:     Moderate:  non pitting RUE.   1/18/2022       Circumferential (in cm) L R   Proximal Wrist Crease 17.7 18.3   MPs NT NT   Long Proximal Phalanx NT NT      AROM Hand: Tip to palm/DPC digits 1-5 (in cm) WNL      PROM Elbow in brace: 1/25/2022  Able to hit stop of -40 degrees with ext.    AROM 1/25/2022            Left              Right   Elbow    Forearm Supination Ext/Flex  135/0°      85 Ext/Flex:  NT\135      70 ore therex and 75 degrees post.         Manual Muscle Test: NT                                       and Pinch Strength: NTat this time due to post operative status.        Special and/or Standardized Tests: NT       TREATMENT  Pk received the following supervised modalities after being cleared for contradictions for 4 minutes:    - Moist heat to R shoulder    Pk received the following direct contact modalities after being cleared for contraindications for 0 minutes:  -NT    Pk received the following manual therapy techniques for 8 minutes:   -retrograde massage and scar massage to RUE    Pk received therapeutic exercises for 22 minutes including:  - AROM out of brace thumb up, " supination at top, and palm up 2x10 each while not exceeding current 40 degree ext limit.  - AAROM supination in brace with elbow flexed at 90 degrees 2x10 with 5 sec holds.  - AROM flexion and gentle AROM ext to stop of brace (-40 degrees)   - B shoulder retraction 2x10    Pk participated in dynamic functional therapeutic activities to improve functional performance for 0  minutes, including:  -NT    Home Exercises and Education Provided     Education provided:   - Cont per previous.    Written Home Exercises Provided:  Patient instructed to cont prior HEP.    Exercises were reviewed and Pk was able to demonstrate them prior to the end of the session.  Pk demonstrated good  understanding of the education provided.     See EMR under Media for exercises provided today and/or prior visit.        Assessment     Pt progressed with some AROM/AAROM elbow flexion and supination. Light assist with supination of 1# with elbow flexed at 90 degrees tolerated well today. Pt instructed to cont with no lifting using RUE and use of small bottle or can only for light assistance with supination emphasized. Will cont per current poc.     - Progress towards goals:  STG #1 has been met.    Pk is progressing well towards his goals . Pt continues with good rehab potential.     Pt will continue to benefit from skilled outpatient occupational therapy to address the deficits listed in the problem list on initial evaluation in order to maximize pt's level of independence in the home and community.     Anticipated barriers to occupational therapy: None    Pt's spiritual, cultural and educational needs considered and pt agreeable to plan of care and goals.    Goals:  Short Term Goals: (30 days, 2/10/2022, and/or 10th visit) unless otherwise noted below.  1. Pt will be independent with HEP in 2 visits.  2. Pt will report decreased pain to a 2/10 at worst in RUE with ADLs in order to increase function/use of UE.   3. Pt will increase  AROM R elbow ext to to at least -30 degrees in prep for return to RUE use for ADL..     Long Term Goals: (by discharge)  1. Pt will report decreased pain to 1-2/10 with ADLs to allow for increased function/use of UE.   2. Pt will exhibit WNL AROM of  RUE to allow for Independent use of for all ADL/IADL tasks.  3. Pt will exhibit  WFL strength RUE to allow a firm grasp during ADL/IADL (cooking utensils, tool use, carrying groceries, steering wheel, etc.)  4. Pt will return to PLOF with all ADL/IADL, leisure and job tasks    Plan   Continue Occupational Therapy 3 times per week through current [facility] expiration date of 2/21/2022, in order to to decrease pain and edema, and increase A/PROM, strength, and functional use of R upper extremity.    Updates/Grading for next session:  Progress with OT as tolerated.    IHLLARY Turner/SHELBI

## 2022-01-26 ENCOUNTER — CLINICAL SUPPORT (OUTPATIENT)
Dept: REHABILITATION | Facility: HOSPITAL | Age: 47
End: 2022-01-26
Attending: ORTHOPAEDIC SURGERY
Payer: COMMERCIAL

## 2022-01-26 DIAGNOSIS — Z74.09 IMPAIRED MOBILITY AND ADLS: ICD-10-CM

## 2022-01-26 DIAGNOSIS — M62.81 MUSCLE WEAKNESS OF RIGHT ARM: ICD-10-CM

## 2022-01-26 DIAGNOSIS — Z78.9 IMPAIRED MOBILITY AND ADLS: ICD-10-CM

## 2022-01-26 DIAGNOSIS — M25.621 STIFFNESS OF ELBOW JOINT, RIGHT: ICD-10-CM

## 2022-01-26 DIAGNOSIS — M79.601 PAIN, ARM, RIGHT: Primary | ICD-10-CM

## 2022-01-26 PROCEDURE — 97110 THERAPEUTIC EXERCISES: CPT | Mod: PO

## 2022-01-26 PROCEDURE — 97140 MANUAL THERAPY 1/> REGIONS: CPT | Mod: PO

## 2022-01-26 NOTE — PROGRESS NOTES
Occupational Therapy Daily Treatment Note     Name: Khadar Jamison [facility]  Clinic Number: [id number]    Therapy Diagnosis:   Encounter Diagnoses   Name Primary?    Pain, arm, right Yes    Muscle weakness of right arm     Stiffness of elbow joint, right     Impaired mobility and ADLs      Physician: Bhavesh Steen MD    Physician orders:  Note     Therapy to the right upper extremity.  Status post distal biceps tendon repair protocol.  Nonweightbearing.  Begin range of motion of full flexion to 60° from full extension.  Increase extension by 10° per week until full extension is reached.     Frequency:  3 times per week     Duration:  6 weeks     Diagnosis:  Status post right distal biceps tendon repair          Visit Date: 1/26/2022    Medical Diagnosis: S46.211D (ICD-10-CM) - Traumatic rupture of right distal biceps tendon, subsequent encounter     Evaluation Date: 1/11/2022  Insurance Authorization period Expiration:  Calendar year  Plan of Care Expiration Period: 6 weeks = 2/21/2022  Next Re-assessment: 30 days (2/10/2022) and/or 10th visit  Date of Return Referring Provider 2/07/2022     Visit # / Visits Authortized:  6 / 20  # No shows 0 / # Cancellations: 0  Time In: 0745  Time Out: 0820  Total Billable Time: 30 minutes     Precautions: Standard and per protocol  Surgery: DATE OF SURGERY: 12/23/2021      PRE-OPERATIVE DIAGNOSIS:  Right distal biceps tendon rupture     POST-OPERATIVE DIAGNOSIS:  Right distal biceps tendon rupture      ANESTHESIA TYPE:  General with a single-shot supraclavicular block     BLOOD LOSS:  Less than 10 cc     TOURNIQUET TIME:  None     SURGEON: Dr Steen     ASSISTANT: Dr Darion Gilmore Assistant surgeon was present for the entire case and did assist with advancement of the biceps tendon as well as fixation of the biceps tendon to the radius as well as wound closure.     PROCEDURE:  Right distal biceps tendon repair     IMPLANTS:  Ridge distal biceps tendon button x1,  "Ridge bio interference screw size 7 mm x 1                                         S/P: 4 weeks and 6 days on 1/26/2022    Subjective     Pt reports: No new symptoms or complaints  "he was compliant with HEP.   Response to previous treatment: Very good  Functional change: None allowed R UE    Functional Pain Scale Rating 0-10:   0/10 on average  0/10 at best  1-2/10 at worst  Location: R shoulder  Description: Aching, Dull, Burning, Throbbing and Variable,   Aggravating Factors: pressure/touvh wrist/scar and posterior elbow.  Easing Factors: rest    Objective   Quickdash from Eval = 45.45%    Last measurements from Eval unless otherwise noted below.    MEASUREMENTS  Observation/Inspection/Wound/Incision/Scar  Incision, closed/healed with only mild overall thickening     Sensation: Grossly WNL     Edema:     Moderate:  non pitting RUE.   1/18/2022       Circumferential (in cm) L R   Proximal Wrist Crease 17.7 18.3   MPs NT NT   Long Proximal Phalanx NT NT      AROM Hand: Tip to palm/DPC digits 1-5 (in cm) WNL      PROM Elbow in brace: 1/25/2022  Able to hit stop of -40 degrees with ext.    AROM 1/25/2022            Left              Right   Elbow    Forearm Supination Ext/Flex  135/0°      85 Ext/Flex:  NT\135      1/26/2022 pre therex and 77 degrees post.         Manual Muscle Test: NT                                       and Pinch Strength: NTat this time due to post operative status.        Special and/or Standardized Tests: NT       TREATMENT  Pk received the following supervised modalities after being cleared for contradictions for 4 minutes:    - Moist heat to R shoulder 2 min and to R forearm 2 min.    Pk received the following direct contact modalities after being cleared for contraindications for 0 minutes:  -NT    Pk received the following manual therapy techniques for 8 minutes:   -retrograde massage and scar massage to RUE    Pk received therapeutic exercises for 22 minutes including:  - " AROM out of brace thumb up, supination at top, and palm up 2x10 each while not exceeding current 40 degree ext limit.  - AAROM supination in brace with elbow flexed at 90 degrees 2x10 with 5 sec holds.  - AROM flexion and gentle AROM ext to stop of brace (-40 degrees)   - B shoulder retraction 2x10    Pk participated in dynamic functional therapeutic activities to improve functional performance for 0  minutes, including:  -NT    Home Exercises and Education Provided     Education provided:   - Cont per previous.    Written Home Exercises Provided:  Patient instructed to cont prior HEP.    Exercises were reviewed and Pk was able to demonstrate them prior to the end of the session.  Pk demonstrated good  understanding of the education provided.     See EMR under Media for exercises provided today and/or prior visit.        Assessment     Pt progressing with 2 degrees of increased AROM supination today. Cont per current poc.     - Progress towards goals:  STG #1 has been met.    Pk is progressing well towards his goals . Pt continues with good rehab potential.     Pt will continue to benefit from skilled outpatient occupational therapy to address the deficits listed in the problem list on initial evaluation in order to maximize pt's level of independence in the home and community.     Anticipated barriers to occupational therapy: None    Pt's spiritual, cultural and educational needs considered and pt agreeable to plan of care and goals.    Goals:  Short Term Goals: (30 days, 2/10/2022, and/or 10th visit) unless otherwise noted below.  1. Pt will be independent with HEP in 2 visits.  2. Pt will report decreased pain to a 2/10 at worst in RUE with ADLs in order to increase function/use of UE.   3. Pt will increase AROM R elbow ext to to at least -30 degrees in prep for return to RUE use for ADL..     Long Term Goals: (by discharge)  1. Pt will report decreased pain to 1-2/10 with ADLs to allow for increased  function/use of UE.   2. Pt will exhibit WNL AROM of  RUE to allow for Independent use of for all ADL/IADL tasks.  3. Pt will exhibit  WFL strength RUE to allow a firm grasp during ADL/IADL (cooking utensils, tool use, carrying groceries, steering wheel, etc.)  4. Pt will return to PLOF with all ADL/IADL, leisure and job tasks    Plan   Continue Occupational Therapy 3 times per week through current poc expiration date of 2/21/2022, in order to to decrease pain and edema, and increase A/PROM, strength, and functional use of R upper extremity.    Updates/Grading for next session:  Progress with OT as tolerated.    HILLARY Turner/SHELBI

## 2022-01-28 ENCOUNTER — CLINICAL SUPPORT (OUTPATIENT)
Dept: REHABILITATION | Facility: HOSPITAL | Age: 47
End: 2022-01-28
Attending: ORTHOPAEDIC SURGERY
Payer: COMMERCIAL

## 2022-01-28 DIAGNOSIS — M25.621 STIFFNESS OF ELBOW JOINT, RIGHT: ICD-10-CM

## 2022-01-28 DIAGNOSIS — M62.81 MUSCLE WEAKNESS OF RIGHT ARM: ICD-10-CM

## 2022-01-28 DIAGNOSIS — M79.601 PAIN, ARM, RIGHT: ICD-10-CM

## 2022-01-28 DIAGNOSIS — Z78.9 IMPAIRED MOBILITY AND ADLS: ICD-10-CM

## 2022-01-28 DIAGNOSIS — Z74.09 IMPAIRED MOBILITY AND ADLS: ICD-10-CM

## 2022-01-28 PROCEDURE — 97140 MANUAL THERAPY 1/> REGIONS: CPT | Mod: PO

## 2022-01-28 PROCEDURE — 97110 THERAPEUTIC EXERCISES: CPT | Mod: PO

## 2022-01-28 NOTE — PROGRESS NOTES
Occupational Therapy Daily Treatment Note     Name: Khadar Jamison [facility]  Clinic Number: [id number]    Therapy Diagnosis:   Encounter Diagnoses   Name Primary?    Pain, arm, right     Muscle weakness of right arm     Stiffness of elbow joint, right     Impaired mobility and ADLs      Physician: Bhavesh Steen MD    Physician orders:  Note     Therapy to the right upper extremity.  Status post distal biceps tendon repair protocol.  Nonweightbearing.  Begin range of motion of full flexion to 60° from full extension.  Increase extension by 10° per week until full extension is reached.     Frequency:  3 times per week     Duration:  6 weeks     Diagnosis:  Status post right distal biceps tendon repair          Visit Date: 1/28/2022    Medical Diagnosis: S46.211D (ICD-10-CM) - Traumatic rupture of right distal biceps tendon, subsequent encounter     Evaluation Date: 1/11/2022  Insurance Authorization period Expiration:  Calendar year  Plan of Care Expiration Period: 6 weeks = 2/21/2022  Next Re-assessment: 30 days (2/10/2022) and/or 10th visit  Date of Return Referring Provider 2/07/2022     Visit # / Visits Authortized:  7 / 20  # No shows 0 / # Cancellations: 0  Time In: 0745  Time Out: 0820  Total Billable Time: 30 minutes     Precautions: Standard and per protocol  Surgery: DATE OF SURGERY: 12/23/2021      PRE-OPERATIVE DIAGNOSIS:  Right distal biceps tendon rupture     POST-OPERATIVE DIAGNOSIS:  Right distal biceps tendon rupture      ANESTHESIA TYPE:  General with a single-shot supraclavicular block     BLOOD LOSS:  Less than 10 cc     TOURNIQUET TIME:  None     SURGEON: Dr Steen     ASSISTANT: Dr Darion Gilmore Assistant surgeon was present for the entire case and did assist with advancement of the biceps tendon as well as fixation of the biceps tendon to the radius as well as wound closure.     PROCEDURE:  Right distal biceps tendon repair     IMPLANTS:  Ridge distal biceps tendon button x1,  "Ridge bio interference screw size 7 mm x 1                                         S/P: 5 weeks and 1 day on 1/28/2022    Subjective     Pt reports: No new symptoms or complaints  "he was compliant with HEP.   Response to previous treatment: Very good  Functional change: None allowed R UE    Functional Pain Scale Rating 0-10:   0/10 on average  0/10 at best  1-2/10 at worst  Location: R shoulder  Description: Aching, Dull, Burning, Throbbing and Variable,   Aggravating Factors: pressure/touvh wrist/scar and posterior elbow.  Easing Factors: rest    Objective   Quickdash from Eval = 45.45%    Last measurements from Eval unless otherwise noted below.    MEASUREMENTS  Observation/Inspection/Wound/Incision/Scar  Incision, closed/healed with only mild overall thickening     Sensation: Grossly WNL     Edema:     Moderate:  non pitting RUE.   1/18/2022       Circumferential (in cm) L R   Proximal Wrist Crease 17.7 18.3   MPs NT NT   Long Proximal Phalanx NT NT      AROM Hand: Tip to palm/DPC digits 1-5 (in cm) WNL      PROM Elbow in brace: 1/25/2022  Able to hit stop of -30 degrees with ext.    AROM 1/25/2022            Left              Right   Elbow    Forearm Supination Ext/Flex  135/0°      85 Ext/Flex:  NT\135      1/26/2022 pre therex and 77 degrees post.         Manual Muscle Test: NT                                       and Pinch Strength: NTat this time due to post operative status.        Special and/or Standardized Tests: NT       TREATMENT  Pk received the following supervised modalities after being cleared for contradictions for 4 minutes:    - Moist heat to R forearm 4 min.    Pk received the following direct contact modalities after being cleared for contraindications for 0 minutes:  -NT    Pk received the following manual therapy techniques for 8 minutes:   -retrograde massage and scar massage to RUE    Pk received therapeutic exercises for 22 minutes including:  - AROM out of brace thumb up, " supination at top, and palm up 2x10 each while not exceeding current 40 degree ext limit.  - AAROM supination in brace with elbow flexed at 90 degrees 2x10 with 5 sec holds.  - AROM flexion and gentle AROM ext to stop of brace (-40 degrees)   - B shoulder retraction 2x10    Pk participated in dynamic functional therapeutic activities to improve functional performance for 0  minutes, including:  -NT    Home Exercises and Education Provided     Education provided:   - Cont per previous.    Written Home Exercises Provided:  Patient instructed to cont prior HEP.    Exercises were reviewed and Pk was able to demonstrate them prior to the end of the session.  Pk demonstrated good  understanding of the education provided.     See EMR under Media for exercises provided today and/or prior visit.        Assessment     Pt able to easily reach new stop of -30 degrees in brace with elbow ext. Cont per current poc..     - Progress towards goals:  STG #1 has been met.    Pk is progressing well towards his goals . Pt continues with good rehab potential.     Pt will continue to benefit from skilled outpatient occupational therapy to address the deficits listed in the problem list on initial evaluation in order to maximize pt's level of independence in the home and community.     Anticipated barriers to occupational therapy: None    Pt's spiritual, cultural and educational needs considered and pt agreeable to plan of care and goals.    Goals:  Short Term Goals: (30 days, 2/10/2022, and/or 10th visit) unless otherwise noted below.  1. Pt will be independent with HEP in 2 visits.  2. Pt will report decreased pain to a 2/10 at worst in RUE with ADLs in order to increase function/use of UE.   3. Pt will increase AROM R elbow ext to to at least -30 degrees in prep for return to RUE use for ADL..     Long Term Goals: (by discharge)  1. Pt will report decreased pain to 1-2/10 with ADLs to allow for increased function/use of UE.   2.  Pt will exhibit WNL AROM of  RUE to allow for Independent use of for all ADL/IADL tasks.  3. Pt will exhibit  WFL strength RUE to allow a firm grasp during ADL/IADL (cooking utensils, tool use, carrying groceries, steering wheel, etc.)  4. Pt will return to PLOF with all ADL/IADL, leisure and job tasks    Plan   Continue Occupational Therapy 3 times per week through current poc expiration date of 2/21/2022, in order to to decrease pain and edema, and increase A/PROM, strength, and functional use of R upper extremity.    Updates/Grading for next session:  Progress with OT as tolerated.    HILLARY Turner/SHELBI

## 2022-02-01 ENCOUNTER — CLINICAL SUPPORT (OUTPATIENT)
Dept: REHABILITATION | Facility: HOSPITAL | Age: 47
End: 2022-02-01
Attending: ORTHOPAEDIC SURGERY
Payer: COMMERCIAL

## 2022-02-01 DIAGNOSIS — Z74.09 IMPAIRED MOBILITY AND ADLS: ICD-10-CM

## 2022-02-01 DIAGNOSIS — M62.81 MUSCLE WEAKNESS OF RIGHT ARM: ICD-10-CM

## 2022-02-01 DIAGNOSIS — M25.621 STIFFNESS OF ELBOW JOINT, RIGHT: ICD-10-CM

## 2022-02-01 DIAGNOSIS — Z78.9 IMPAIRED MOBILITY AND ADLS: ICD-10-CM

## 2022-02-01 DIAGNOSIS — M79.601 PAIN, ARM, RIGHT: ICD-10-CM

## 2022-02-01 PROCEDURE — 97110 THERAPEUTIC EXERCISES: CPT | Mod: PO

## 2022-02-01 NOTE — PROGRESS NOTES
Occupational Therapy Daily Treatment Note     Name: Khadar Jamison [facility]  Clinic Number: [id number]    Therapy Diagnosis:   Encounter Diagnoses   Name Primary?    Pain, arm, right     Muscle weakness of right arm     Stiffness of elbow joint, right     Impaired mobility and ADLs      Physician: Bhavesh Steen MD    Physician orders:  Note     Therapy to the right upper extremity.  Status post distal biceps tendon repair protocol.  Nonweightbearing.  Begin range of motion of full flexion to 60° from full extension.  Increase extension by 10° per week until full extension is reached.     Frequency:  3 times per week     Duration:  6 weeks     Diagnosis:  Status post right distal biceps tendon repair          Visit Date: 2/1/2022    Medical Diagnosis: S46.211D (ICD-10-CM) - Traumatic rupture of right distal biceps tendon, subsequent encounter     Evaluation Date: 1/11/2022  Insurance Authorization period Expiration:  Calendar year  Plan of Care Expiration Period: 6 weeks = 2/21/2022  Next Re-assessment: 30 days (2/10/2022) and/or 10th visit  Date of Return Referring Provider 2/07/2022     Visit # / Visits Authortized:  8 / 20  # No shows 0 / # Cancellations: 0  Time In: 0804  Time Out: 0835  Total Billable Time: 25 minutes     Precautions: Standard and per protocol  Surgery: DATE OF SURGERY: 12/23/2021      PRE-OPERATIVE DIAGNOSIS:  Right distal biceps tendon rupture     POST-OPERATIVE DIAGNOSIS:  Right distal biceps tendon rupture      ANESTHESIA TYPE:  General with a single-shot supraclavicular block     BLOOD LOSS:  Less than 10 cc     TOURNIQUET TIME:  None     SURGEON: Dr Steen     ASSISTANT: Dr Darion Gilmore Assistant surgeon was present for the entire case and did assist with advancement of the biceps tendon as well as fixation of the biceps tendon to the radius as well as wound closure.     PROCEDURE:  Right distal biceps tendon repair     IMPLANTS:  Ridge distal biceps tendon button x1,  "Ridge bio interference screw size 7 mm x 1                                         S/P:  5 weeks and 4 days on 2/1/2022    Subjective     Pt reports: No new symptoms or complaints  "he was compliant with HEP.   Response to previous treatment: Very good  Functional change: None allowed R UE    Functional Pain Scale Rating 0-10:   0/10 on average  0/10 at best  1-2/10 at worst  Location: R shoulder  Description: Aching, Dull, Burning, Throbbing and Variable,   Aggravating Factors: pressure/touvh wrist/scar and posterior elbow.  Easing Factors: rest    Objective   Quickdash from Eval = 45.45%    Last measurements from Eval unless otherwise noted below.    MEASUREMENTS  Observation/Inspection/Wound/Incision/Scar  Incision, closed/healed with only mild overall thickening     Sensation: Grossly WNL     Edema:     Moderate:  non pitting RUE.   1/18/2022       Circumferential (in cm) L R   Proximal Wrist Crease 17.7 18.3   MPs NT NT   Long Proximal Phalanx NT NT      AROM Hand: Tip to palm/DPC digits 1-5 (in cm) WNL      PROM Elbow in brace: 1/25/2022  Able to hit stop of -30 degrees with ext.    AROM 1/25/2022            Left              Right   Elbow    Forearm Supination Ext/Flex  135/0°      85 Ext/Flex:  NT\135      2/1/2022 81 degrees post.         Manual Muscle Test: NT                                       and Pinch Strength: NTat this time due to post operative status.        Special and/or Standardized Tests: NT       TREATMENT  Pk received the following supervised modalities after being cleared for contradictions for 4 minutes:    - Moist heat to R forearm 4 min.    Pk received the following direct contact modalities after being cleared for contraindications for 0 minutes:  -NT    Pk received the following manual therapy techniques for 2 minutes:   -retrograde massage and scar massage to RUE    Pk received therapeutic exercises for 23 minutes including:  - AROM out of brace thumb up, supination at " top, and palm up 2x10 each while not exceeding current 30 degree ext limit.  - AAROM supination in brace with elbow flexed at 90 degrees 2x10 with 5 sec holds.  - Gentle PROM supination and wrist flexion and ext to elbow at 90 degrees flexion.  - AROM flexion and gentle AROM ext to stop of brace (-30 degrees)   - B shoulder retraction 2x10    Pk participated in dynamic functional therapeutic activities to improve functional performance for 0  minutes, including:  -NT    Home Exercises and Education Provided     Education provided:   - Cont per previous.    Written Home Exercises Provided:  Patient instructed to cont prior HEP.    Exercises were reviewed and Pk was able to demonstrate them prior to the end of the session.  Pk demonstrated good  understanding of the education provided.     See EMR under Media for exercises provided today and/or prior visit.        Assessment     Pt progressiing well with increased supination of 4 degrees to 81 degrees today post tx. Cont per current poc.    - Progress towards goals:  STG #1 has been met.    Pk is progressing well towards his goals . Pt continues with good rehab potential.     Pt will continue to benefit from skilled outpatient occupational therapy to address the deficits listed in the problem list on initial evaluation in order to maximize pt's level of independence in the home and community.     Anticipated barriers to occupational therapy: None    Pt's spiritual, cultural and educational needs considered and pt agreeable to plan of care and goals.    Goals:  Short Term Goals: (30 days, 2/10/2022, and/or 10th visit) unless otherwise noted below.  1. Pt will be independent with HEP in 2 visits.  2. Pt will report decreased pain to a 2/10 at worst in RUE with ADLs in order to increase function/use of UE.   3. Pt will increase AROM R elbow ext to to at least -30 degrees in prep for return to RUE use for ADL..     Long Term Goals: (by discharge)  1. Pt will  report decreased pain to 1-2/10 with ADLs to allow for increased function/use of UE.   2. Pt will exhibit WNL AROM of  RUE to allow for Independent use of for all ADL/IADL tasks.  3. Pt will exhibit  WFL strength RUE to allow a firm grasp during ADL/IADL (cooking utensils, tool use, carrying groceries, steering wheel, etc.)  4. Pt will return to OF with all ADL/IADL, leisure and job tasks    Plan   Continue Occupational Therapy 3 times per week through current poc expiration date of 2/21/2022, in order to to decrease pain and edema, and increase A/PROM, strength, and functional use of R upper extremity.    Updates/Grading for next session:  Progress with OT as tolerated.    HILLARY Turner/SHELBI

## 2022-02-02 ENCOUNTER — CLINICAL SUPPORT (OUTPATIENT)
Dept: REHABILITATION | Facility: HOSPITAL | Age: 47
End: 2022-02-02
Attending: ORTHOPAEDIC SURGERY
Payer: COMMERCIAL

## 2022-02-02 DIAGNOSIS — M25.621 STIFFNESS OF ELBOW JOINT, RIGHT: ICD-10-CM

## 2022-02-02 DIAGNOSIS — M79.601 PAIN, ARM, RIGHT: ICD-10-CM

## 2022-02-02 DIAGNOSIS — Z74.09 IMPAIRED MOBILITY AND ADLS: ICD-10-CM

## 2022-02-02 DIAGNOSIS — Z78.9 IMPAIRED MOBILITY AND ADLS: ICD-10-CM

## 2022-02-02 DIAGNOSIS — M62.81 MUSCLE WEAKNESS OF RIGHT ARM: ICD-10-CM

## 2022-02-02 PROCEDURE — 97110 THERAPEUTIC EXERCISES: CPT | Mod: PO

## 2022-02-02 NOTE — PROGRESS NOTES
Occupational Therapy Reasessment and Daily Treatment Note     Name: Khadar Jamison [facility] Number: [id number]    Therapy Diagnosis:   Encounter Diagnoses   Name Primary?    Pain, arm, right Yes    Muscle weakness of right arm     Stiffness of elbow joint, right     Impaired mobility and ADLs      Physician: Bhavesh Steen MD    Physician orders:  Note     Therapy to the right upper extremity.  Status post distal biceps tendon repair protocol.  Nonweightbearing.  Begin range of motion of full flexion to 60° from full extension.  Increase extension by 10° per week until full extension is reached.     Frequency:  3 times per week     Duration:  6 weeks     Diagnosis:  Status post right distal biceps tendon repair          Visit Date: 2/4/2022    Medical Diagnosis: S46.211D (ICD-10-CM) - Traumatic rupture of right distal biceps tendon, subsequent encounter     Evaluation Date: 1/11/2022  Insurance Authorization period Expiration:  Calendar year  Plan of Care Expiration Period: 6 weeks = 2/21/2022  Next Re-assessment: By poc exp date of 2/21/2022  Date of Return Referring Provider 2/07/2022     Visit # / Visits Authortized:  10 / 20  # No shows 0 / # Cancellations: 0  Time In: 0745  Time Out: 0820  Total Billable Time: 35 minutes     Precautions: Standard and per protocol  Surgery: DATE OF SURGERY: 12/23/2021      PRE-OPERATIVE DIAGNOSIS:  Right distal biceps tendon rupture     POST-OPERATIVE DIAGNOSIS:  Right distal biceps tendon rupture      ANESTHESIA TYPE:  General with a single-shot supraclavicular block     BLOOD LOSS:  Less than 10 cc     TOURNIQUET TIME:  None     SURGEON: Dr Steen     ASSISTANT: Dr Darion Gilmore Assistant surgeon was present for the entire case and did assist with advancement of the biceps tendon as well as fixation of the biceps tendon to the radius as well as wound closure.     PROCEDURE:  Right distal biceps tendon repair     IMPLANTS:  Ridge distal biceps tendon  button x1, Ridge bio interference screw size 7 mm x 1                                         S/P:  6 weeks on 2/4/2022    Subjective     Pt reports: No new symptoms or complaints  he was compliant with HEP.   Response to previous treatment: Very good  Functional change: None allowed R UE    Functional Pain Scale Rating 0-10:   0/10 on average  0/10 at best  1-2/10 at worst  Location: R shoulder  Description: Aching, Dull, Burning, Throbbing and Variable,   Aggravating Factors: pressure/touvh wrist/scar and posterior elbow.  Easing Factors: rest    Objective   Quickdash from Eval = 45.45%    Last measurements from Eval unless otherwise noted below.    MEASUREMENTS  Observation/Inspection/Wound/Incision/Scar  Incision, closed/healed with only mild overall thickening     Sensation: Grossly WNL     Edema:     Moderate:  non pitting RUE.   2/4/2022       Circumferential (in cm) L R   Proximal Wrist Crease 17.7 18.0   MPs NT NT   Long Proximal Phalanx NT NT      AROM Hand: Tip to palm/DPC digits 1-5 (in cm) WNL      AROM Elbow in brace:  2/4/2022  Able to hit stop of -20 degrees with ext.    AROM 2/4/2022            Left              Right   Elbow    Forearm Supination Ext/Flex  0/135°      85 Ext/Flex:  -20/135      2/4/2022 85 degrees post.         Manual Muscle Test: NT                                       and Pinch Strength: NT at this time due to post operative status.      Special and/or Standardized Tests: NT       TREATMENT  kP received the following supervised modalities after being cleared for contradictions for 4 minutes:    - Moist heat to R forearm 4 min.    Pk received the following direct contact modalities after being cleared for contraindications for 0 minutes:  -NT    Pk received the following manual therapy techniques for 0 minutes:   -retrograde massage and scar massage to RUE.    Pk received therapeutic exercises for 20 minutes including:  -  AROM wrist flex and ext with 1# Tbar and  forearm at knee level to block. 3x10.  -  AROM flexion and gentle AROM ext to stop of brace (-20 degrees)   -  B shoulder retraction 2x10  -  Wrist dexterciser 3 sets     Pk participated in dynamic functional therapeutic activities to improve functional performance for 10 minutes, including:  - Wrist dexticiser 3 sets   - Isopsheres 3 min  - In hand manipulation beans x sets with 20 beans.    Home Exercises and Education Provided     Education provided:   - Cont per previous.    Written Home Exercises Provided:  Patient instructed to cont prior HEP.    Exercises were reviewed and Pk was able to demonstrate them prior to the end of the session.  Pk demonstrated good  understanding of the education provided.     See EMR under Media for exercises provided today and/or prior visit.        Assessment     Pt tolerated progression with tx well today. Pt able to hit new ext stop of brace post adj to -20 degrees. All previous goals have been met. New goal set in order to further progress. Cont per current poc.    - Progress towards goals:  STG #1 has been met.    Pk is progressing well towards his goals . Pt continues with good rehab potential.     Pt will continue to benefit from skilled outpatient occupational therapy to address the deficits listed in the problem list on initial evaluation in order to maximize pt's level of independence in the home and community.     Anticipated barriers to occupational therapy: None    Pt's spiritual, cultural and educational needs considered and pt agreeable to plan of care and goals.    Goals:  Short Term Goals: (30 days, 2/10/2022, and/or 10th visit) unless otherwise noted below.  1. Pt will be independent with HEP in 2 visits.MET  2. Pt will report decreased pain to a 2/10 at worst in RUE with ADLs in order to increase function/use of UE. MET  3. Pt will increase AROM R elbow ext to to at least -30 degrees in prep for return to RUE use for ADL. MET    Short Term Goals: (by poc  exp date of 2/21/2022) unless otherwise noted below.   4. Pt will increase AROM R elbow ext to to 0 degrees in prep for return to RUE use for ADL.     Long Term Goals: (by discharge)  1. Pt will report decreased pain to 1-2/10 with ADLs to allow for increased function/use of UE.   2. Pt will exhibit WNL AROM of  RUE to allow for Independent use of for all ADL/IADL tasks.  3. Pt will exhibit  WFL strength RUE to allow a firm grasp during ADL/IADL (cooking utensils, tool use, carrying groceries, steering wheel, etc.)  4. Pt will return to PLOF with all ADL/IADL, leisure and job tasks    Plan   Continue Occupational Therapy 3 times per week through current poc expiration date of 2/21/2022, in order to to decrease pain and edema, and increase A/PROM, strength, and functional use of R upper extremity.    Updates/Grading for next session:  Progress with OT as tolerated.    HILLARY Turner/SHELBI

## 2022-02-02 NOTE — PROGRESS NOTES
Occupational Therapy Daily Treatment Note     Name: Khadar Jamison [facility]  Clinic Number: [id number]    Therapy Diagnosis:   Encounter Diagnoses   Name Primary?    Pain, arm, right     Muscle weakness of right arm     Stiffness of elbow joint, right     Impaired mobility and ADLs      Physician: Bhavesh Steen MD    Physician orders:  Note     Therapy to the right upper extremity.  Status post distal biceps tendon repair protocol.  Nonweightbearing.  Begin range of motion of full flexion to 60° from full extension.  Increase extension by 10° per week until full extension is reached.     Frequency:  3 times per week     Duration:  6 weeks     Diagnosis:  Status post right distal biceps tendon repair          Visit Date: 2/2/2022    Medical Diagnosis: S46.211D (ICD-10-CM) - Traumatic rupture of right distal biceps tendon, subsequent encounter     Evaluation Date: 1/11/2022  Insurance Authorization period Expiration:  Calendar year  Plan of Care Expiration Period: 6 weeks = 2/21/2022  Next Re-assessment: 30 days (2/10/2022) and/or 10th visit  Date of Return Referring Provider 2/07/2022     Visit # / Visits Authortized:  9 / 20  # No shows 0 / # Cancellations: 0  Time In: 0745  Time Out: 0825  Total Billable Time: 35 minutes     Precautions: Standard and per protocol  Surgery: DATE OF SURGERY: 12/23/2021      PRE-OPERATIVE DIAGNOSIS:  Right distal biceps tendon rupture     POST-OPERATIVE DIAGNOSIS:  Right distal biceps tendon rupture      ANESTHESIA TYPE:  General with a single-shot supraclavicular block     BLOOD LOSS:  Less than 10 cc     TOURNIQUET TIME:  None     SURGEON: Dr Steen     ASSISTANT: Dr Darion Gilmore Assistant surgeon was present for the entire case and did assist with advancement of the biceps tendon as well as fixation of the biceps tendon to the radius as well as wound closure.     PROCEDURE:  Right distal biceps tendon repair     IMPLANTS:  Ridge distal biceps tendon button x1,  Ridge bio interference screw size 7 mm x 1                                         S/P:  5 weeks and 5 days on 2/2/2022    Subjective     Pt reports: No new symptoms or complaints  he was compliant with HEP.   Response to previous treatment: Very good  Functional change: None allowed R UE    Functional Pain Scale Rating 0-10:   0/10 on average  0/10 at best  1-2/10 at worst  Location: R shoulder  Description: Aching, Dull, Burning, Throbbing and Variable,   Aggravating Factors: pressure/touvh wrist/scar and posterior elbow.  Easing Factors: rest    Objective   Quickdash from Eval = 45.45%    Last measurements from Eval unless otherwise noted below.    MEASUREMENTS  Observation/Inspection/Wound/Incision/Scar  Incision, closed/healed with only mild overall thickening     Sensation: Grossly WNL     Edema:     Moderate:  non pitting RUE.   1/18/2022       Circumferential (in cm) L R   Proximal Wrist Crease 17.7 18.3   MPs NT NT   Long Proximal Phalanx NT NT      AROM Hand: Tip to palm/DPC digits 1-5 (in cm) WNL      PROM Elbow in brace: 1/25/2022  Able to hit stop of -30 degrees with ext.    AROM 1/25/2022            Left              Right   Elbow    Forearm Supination Ext/Flex  135/0°      85 Ext/Flex:  NT/135      2/1/2022 81 degrees post.         Manual Muscle Test: NT                                       and Pinch Strength: NT at this time due to post operative status.      Special and/or Standardized Tests: NT       TREATMENT  Pk received the following supervised modalities after being cleared for contradictions for 4 minutes:    - Moist heat to R forearm 4 min.    Pk received the following direct contact modalities after being cleared for contraindications for 0 minutes:  -NT    Pk received the following manual therapy techniques for 0 minutes:   -retrograde massage and scar massage to RUE.    Pk received therapeutic exercises for 35 minutes including:  - AROM wrist flex and ext with 1# Tbar and  forearm at knee level to block. 3x10.  - AAROM supination with 1# DB and elbow supported at 90 degrees of flexion.  -  AROM flexion and gentle AROM ext to stop of brace (-30 degrees)   - B shoulder retraction 2x10    Pk participated in dynamic functional therapeutic activities to improve functional performance for 0  minutes, including:  -NT    Home Exercises and Education Provided     Education provided:   - Cont per previous.    Written Home Exercises Provided:  Patient instructed to cont prior HEP.    Exercises were reviewed and Pk was able to demonstrate them prior to the end of the session.  Pk demonstrated good  understanding of the education provided.     See EMR under Media for exercises provided today and/or prior visit.        Assessment     Pt tolerated progression with tx well today. Will adjust brace next visit to -20 degrees  Cont per current poc.    - Progress towards goals:  STG #1 has been met.    Pk is progressing well towards his goals . Pt continues with good rehab potential.     Pt will continue to benefit from skilled outpatient occupational therapy to address the deficits listed in the problem list on initial evaluation in order to maximize pt's level of independence in the home and community.     Anticipated barriers to occupational therapy: None    Pt's spiritual, cultural and educational needs considered and pt agreeable to plan of care and goals.    Goals:  Short Term Goals: (30 days, 2/10/2022, and/or 10th visit) unless otherwise noted below.  1. Pt will be independent with HEP in 2 visits.  2. Pt will report decreased pain to a 2/10 at worst in RUE with ADLs in order to increase function/use of UE.   3. Pt will increase AROM R elbow ext to to at least -30 degrees in prep for return to RUE use for ADL..     Long Term Goals: (by discharge)  1. Pt will report decreased pain to 1-2/10 with ADLs to allow for increased function/use of UE.   2. Pt will exhibit WNL AROM of  RUE to allow  for Independent use of for all ADL/IADL tasks.  3. Pt will exhibit  WFL strength RUE to allow a firm grasp during ADL/IADL (cooking utensils, tool use, carrying groceries, steering wheel, etc.)  4. Pt will return to OF with all ADL/IADL, leisure and job tasks    Plan   Continue Occupational Therapy 3 times per week through current poc expiration date of 2/21/2022, in order to to decrease pain and edema, and increase A/PROM, strength, and functional use of R upper extremity.    Updates/Grading for next session:  Progress with OT as tolerated.    HILLARY Turner/SHELBI

## 2022-02-04 ENCOUNTER — CLINICAL SUPPORT (OUTPATIENT)
Dept: REHABILITATION | Facility: HOSPITAL | Age: 47
End: 2022-02-04
Attending: ORTHOPAEDIC SURGERY
Payer: COMMERCIAL

## 2022-02-04 DIAGNOSIS — Z78.9 IMPAIRED MOBILITY AND ADLS: ICD-10-CM

## 2022-02-04 DIAGNOSIS — M25.621 STIFFNESS OF ELBOW JOINT, RIGHT: ICD-10-CM

## 2022-02-04 DIAGNOSIS — M79.601 PAIN, ARM, RIGHT: Primary | ICD-10-CM

## 2022-02-04 DIAGNOSIS — Z74.09 IMPAIRED MOBILITY AND ADLS: ICD-10-CM

## 2022-02-04 DIAGNOSIS — M62.81 MUSCLE WEAKNESS OF RIGHT ARM: ICD-10-CM

## 2022-02-04 PROCEDURE — 97110 THERAPEUTIC EXERCISES: CPT | Mod: PO

## 2022-02-04 PROCEDURE — 97530 THERAPEUTIC ACTIVITIES: CPT | Mod: PO

## 2022-02-07 ENCOUNTER — OFFICE VISIT (OUTPATIENT)
Dept: ORTHOPEDICS | Facility: CLINIC | Age: 47
End: 2022-02-07
Payer: COMMERCIAL

## 2022-02-07 VITALS
SYSTOLIC BLOOD PRESSURE: 122 MMHG | DIASTOLIC BLOOD PRESSURE: 75 MMHG | HEART RATE: 78 BPM | WEIGHT: 218.06 LBS | BODY MASS INDEX: 34.22 KG/M2 | HEIGHT: 67 IN

## 2022-02-07 DIAGNOSIS — S46.211A TRAUMATIC RUPTURE OF RIGHT DISTAL BICEPS TENDON, INITIAL ENCOUNTER: Primary | ICD-10-CM

## 2022-02-07 PROCEDURE — 3078F PR MOST RECENT DIASTOLIC BLOOD PRESSURE < 80 MM HG: ICD-10-PCS | Mod: CPTII,S$GLB,, | Performed by: ORTHOPAEDIC SURGERY

## 2022-02-07 PROCEDURE — 3008F BODY MASS INDEX DOCD: CPT | Mod: CPTII,S$GLB,, | Performed by: ORTHOPAEDIC SURGERY

## 2022-02-07 PROCEDURE — 1159F PR MEDICATION LIST DOCUMENTED IN MEDICAL RECORD: ICD-10-PCS | Mod: CPTII,S$GLB,, | Performed by: ORTHOPAEDIC SURGERY

## 2022-02-07 PROCEDURE — 1159F MED LIST DOCD IN RCRD: CPT | Mod: CPTII,S$GLB,, | Performed by: ORTHOPAEDIC SURGERY

## 2022-02-07 PROCEDURE — 99999 PR PBB SHADOW E&M-EST. PATIENT-LVL III: ICD-10-PCS | Mod: PBBFAC,,, | Performed by: ORTHOPAEDIC SURGERY

## 2022-02-07 PROCEDURE — 3078F DIAST BP <80 MM HG: CPT | Mod: CPTII,S$GLB,, | Performed by: ORTHOPAEDIC SURGERY

## 2022-02-07 PROCEDURE — 3008F PR BODY MASS INDEX (BMI) DOCUMENTED: ICD-10-PCS | Mod: CPTII,S$GLB,, | Performed by: ORTHOPAEDIC SURGERY

## 2022-02-07 PROCEDURE — 99024 PR POST-OP FOLLOW-UP VISIT: ICD-10-PCS | Mod: S$GLB,,, | Performed by: ORTHOPAEDIC SURGERY

## 2022-02-07 PROCEDURE — 3074F SYST BP LT 130 MM HG: CPT | Mod: CPTII,S$GLB,, | Performed by: ORTHOPAEDIC SURGERY

## 2022-02-07 PROCEDURE — 99024 POSTOP FOLLOW-UP VISIT: CPT | Mod: S$GLB,,, | Performed by: ORTHOPAEDIC SURGERY

## 2022-02-07 PROCEDURE — 99999 PR PBB SHADOW E&M-EST. PATIENT-LVL III: CPT | Mod: PBBFAC,,, | Performed by: ORTHOPAEDIC SURGERY

## 2022-02-07 PROCEDURE — 3074F PR MOST RECENT SYSTOLIC BLOOD PRESSURE < 130 MM HG: ICD-10-PCS | Mod: CPTII,S$GLB,, | Performed by: ORTHOPAEDIC SURGERY

## 2022-02-15 ENCOUNTER — OFFICE VISIT (OUTPATIENT)
Dept: DERMATOLOGY | Facility: CLINIC | Age: 47
End: 2022-02-15
Payer: COMMERCIAL

## 2022-02-15 VITALS — WEIGHT: 218 LBS | BODY MASS INDEX: 34.21 KG/M2 | HEIGHT: 67 IN

## 2022-02-15 DIAGNOSIS — D22.9 MULTIPLE ATYPICAL NEVI: ICD-10-CM

## 2022-02-15 DIAGNOSIS — D22.9 MULTIPLE BENIGN NEVI: ICD-10-CM

## 2022-02-15 DIAGNOSIS — D48.5 NEOPLASM OF UNCERTAIN BEHAVIOR OF SKIN: Primary | ICD-10-CM

## 2022-02-15 DIAGNOSIS — Z86.018 HISTORY OF DYSPLASTIC NEVUS: ICD-10-CM

## 2022-02-15 DIAGNOSIS — L91.8 INFLAMED SKIN TAG: ICD-10-CM

## 2022-02-15 PROCEDURE — 1160F RVW MEDS BY RX/DR IN RCRD: CPT | Mod: CPTII,S$GLB,, | Performed by: DERMATOLOGY

## 2022-02-15 PROCEDURE — 99213 PR OFFICE/OUTPT VISIT, EST, LEVL III, 20-29 MIN: ICD-10-PCS | Mod: 25,S$GLB,, | Performed by: DERMATOLOGY

## 2022-02-15 PROCEDURE — 1159F PR MEDICATION LIST DOCUMENTED IN MEDICAL RECORD: ICD-10-PCS | Mod: CPTII,S$GLB,, | Performed by: DERMATOLOGY

## 2022-02-15 PROCEDURE — 11103 PR TANGENTIAL BIOPSY, SKIN, EA ADDTL LESION: ICD-10-PCS | Mod: S$GLB,,, | Performed by: DERMATOLOGY

## 2022-02-15 PROCEDURE — 99999 PR PBB SHADOW E&M-EST. PATIENT-LVL IV: CPT | Mod: PBBFAC,,, | Performed by: DERMATOLOGY

## 2022-02-15 PROCEDURE — 1160F PR REVIEW ALL MEDS BY PRESCRIBER/CLIN PHARMACIST DOCUMENTED: ICD-10-PCS | Mod: CPTII,S$GLB,, | Performed by: DERMATOLOGY

## 2022-02-15 PROCEDURE — 88342 IMHCHEM/IMCYTCHM 1ST ANTB: CPT | Mod: 26,,, | Performed by: PATHOLOGY

## 2022-02-15 PROCEDURE — 3008F BODY MASS INDEX DOCD: CPT | Mod: CPTII,S$GLB,, | Performed by: DERMATOLOGY

## 2022-02-15 PROCEDURE — 88342 IMHCHEM/IMCYTCHM 1ST ANTB: CPT | Performed by: PATHOLOGY

## 2022-02-15 PROCEDURE — 88342 CHG IMMUNOCYTOCHEMISTRY: ICD-10-PCS | Mod: 26,,, | Performed by: PATHOLOGY

## 2022-02-15 PROCEDURE — 17110 DESTRUCTION B9 LES UP TO 14: CPT | Mod: 59,S$GLB,, | Performed by: DERMATOLOGY

## 2022-02-15 PROCEDURE — 99999 PR PBB SHADOW E&M-EST. PATIENT-LVL IV: ICD-10-PCS | Mod: PBBFAC,,, | Performed by: DERMATOLOGY

## 2022-02-15 PROCEDURE — 88305 TISSUE EXAM BY PATHOLOGIST: ICD-10-PCS | Mod: 26,,, | Performed by: PATHOLOGY

## 2022-02-15 PROCEDURE — 11102 PR TANGENTIAL BIOPSY, SKIN, SINGLE LESION: ICD-10-PCS | Mod: S$GLB,,, | Performed by: DERMATOLOGY

## 2022-02-15 PROCEDURE — 1159F MED LIST DOCD IN RCRD: CPT | Mod: CPTII,S$GLB,, | Performed by: DERMATOLOGY

## 2022-02-15 PROCEDURE — 88305 TISSUE EXAM BY PATHOLOGIST: CPT | Performed by: PATHOLOGY

## 2022-02-15 PROCEDURE — 88305 TISSUE EXAM BY PATHOLOGIST: CPT | Mod: 26,,, | Performed by: PATHOLOGY

## 2022-02-15 PROCEDURE — 99213 OFFICE O/P EST LOW 20 MIN: CPT | Mod: 25,S$GLB,, | Performed by: DERMATOLOGY

## 2022-02-15 PROCEDURE — 17110 PR DESTRUCTION BENIGN LESIONS UP TO 14: ICD-10-PCS | Mod: 59,S$GLB,, | Performed by: DERMATOLOGY

## 2022-02-15 PROCEDURE — 3008F PR BODY MASS INDEX (BMI) DOCUMENTED: ICD-10-PCS | Mod: CPTII,S$GLB,, | Performed by: DERMATOLOGY

## 2022-02-15 PROCEDURE — 11103 TANGNTL BX SKIN EA SEP/ADDL: CPT | Mod: S$GLB,,, | Performed by: DERMATOLOGY

## 2022-02-15 PROCEDURE — 11102 TANGNTL BX SKIN SINGLE LES: CPT | Mod: S$GLB,,, | Performed by: DERMATOLOGY

## 2022-02-15 NOTE — PATIENT INSTRUCTIONS
Shave Biopsy Wound Care    Your doctor has performed a shave biopsy today.  A band aid and vaseline ointment has been placed over the site.  This should remain in place for 24 hours.  It is recommended that you keep the area dry for the first 24 hours.  After 24 hours, you may remove the band aid and wash the area with warm soap and water and apply Vaseline jelly.  Many patients prefer to use Neosporin or Bacitracin ointment.  This is acceptable; however, know that you can develop an allergy to this medication even if you have used it safely for years.  It is important to keep the area moist.  Letting it dry out and get air slows healing time, and will worsen the scar.  Band aid is optional after first 24 hours.      If you notice increasing redness, tenderness, pain, or yellow drainage at the biopsy site, please notify your doctor.  These are signs of an infection.    If your biopsy site is bleeding, apply firm pressure for 15 minutes straight.  Repeat for another 15 minutes, if it is still bleeding.   If the surgical site continues to bleed, then please contact your doctor.       [facility]  [facility]  [address] [place] [address], [address] [id number]  Dept: [phone]

## 2022-02-15 NOTE — PROGRESS NOTES
Subjective:       Patient ID:  Khadar Lorenzo is a 46 y.o. male who presents for   Chief Complaint   Patient presents with    Skin Check     UBSE     LOV: 5/10/19 - atypical nevi, benign nevi, h/o dysplastic nevus    Skin Check - UBSE    C/o spots on the right flank/ lower back  Raised, getting caught on clothing  No treatment    Derm Hx:  Intense sun exposure in childhood  H/o lesion excised on back ([facility]), unsure of path, thinks may have been skin cancer, 10+ years ago    Current Outpatient Medications:     cyclobenzaprine (FLEXERIL) 10 MG tablet, Take 10 mg by mouth 3 (three) times daily as needed for Muscle spasms., Disp: , Rfl:     fenofibrate 160 MG Tab, TAKE 1 TABLET DAILY, Disp: 90 tablet, Rfl: 1    levothyroxine (SYNTHROID) 75 MCG tablet, TAKE 1 TABLET BEFORE BREAKFAST, Disp: 90 tablet, Rfl: 1    OLUX-E 0.05 % topical foam, Apply 0.05 % topically continuous prn., Disp: , Rfl:     omeprazole (PRILOSEC) 40 MG capsule, TAKE 1 CAPSULE DAILY, Disp: 90 capsule, Rfl: 1    ondansetron (ZOFRAN-ODT) 8 MG TbDL, Take 1 tablet (8 mg total) by mouth every 8 (eight) hours as needed (Nausea)., Disp: 3 tablet, Rfl: 0    oxyCODONE (ROXICODONE) 5 MG immediate release tablet, Take 1 tablet (5 mg total) by mouth every 6 (six) hours as needed for Pain., Disp: 14 tablet, Rfl: 0    tadalafiL (CIALIS) 20 MG Tab, Take 1/2 to 1 tablet every 3 days as needed., Disp: 6 tablet, Rfl: 12    tadalafiL (CIALIS) 5 MG tablet, TAKE ONE TABLET (5 MG TOTAL) BY MOUTH ONCE DAILY, Disp: 30 tablet, Rfl: 6    testosterone cypionate (DEPOTESTOTERONE CYPIONATE) 200 mg/mL injection, INJECT 1 mL INTRAMUSCULARLY EVERY 14 DAYS, Disp: 10 mL, Rfl: 2        Review of Systems   Constitutional: Negative for fever, chills and fatigue.   Skin: Positive for wears hat. Negative for daily sunscreen use and activity-related sunscreen use.   Hematologic/Lymphatic: Does not bruise/bleed easily.        Objective:    Physical Exam    Constitutional: He appears well-developed and well-nourished. No distress.   Neurological: He is alert and oriented to person, place, and time. He is not disoriented.   Psychiatric: He has a normal mood and affect.   Skin:   Areas Examined (abnormalities noted in diagram):   Scalp / Hair Palpated and Inspected  Head / Face Inspection Performed  Neck Inspection Performed  Chest / Axilla Inspection Performed  Abdomen Inspection Performed  Back Inspection Performed  RUE Inspected  LUE Inspection Performed  Nails and Digits Inspection Performed              Diagram Legend     Erythematous scaling macule/papule c/w actinic keratosis       Vascular papule c/w angioma      Pigmented verrucoid papule/plaque c/w seborrheic keratosis      Yellow umbilicated papule c/w sebaceous hyperplasia      Irregularly shaped tan macule c/w lentigo     1-2 mm smooth white papules consistent with Milia      Movable subcutaneous cyst with punctum c/w epidermal inclusion cyst      Subcutaneous movable cyst c/w pilar cyst      Firm pink to brown papule c/w dermatofibroma      Pedunculated fleshy papule(s) c/w skin tag(s)      Evenly pigmented macule c/w junctional nevus     Mildly variegated pigmented, slightly irregular-bordered macule c/w mildly atypical nevus      Flesh colored to evenly pigmented papule c/w intradermal nevus       Pink pearly papule/plaque c/w basal cell carcinoma      Erythematous hyperkeratotic cursted plaque c/w SCC      Surgical scar with no sign of skin cancer recurrence      Open and closed comedones      Inflammatory papules and pustules      Verrucoid papule consistent consistent with wart     Erythematous eczematous patches and plaques     Dystrophic onycholytic nail with subungual debris c/w onychomycosis     Umbilicated papule    Erythematous-base heme-crusted tan verrucoid plaque consistent with inflamed seborrheic keratosis     Erythematous Silvery Scaling Plaque c/w Psoriasis     See  annotation                  Assessment / Plan:      Pathology Orders:     Normal Orders This Visit    Specimen to Pathology, Dermatology     Questions:    Procedure Type: Dermatology and skin neoplasms    Number of Specimens: 2    ------------------------: -------------------------    Spec 1 Procedure: Biopsy    Spec 1 Clinical Impression: r/o DN vs MIS    Spec 1 Source: left upper back    ------------------------: -------------------------    Spec 2 Procedure: Biopsy    Spec 2 Clinical Impression: r/o DN vs MIS    Spec 2 Source: left lower back    Release to patient:         Neoplasm of uncertain behavior of skin  -     Specimen to Pathology, Dermatology  Shave biopsy procedure note:x2    Shave biopsy performed after verbal consent including risk of infection, scar, recurrence, need for additional treatment of site. Area prepped with alcohol, anesthetized with approximately 1.0cc of 1% lidocaine with epinephrine. Lesional tissue shaved with razor blade. Hemostasis achieved with application of aluminum chloride followed by hyfrecation. No complications. Dressing applied. Wound care explained.    Multiple benign nevi  Multiple atypical nevi  Careful dermoscopy evaluation of nevi performed with 2 identified as needing biopsy today  Monitor for new mole or moles that are becoming bigger, darker, irritated, or developing irregular borders.     History of dysplastic nevus  Area of previous DN examined. Site well healed with no signs of recurrence.  Upper body skin examination performed today including at least 9 points as noted in physical examination. 2 lesions suspicious for malignancy noted.    Inflamed skin tag  Waistline, catch in clothing, requests tx  Verbal consent obtained. 2 lesions removed with scissor snip removal after anesthesia with 1% lidocaine with epinephrine. Hemostasis achieved with aluminum chloride and hyfrecation. No complications.    Patient instructed in importance in daily broad spectrum sun  protection of at least spf 30. Mineral sunscreen ingredients preferred (Zinc +/- Titanium) and can be found OTC.   Recommend Elta MD for daily use on face and neck.  Patient encouraged to wear hat for all outdoor exposure.   Also discussed sun avoidance and use of protective clothing.             Follow up in about 1 year (around 2/15/2023), or if symptoms worsen or fail to improve.

## 2022-02-22 LAB
FINAL PATHOLOGIC DIAGNOSIS: NORMAL
GROSS: NORMAL
Lab: NORMAL
MICROSCOPIC EXAM: NORMAL

## 2022-02-23 ENCOUNTER — TELEPHONE (OUTPATIENT)
Dept: DERMATOLOGY | Facility: CLINIC | Age: 47
End: 2022-02-23
Payer: COMMERCIAL

## 2022-02-23 NOTE — TELEPHONE ENCOUNTER
Returned call to patient, see separate encounter.     ----- Message from Ashley Capellan sent at 2/23/2022 10:15 AM CST -----  Type: Needs Medical Advice  Who Called:  Pt  Best Call Back Number: [phone]  Additional Information: Pt returning call to discuss results--please advise--Thank you

## 2022-03-09 ENCOUNTER — PATIENT MESSAGE (OUTPATIENT)
Dept: ORTHOPEDICS | Facility: CLINIC | Age: 47
End: 2022-03-09
Payer: COMMERCIAL

## 2022-03-15 ENCOUNTER — PROCEDURE VISIT (OUTPATIENT)
Dept: DERMATOLOGY | Facility: CLINIC | Age: 47
End: 2022-03-15
Payer: COMMERCIAL

## 2022-03-15 DIAGNOSIS — L30.9 DERMATITIS: ICD-10-CM

## 2022-03-15 DIAGNOSIS — D48.5 NEOPLASM OF UNCERTAIN BEHAVIOR OF SKIN: Primary | ICD-10-CM

## 2022-03-15 PROCEDURE — 88305 TISSUE EXAM BY PATHOLOGIST: CPT | Performed by: PATHOLOGY

## 2022-03-15 PROCEDURE — 11402 PR EXC SKIN BENIG 1.1-2 CM TRUNK,ARM,LEG: ICD-10-PCS | Mod: 51,S$GLB,, | Performed by: DERMATOLOGY

## 2022-03-15 PROCEDURE — 11402 EXC TR-EXT B9+MARG 1.1-2 CM: CPT | Mod: 51,S$GLB,, | Performed by: DERMATOLOGY

## 2022-03-15 PROCEDURE — 88305 TISSUE EXAM BY PATHOLOGIST: CPT | Mod: 26,,, | Performed by: PATHOLOGY

## 2022-03-15 PROCEDURE — 99499 UNLISTED E&M SERVICE: CPT | Mod: S$GLB,,, | Performed by: DERMATOLOGY

## 2022-03-15 PROCEDURE — 99499 NO LOS: ICD-10-PCS | Mod: S$GLB,,, | Performed by: DERMATOLOGY

## 2022-03-15 PROCEDURE — 12032 INTMD RPR S/A/T/EXT 2.6-7.5: CPT | Mod: S$GLB,,, | Performed by: DERMATOLOGY

## 2022-03-15 PROCEDURE — 12032 PR LAYR CLOS WND TRUNK,ARM,LEG 2.6-7.5 CM: ICD-10-PCS | Mod: S$GLB,,, | Performed by: DERMATOLOGY

## 2022-03-15 PROCEDURE — 88305 TISSUE EXAM BY PATHOLOGIST: ICD-10-PCS | Mod: 26,,, | Performed by: PATHOLOGY

## 2022-03-15 NOTE — PROGRESS NOTES
Subjective:       Patient ID:  Khadar Lorenzo is a 46 y.o. male who presents for No chief complaint on file.    Pt here for definitive excision of severely DN on lower back bx 2/15/2022  Upper back mod DN will be reexamined.  Feeling well today.   Denies pacemaker, defibrillator or blood thinners.   No additional cutaneous complaints.     1. Skin, left upper back, shave biopsy:   -COMPOUND MELANOCYTIC NEVUS WITH MODERATE ARCHITECTURAL AND CYTOLOGIC ATYPIA   -THE LESION APPEARS EXCISED IN THE PLANES OF SECTION EXAMINED   This lesion is atypical and further treatment may be required. You will be   contacted by your provider's office.   2. Skin, left lower back, shave biopsy:   -COMPOUND MELANOCYTIC NEVUS WITH SEVERE ARCHITECTURAL AND CYTOLOGIC ATYPIA   -THE LESION APPEARS EXCISED IN THE PLANES OF SECTION EXAMINED   This lesion is atypical and further treatment may be required. You will be   contacted by your provider's office.      Review of Systems   Constitutional: Negative for fever and chills.        Objective:    Physical Exam   Constitutional: He appears well-developed and well-nourished. No distress.   Neurological: He is alert and oriented to person, place, and time. He is not disoriented.   Psychiatric: He has a normal mood and affect.   Skin:                 Diagram Legend     Erythematous scaling macule/papule c/w actinic keratosis       Vascular papule c/w angioma      Pigmented verrucoid papule/plaque c/w seborrheic keratosis      Yellow umbilicated papule c/w sebaceous hyperplasia      Irregularly shaped tan macule c/w lentigo     1-2 mm smooth white papules consistent with Milia      Movable subcutaneous cyst with punctum c/w epidermal inclusion cyst      Subcutaneous movable cyst c/w pilar cyst      Firm pink to brown papule c/w dermatofibroma      Pedunculated fleshy papule(s) c/w skin tag(s)      Evenly pigmented macule c/w junctional nevus     Mildly variegated pigmented, slightly  irregular-bordered macule c/w mildly atypical nevus      Flesh colored to evenly pigmented papule c/w intradermal nevus       Pink pearly papule/plaque c/w basal cell carcinoma      Erythematous hyperkeratotic cursted plaque c/w SCC      Surgical scar with no sign of skin cancer recurrence      Open and closed comedones      Inflammatory papules and pustules      Verrucoid papule consistent consistent with wart     Erythematous eczematous patches and plaques     Dystrophic onycholytic nail with subungual debris c/w onychomycosis     Umbilicated papule    Erythematous-base heme-crusted tan verrucoid plaque consistent with inflamed seborrheic keratosis     Erythematous Silvery Scaling Plaque c/w Psoriasis     See annotation              Assessment / Plan:      Pathology Orders:     Normal Orders This Visit    Specimen to Pathology, Dermatology     Comments:    Number of Specimens:->1  ------------------------->-------------------------  Spec 1 Procedure:->Excision >2cm  Spec 1 Clinical Impression:->severely DN reexcision, check  margins  Spec 1 Source:->left lower back    Questions:    Procedure Type: Dermatology and skin neoplasms    Number of Specimens: 1    ------------------------: -------------------------    Spec 1 Procedure: Excision >2cm    Spec 1 Clinical Impression: severely DN reexcision, check margins    Spec 1 Source: left lower back    Release to patient:         Neoplasm of uncertain behavior of skin  -     Specimen to Pathology, Dermatology    PROCEDURE: Elliptical excision with intermediate layered repair in order to decrease dead space, decrease tension and close large gap.    ANESTHETIC: 15 cc 1% Xylocaine with Epinephrine 1:100,000, buffered    SURGEON: Yenni Betancourt MD  ASSISTANTS: Neelima Fatima RN, Louise Higgins LPN,  Shari Rowan MA    PREOPERATIVE DIAGNOSIS:  Severely Atypical Nevus    POSTOPERATIVE DIAGNOSIS:  Same as preoperative diagnosis    PATHOLOGIC DIAGNOSIS: Pending    LOCATION: left  lower back    INITIAL LESION SIZE: 1 cm    EXCISED DIAMETER: 1.6 cm    PREPARATION: The diagnosis, procedure, alternatives, benefits and risks, including but not limited to: infection, bleeding/bruising, drug reactions, pain, scar or cosmetic defect, local sensation disturbances, wound dehiscence (separation of wound edges after sutures removed) and/or recurrence of present condition were explained to the patient. The patient elected to proceed.  Patient's identity was verified using 2 patient identifiers and the side and site was verified.  Time out period with surgeon, assistant and patient in surgical suite was taken.    PROCEDURE: The location noted above was prepped and draped in the usual sterile fashion. The area was anesthetized with intradermal buffered xylocaine. Lesional tissue was carefully marked with at least 3 mm margins of clinically normal skin in all directions. A fusiform elliptical excision was done with #15 blade carried down completely through the dermis into the subcutaneous tissues to the level of the subcutaneous fat, and dissection was carried out in that plane.  Electrocoagulation was used to obtain hemostasis. Blood loss was minimal. The wound was then approximated in a layered fashion with subcutaneous and intradermal sutures of 3.0 Monocryl, approximately 5 in number, and the wound was then superficially closed with simple interrupted sutures of 3.0 Prolene.    The patient tolerated the procedure well.    The area was cleaned and dressed appropriately and the patient was given wound care instructions, as well as an appointment for follow-up evaluation.    LENGTH OF REPAIR: 5 cm    Dermatitis  -     triamcinolone acetonide 0.1% (KENALOG) 0.1 % cream; AAA bid  Dispense: 80 g; Refill: 3  Mild, abdomen, surrounds surgical site (destruction), contact to bandage?          Follow up in about 2 weeks (around 3/29/2022) for suture removal.

## 2022-03-15 NOTE — PATIENT INSTRUCTIONS
Surgery Wound Care    Your doctor has performed an excision today.  A bandage and vaseline ointment has been placed over the site.  This should remain in place for 24 hours.  It is recommended that you keep the area dry for the first 24 hours.  After 24 hours, you may remove the band aid and wash the area with warm soap and water and apply Vaseline jelly or aquaphore.  Many patients prefer to use Neosporin or Bacitracin ointment.  This is acceptable; however know that you can develop an allergy to this medication even if you have used it safely for years.  It is important to keep the area moist.  Letting it dry out and get air slows healing time, will worsen the scar, and make it more difficult to remove the stitches if they were placed.        If you notice increasing redness, tenderness, pain, or yellow drainage at the biopsy or surgical site, please notify your doctor.  These are signs of an infection.    If your biopsy/surgical site is bleeding, apply firm pressure for 15 minutes straight.  Repeat for another 15 minutes, if it is still bleeding.   If the surgical site continues to bleed, then please contact your doctor.     [facility]  [facility]  [address] [place] [address]  Dept: [phone]

## 2022-03-16 ENCOUNTER — OCCUPATIONAL HEALTH (OUTPATIENT)
Dept: URGENT CARE | Facility: CLINIC | Age: 47
End: 2022-03-16

## 2022-03-16 DIAGNOSIS — Z02.83 ENCOUNTER FOR DRUG SCREENING: ICD-10-CM

## 2022-03-16 DIAGNOSIS — Z76.89 RETURN TO WORK EVALUATION: Primary | ICD-10-CM

## 2022-03-16 PROCEDURE — 99499 UNLISTED E&M SERVICE: CPT | Mod: S$GLB,,, | Performed by: EMERGENCY MEDICINE

## 2022-03-16 PROCEDURE — 99499 RETURN TO WORK EXAM: BASIC: ICD-10-PCS | Mod: S$GLB,,, | Performed by: EMERGENCY MEDICINE

## 2022-03-16 PROCEDURE — 80305 OOH COLLECTION ONLY DRUG SCREEN: ICD-10-PCS | Mod: S$GLB,,, | Performed by: EMERGENCY MEDICINE

## 2022-03-16 PROCEDURE — 80305 DRUG TEST PRSMV DIR OPT OBS: CPT | Mod: S$GLB,,, | Performed by: EMERGENCY MEDICINE

## 2022-03-16 RX ORDER — TRIAMCINOLONE ACETONIDE 1 MG/G
CREAM TOPICAL
Qty: 80 G | Refills: 3 | Status: SHIPPED | OUTPATIENT
Start: 2022-03-16 | End: 2022-05-10

## 2022-03-17 ENCOUNTER — PATIENT MESSAGE (OUTPATIENT)
Dept: FAMILY MEDICINE | Facility: CLINIC | Age: 47
End: 2022-03-17
Payer: COMMERCIAL

## 2022-03-17 ENCOUNTER — TELEPHONE (OUTPATIENT)
Dept: FAMILY MEDICINE | Facility: CLINIC | Age: 47
End: 2022-03-17
Payer: COMMERCIAL

## 2022-03-17 NOTE — TELEPHONE ENCOUNTER
Called pt and left him a message to please return my call so we can reschedule his upcoming appt with Talita.

## 2022-03-21 ENCOUNTER — HOSPITAL ENCOUNTER (OUTPATIENT)
Dept: RADIOLOGY | Facility: HOSPITAL | Age: 47
Discharge: HOME | End: 2022-03-21
Attending: ORTHOPAEDIC SURGERY
Payer: COMMERCIAL

## 2022-03-21 ENCOUNTER — OFFICE VISIT (OUTPATIENT)
Dept: ORTHOPEDICS | Facility: CLINIC | Age: 47
End: 2022-03-21
Payer: COMMERCIAL

## 2022-03-21 VITALS
HEART RATE: 58 BPM | BODY MASS INDEX: 34.22 KG/M2 | HEIGHT: 67 IN | DIASTOLIC BLOOD PRESSURE: 82 MMHG | WEIGHT: 218.06 LBS | SYSTOLIC BLOOD PRESSURE: 121 MMHG

## 2022-03-21 DIAGNOSIS — S46.211D TRAUMATIC RUPTURE OF RIGHT DISTAL BICEPS TENDON, SUBSEQUENT ENCOUNTER: Primary | ICD-10-CM

## 2022-03-21 DIAGNOSIS — S46.211D TRAUMATIC RUPTURE OF RIGHT DISTAL BICEPS TENDON, SUBSEQUENT ENCOUNTER: ICD-10-CM

## 2022-03-21 PROCEDURE — 3074F SYST BP LT 130 MM HG: CPT | Mod: CPTII,S$GLB,, | Performed by: ORTHOPAEDIC SURGERY

## 2022-03-21 PROCEDURE — 99024 PR POST-OP FOLLOW-UP VISIT: ICD-10-PCS | Mod: S$GLB,,, | Performed by: ORTHOPAEDIC SURGERY

## 2022-03-21 PROCEDURE — 73070 XR ELBOW 2 VIEWS RIGHT: ICD-10-PCS | Mod: 26,RT,, | Performed by: RADIOLOGY

## 2022-03-21 PROCEDURE — 3008F BODY MASS INDEX DOCD: CPT | Mod: CPTII,S$GLB,, | Performed by: ORTHOPAEDIC SURGERY

## 2022-03-21 PROCEDURE — 3079F PR MOST RECENT DIASTOLIC BLOOD PRESSURE 80-89 MM HG: ICD-10-PCS | Mod: CPTII,S$GLB,, | Performed by: ORTHOPAEDIC SURGERY

## 2022-03-21 PROCEDURE — 99999 PR PBB SHADOW E&M-EST. PATIENT-LVL III: CPT | Mod: PBBFAC,,, | Performed by: ORTHOPAEDIC SURGERY

## 2022-03-21 PROCEDURE — 1159F MED LIST DOCD IN RCRD: CPT | Mod: CPTII,S$GLB,, | Performed by: ORTHOPAEDIC SURGERY

## 2022-03-21 PROCEDURE — 1159F PR MEDICATION LIST DOCUMENTED IN MEDICAL RECORD: ICD-10-PCS | Mod: CPTII,S$GLB,, | Performed by: ORTHOPAEDIC SURGERY

## 2022-03-21 PROCEDURE — 99999 PR PBB SHADOW E&M-EST. PATIENT-LVL III: ICD-10-PCS | Mod: PBBFAC,,, | Performed by: ORTHOPAEDIC SURGERY

## 2022-03-21 PROCEDURE — 3074F PR MOST RECENT SYSTOLIC BLOOD PRESSURE < 130 MM HG: ICD-10-PCS | Mod: CPTII,S$GLB,, | Performed by: ORTHOPAEDIC SURGERY

## 2022-03-21 PROCEDURE — 73070 X-RAY EXAM OF ELBOW: CPT | Mod: 26,RT,, | Performed by: RADIOLOGY

## 2022-03-21 PROCEDURE — 3008F PR BODY MASS INDEX (BMI) DOCUMENTED: ICD-10-PCS | Mod: CPTII,S$GLB,, | Performed by: ORTHOPAEDIC SURGERY

## 2022-03-21 PROCEDURE — 3079F DIAST BP 80-89 MM HG: CPT | Mod: CPTII,S$GLB,, | Performed by: ORTHOPAEDIC SURGERY

## 2022-03-21 PROCEDURE — 99024 POSTOP FOLLOW-UP VISIT: CPT | Mod: S$GLB,,, | Performed by: ORTHOPAEDIC SURGERY

## 2022-03-21 PROCEDURE — 73070 X-RAY EXAM OF ELBOW: CPT | Mod: TC,PO,RT

## 2022-03-21 NOTE — PROGRESS NOTES
Mr Power returns to clinic today.  He is 3 months status post right distal biceps tendon repair.  He is overall doing well.  He has regained motion.  He has no major complaints.    Physical exam:  Examination the right elbow and forearm reveals the incision is well healed.  There is no edema.  He has a well palpable distal biceps tendon.  Range of motion of the elbow is 0-150 degrees with full pronation and supination.  Is very minimal paresthesias just distal to the wound but the remainder of his sensation is intact throughout the upper extremity    Radiology:  X-rays of the right elbow were taken in clinic today.  The films reviewed by me.    Assessment:  Status post right distal biceps tendon repair    Plan:    1. Will send him to occupational therapy to create a home program for gentle strengthening to increase strengthening to full weight-bearing over the next 3 months    2. He will be limited to proximally 20 lb of lifting to the right upper extremity    3. Will follow up with me in 3 months for final evaluation

## 2022-03-25 ENCOUNTER — CLINICAL SUPPORT (OUTPATIENT)
Dept: DERMATOLOGY | Facility: CLINIC | Age: 47
End: 2022-03-25
Payer: COMMERCIAL

## 2022-03-25 ENCOUNTER — CLINICAL SUPPORT (OUTPATIENT)
Dept: REHABILITATION | Facility: HOSPITAL | Age: 47
End: 2022-03-25
Attending: ORTHOPAEDIC SURGERY
Payer: COMMERCIAL

## 2022-03-25 DIAGNOSIS — M25.621 STIFFNESS OF ELBOW JOINT, RIGHT: ICD-10-CM

## 2022-03-25 DIAGNOSIS — Z48.02 VISIT FOR SUTURE REMOVAL: Primary | ICD-10-CM

## 2022-03-25 DIAGNOSIS — M62.81 MUSCLE WEAKNESS OF RIGHT ARM: ICD-10-CM

## 2022-03-25 DIAGNOSIS — M79.601 PAIN, ARM, RIGHT: Primary | ICD-10-CM

## 2022-03-25 DIAGNOSIS — Z74.09 IMPAIRED MOBILITY AND ADLS: ICD-10-CM

## 2022-03-25 DIAGNOSIS — Z78.9 IMPAIRED MOBILITY AND ADLS: ICD-10-CM

## 2022-03-25 LAB
FINAL PATHOLOGIC DIAGNOSIS: NORMAL
GROSS: NORMAL
Lab: NORMAL
MICROSCOPIC EXAM: NORMAL

## 2022-03-25 PROCEDURE — 99024 POSTOP FOLLOW-UP VISIT: CPT | Mod: S$GLB,,, | Performed by: DERMATOLOGY

## 2022-03-25 PROCEDURE — 99999 PR PBB SHADOW E&M-EST. PATIENT-LVL I: CPT | Mod: PBBFAC,,,

## 2022-03-25 PROCEDURE — 97110 THERAPEUTIC EXERCISES: CPT | Mod: PO

## 2022-03-25 PROCEDURE — 99024 PR POST-OP FOLLOW-UP VISIT: ICD-10-PCS | Mod: S$GLB,,, | Performed by: DERMATOLOGY

## 2022-03-25 PROCEDURE — 99999 PR PBB SHADOW E&M-EST. PATIENT-LVL I: ICD-10-PCS | Mod: PBBFAC,,,

## 2022-03-25 NOTE — PLAN OF CARE
Occupational Therapy D/C and Daily Treatment Note     Name: Khadar Jamison [facility]  Clinic Number: [id number]    Therapy Diagnosis:   Encounter Diagnoses   Name Primary?    Pain, arm, right Yes    Muscle weakness of right arm     Stiffness of elbow joint, right     Impaired mobility and ADLs      Physician: Bhavesh Steen MD    Physician orders:  Note     Therapy to the right upper extremity.  Status post distal biceps tendon repair protocol.  Nonweightbearing.  Begin range of motion of full flexion to 60° from full extension.  Increase extension by 10° per week until full extension is reached.     Frequency:  3 times per week     Duration:  6 weeks     Diagnosis:  Status post right distal biceps tendon repair          Additional Rx from 3/31/2022    Note    Please begin therapy to the right upper extremity.  Please teach the patient a progressive strengthening program for the right elbow.  Start with 20 lb maximum to the right upper extremity and increase weight-bearing until he is full weight-bearing at the 6 month postoperative marguerite.     Frequency:  1 visit     Duration:  1 visit     Diagnosis:  Status post right distal biceps tendon repair          Visit  And D/C Date: 3/25/2022    Medical Diagnosis: S46.211D (ICD-10-CM) - Traumatic rupture of right distal biceps tendon, subsequent encounter     Evaluation Date: 1/11/2022  Insurance Authorization period Expiration:  Calendar year  Plan of Care Expiration Period: 6 weeks = 2/21/2022  Next Re-assessment: By poc exp date of 2/21/2022  Date of Return Referring Provider 2/07/2022     Visit # / Visits Authortized:  11 / 20  # No shows 0 / # Cancellations: 0  Time In: 0700  Time Out: 0750  Total Billable Time: 50 minutes     Precautions: Standard and per protocol  Surgery: DATE OF SURGERY: 12/23/2021      PRE-OPERATIVE DIAGNOSIS:  Right distal biceps tendon rupture     POST-OPERATIVE DIAGNOSIS:  Right distal biceps tendon rupture      ANESTHESIA  TYPE:  General with a single-shot supraclavicular block     BLOOD LOSS:  Less than 10 cc     TOURNIQUET TIME:  None     SURGEON: Dr Steen     ASSISTANT: Dr Darion Gilmore Assistant surgeon was present for the entire case and did assist with advancement of the biceps tendon as well as fixation of the biceps tendon to the radius as well as wound closure.     PROCEDURE:  Right distal biceps tendon repair     IMPLANTS:  Ridge distal biceps tendon button x1, Ridge bio interference screw size 7 mm x 1                                         S/P:  approx 3 months    Subjective     Pt reports: No new symptoms or complaints  he was compliant with HEP.   Response to previous treatment: Very good  Functional change: None allowed R UE    Functional Pain Scale Rating 0-10:   0/10 on average  0/10 at best  1-2/10 at worst  Location: R shoulder  Description: Aching, Dull, Burning, Throbbing and Variable,   Aggravating Factors: pressure/touvh wrist/scar and posterior elbow.  Easing Factors: rest    Objective   Quickdash from Eval = 45.45%  Quickdash from D/C = 18.18%    Last measurements from Eval unless otherwise noted below.    MEASUREMENTS  Observation/Inspection/Wound/Incision/Scar  Incision, closed/healed with only mild overall thickening     Sensation: Grossly WNL     Edema:     Moderate:  non pitting RUE.   2/4/2022       Circumferential (in cm) L R   Proximal Wrist Crease 17.7 18.0   MPs NT NT   Long Proximal Phalanx NT NT      AROM Hand: Tip to palm/DPC digits 1-5 (in cm) WNL      AROM Elbow in brace:  2/4/2022  Able to hit stop of -20 degrees with ext.    AROM 3/25/2022            Left              Right   Elbow    Forearm Supination Ext/Flex  0/135°      85 Ext/Flex:  WNL      WNL.         Manual Muscle Test: NT                                       and Pinch Strength: NT at this time due to post operative status.      Special and/or Standardized Tests: NT       KEZIA Whittington received therapeutic  exercises for 45 minutes including:  New progressive strengthening HEP with Green T band, cane, wt bearing as well as dumbells; including Throwers 10 ex to address shoulder level strength (see handout in media) 3-5 x week (with rest days if soreness develops)    Home Exercises and Education Provided     Education provided:   -  HEP with Green T band, cane, wt bearing as well as dumbells; including Throwers 10 ex to address shoulder level strength (see handout in media) 3-5 x week (with rest days if soreness develops)      Written Home Exercises Provided:  HEP with Green T band, cane, wt bearing as well as dumbells; including Throwers 10 ex to address shoulder level strength (see handout in media) 3-5 x week (with rest days if soreness develops)    Exercises were reviewed and Pk was able to demonstrate them prior to the end of the session.  Pk demonstrated good  understanding of the education provided.     See EMR under Media for exercises provided today and/or prior visit.        Assessment       Pt has met all goals, is pleased with progress, and is ready for D/C at this time.    Pk is progressing well towards his goals . Pt continues with good rehab potential.     Pt will continue to benefit from skilled outpatient occupational therapy to address the deficits listed in the problem list on initial evaluation in order to maximize pt's level of independence in the home and community.     Anticipated barriers to occupational therapy: None    Pt's spiritual, cultural and educational needs considered and pt agreeable to plan of care and goals.    Goals:  Short Term Goals: (30 days, 2/10/2022, and/or 10th visit) unless otherwise noted below.  1. Pt will be independent with HEP in 2 visits.MET  2. Pt will report decreased pain to a 2/10 at worst in RUE with ADLs in order to increase function/use of UE. MET  3. Pt will increase AROM R elbow ext to to at least -30 degrees in prep for return to RUE use for ADL.  MET    Short Term Goals: (by poc exp date of 2/21/2022) unless otherwise noted below.   4. Pt will increase AROM R elbow ext to to 0 degrees in prep for return to RUE use for ADL. MET    Long Term Goals: (by discharge)  1. Pt will report decreased pain to 1-2/10 with ADLs to allow for increased function/use of UE. MET  2. Pt will exhibit WNL AROM of  RUE to allow for Independent use of for all ADL/IADL tasks. MET  3. Pt will exhibit  WFL strength RUE to allow a firm grasp during ADL/IADL (cooking utensils, tool use, carrying groceries, steering wheel, etc.) MET  4. Pt will return to PLOF with all ADL/IADL, leisure and job tasks MET for current modified duty RUE.    Plan   Pt for D/C from OT services this date. To to cont HEP as tolerated and will follow up with referring provider for any further tx needs.      HILLARY Turner/SHELBI

## 2022-03-25 NOTE — PROGRESS NOTES
Patient presents for suture removal. The wound is well healed without signs of infection.  The sutures are removed. Steri strips applied. Wound care and activity instructions given.  Return prn.

## 2022-03-27 ENCOUNTER — PATIENT MESSAGE (OUTPATIENT)
Dept: DERMATOLOGY | Facility: CLINIC | Age: 47
End: 2022-03-27
Payer: COMMERCIAL

## 2022-03-30 NOTE — PROGRESS NOTES
We received the pathology results from your surgery. I am pleased to inform you that the atypical mole has been completely excised. No further treatment is needed. Please contact my office if you have any questions.   Thank you,  Dr Betancourt      Skin, left lower back, excision:   -SCAR (POST-SURGICAL)   -NEGATIVE FOR RESIDUAL ATYPICAL MELANOCYTIC NEVUS

## 2022-03-31 ENCOUNTER — APPOINTMENT (OUTPATIENT)
Dept: DERMATOLOGY | Facility: CLINIC | Age: 47
End: 2022-03-31
Payer: COMMERCIAL

## 2022-03-31 DIAGNOSIS — T81.49XA SURGICAL SITE INFECTION: Primary | ICD-10-CM

## 2022-03-31 PROCEDURE — 87186 SC STD MICRODIL/AGAR DIL: CPT | Performed by: DERMATOLOGY

## 2022-03-31 PROCEDURE — 87070 CULTURE OTHR SPECIMN AEROBIC: CPT | Performed by: DERMATOLOGY

## 2022-03-31 PROCEDURE — 87077 CULTURE AEROBIC IDENTIFY: CPT | Performed by: DERMATOLOGY

## 2022-03-31 RX ORDER — MUPIROCIN 20 MG/G
OINTMENT TOPICAL
Qty: 22 G | Refills: 1 | Status: SHIPPED | OUTPATIENT
Start: 2022-03-31 | End: 2023-07-20

## 2022-03-31 RX ORDER — DOXYCYCLINE 100 MG/1
TABLET ORAL
Qty: 14 TABLET | Refills: 0 | Status: SHIPPED | OUTPATIENT
Start: 2022-03-31 | End: 2022-05-02

## 2022-04-04 LAB — BACTERIA SPEC AEROBE CULT: ABNORMAL

## 2022-05-02 ENCOUNTER — PATIENT MESSAGE (OUTPATIENT)
Dept: FAMILY MEDICINE | Facility: CLINIC | Age: 47
End: 2022-05-02
Payer: COMMERCIAL

## 2022-05-02 ENCOUNTER — OFFICE VISIT (OUTPATIENT)
Dept: FAMILY MEDICINE | Facility: CLINIC | Age: 47
End: 2022-05-02
Payer: COMMERCIAL

## 2022-05-02 VITALS
OXYGEN SATURATION: 97 % | HEART RATE: 73 BPM | DIASTOLIC BLOOD PRESSURE: 76 MMHG | WEIGHT: 213.88 LBS | TEMPERATURE: 99 F | BODY MASS INDEX: 33.57 KG/M2 | HEIGHT: 67 IN | SYSTOLIC BLOOD PRESSURE: 132 MMHG | RESPIRATION RATE: 18 BRPM

## 2022-05-02 DIAGNOSIS — E34.9 HYPOTESTOSTERONEMIA: ICD-10-CM

## 2022-05-02 DIAGNOSIS — E66.9 CLASS 1 OBESITY WITH BODY MASS INDEX (BMI) OF 33.0 TO 33.9 IN ADULT, UNSPECIFIED OBESITY TYPE, UNSPECIFIED WHETHER SERIOUS COMORBIDITY PRESENT: ICD-10-CM

## 2022-05-02 DIAGNOSIS — K21.9 GASTROESOPHAGEAL REFLUX DISEASE WITHOUT ESOPHAGITIS: ICD-10-CM

## 2022-05-02 DIAGNOSIS — Z12.5 PROSTATE CANCER SCREENING: ICD-10-CM

## 2022-05-02 DIAGNOSIS — S46.211D TRAUMATIC RUPTURE OF RIGHT DISTAL BICEPS TENDON, SUBSEQUENT ENCOUNTER: ICD-10-CM

## 2022-05-02 DIAGNOSIS — E78.1 HYPERTRIGLYCERIDEMIA: Primary | ICD-10-CM

## 2022-05-02 DIAGNOSIS — E03.8 SECONDARY HYPOTHYROIDISM: ICD-10-CM

## 2022-05-02 PROCEDURE — 1160F PR REVIEW ALL MEDS BY PRESCRIBER/CLIN PHARMACIST DOCUMENTED: ICD-10-PCS | Mod: CPTII,S$GLB,, | Performed by: NURSE PRACTITIONER

## 2022-05-02 PROCEDURE — 99214 OFFICE O/P EST MOD 30 MIN: CPT | Mod: S$GLB,,, | Performed by: NURSE PRACTITIONER

## 2022-05-02 PROCEDURE — 1160F RVW MEDS BY RX/DR IN RCRD: CPT | Mod: CPTII,S$GLB,, | Performed by: NURSE PRACTITIONER

## 2022-05-02 PROCEDURE — 1159F MED LIST DOCD IN RCRD: CPT | Mod: CPTII,S$GLB,, | Performed by: NURSE PRACTITIONER

## 2022-05-02 PROCEDURE — 3008F BODY MASS INDEX DOCD: CPT | Mod: CPTII,S$GLB,, | Performed by: NURSE PRACTITIONER

## 2022-05-02 PROCEDURE — 3075F SYST BP GE 130 - 139MM HG: CPT | Mod: CPTII,S$GLB,, | Performed by: NURSE PRACTITIONER

## 2022-05-02 PROCEDURE — 99214 PR OFFICE/OUTPT VISIT, EST, LEVL IV, 30-39 MIN: ICD-10-PCS | Mod: S$GLB,,, | Performed by: NURSE PRACTITIONER

## 2022-05-02 PROCEDURE — 3075F PR MOST RECENT SYSTOLIC BLOOD PRESS GE 130-139MM HG: ICD-10-PCS | Mod: CPTII,S$GLB,, | Performed by: NURSE PRACTITIONER

## 2022-05-02 PROCEDURE — 3078F DIAST BP <80 MM HG: CPT | Mod: CPTII,S$GLB,, | Performed by: NURSE PRACTITIONER

## 2022-05-02 PROCEDURE — 1159F PR MEDICATION LIST DOCUMENTED IN MEDICAL RECORD: ICD-10-PCS | Mod: CPTII,S$GLB,, | Performed by: NURSE PRACTITIONER

## 2022-05-02 PROCEDURE — 3008F PR BODY MASS INDEX (BMI) DOCUMENTED: ICD-10-PCS | Mod: CPTII,S$GLB,, | Performed by: NURSE PRACTITIONER

## 2022-05-02 PROCEDURE — 3078F PR MOST RECENT DIASTOLIC BLOOD PRESSURE < 80 MM HG: ICD-10-PCS | Mod: CPTII,S$GLB,, | Performed by: NURSE PRACTITIONER

## 2022-05-02 NOTE — Clinical Note
Uma, His chart shows that he had Colonoscopy on 6/28/19. This report is not this patient. Same name, different birth date. Different PCP.  I am sending a letter to Dr. Chau to correct. I told patient to follow up with them and his insurance. Just wanted to let you know, see if there was anything you could do to get corrected and off his record.

## 2022-05-02 NOTE — PROGRESS NOTES
Subjective:       Patient ID: Khadar Lorenzo is a 46 y.o. male.    Chief Complaint: Follow up    HPI here for 6 month follow up. States he is doing well. He is still in recovery for his traumatic bicep tear. He had surgery and then physical therapy. He is still on restriction for how much weight he can . He has follow up with Dr. Steen the end of June and hopefully will be released to full duty at that time. He states the area is healing well. No pain.    He is due for labs. Scheduled for follow up with Urology in June, will schedule labs before that visit so he can do them all at one time.     He denies any specific concerns.     He is due for Colon cancer screening. His chart shows that he had colonoscopy in 2019: this is not accurate. The test in his records does not belong to him. Same name, different birth dates and PCP.  He states he has never had Colonoscopy done. Will work to get this resolved and billed/placed with the appropriate patient.     See ROS.    The following portion of the patients history was reviewed and updated as appropriate: allergies, current medications, past medical and surgical history. Past social history and problem list reviewed. Family PMH and Past social history reviewed. Tobacco, Illicit drug use reviewed.      Review of patient's allergies indicates:  No Known Allergies      Current Outpatient Medications:     cyclobenzaprine (FLEXERIL) 10 MG tablet, Take 10 mg by mouth 3 (three) times daily as needed for Muscle spasms., Disp: , Rfl:     fenofibrate 160 MG Tab, TAKE 1 TABLET DAILY, Disp: 90 tablet, Rfl: 3    levothyroxine (SYNTHROID) 75 MCG tablet, TAKE 1 TABLET BEFORE BREAKFAST, Disp: 90 tablet, Rfl: 3    mupirocin (BACTROBAN) 2 % ointment, Apply to surgical site BID, Disp: 22 g, Rfl: 1    OLUX-E 0.05 % topical foam, Apply 0.05 % topically continuous prn., Disp: , Rfl:     omeprazole (PRILOSEC) 40 MG capsule, TAKE 1 CAPSULE DAILY, Disp: 90 capsule, Rfl:  3    ondansetron (ZOFRAN-ODT) 8 MG TbDL, Take 1 tablet (8 mg total) by mouth every 8 (eight) hours as needed (Nausea)., Disp: 3 tablet, Rfl: 0    tadalafiL (CIALIS) 20 MG Tab, TAKE 1/2 TO 1 TABLET BY MOUTH EVERY 3 DAYS AS NEEDED, Disp: 6 tablet, Rfl: 12    testosterone cypionate (DEPOTESTOTERONE CYPIONATE) 200 mg/mL injection, INJECT 1 mL INTRAMUSCULARLY EVERY 14 DAYS, Disp: 10 mL, Rfl: 2    triamcinolone acetonide 0.1% (KENALOG) 0.1 % cream, AAA bid, Disp: 80 g, Rfl: 3    Past Medical History:   Diagnosis Date    Anxiety     Depression     GERD (gastroesophageal reflux disease)     Hyperlipidemia     Hypotestosteronemia     Hypothyroidism     Nephrolithiasis     2 episodes    Obesity     Snoring     Syncope and collapse        Past Surgical History:   Procedure Laterality Date    CYST REMOVAL      left wrist    FRACTURE SURGERY      screw in right thumb    testopel      by Dr. Martini in the past for his hypogonadism.    TONSILLECTOMY, ADENOIDECTOMY, BILATERAL MYRINGOTOMY AND TUBES         Social History     Socioeconomic History    Marital status:    Occupational History    Occupation: [employer]   Tobacco Use    Smoking status: Former Smoker    Smokeless tobacco: Current User     Types: Chew    Tobacco comment: chew everyday, 0.25 can    Substance and Sexual Activity    Alcohol use: Yes     Alcohol/week: 1.0 standard drink     Types: 1 Cans of beer per week     Comment: socially    Drug use: No    Sexual activity: Yes     Partners: Female       Review of Systems   Constitutional: Negative for activity change, appetite change, fatigue and fever.   HENT: Negative for congestion, rhinorrhea and trouble swallowing.    Eyes: Negative for visual disturbance.   Respiratory: Negative for cough, chest tightness, shortness of breath and wheezing.    Cardiovascular: Negative for chest pain, palpitations and leg swelling.   Gastrointestinal: Negative for abdominal pain, blood in  "stool, diarrhea, nausea and vomiting.   Genitourinary: Negative for difficulty urinating.   Musculoskeletal: Negative for arthralgias, back pain and gait problem.   Skin: Negative for rash.   Neurological: Negative for dizziness, weakness and headaches.   Hematological: Negative for adenopathy. Does not bruise/bleed easily.   Psychiatric/Behavioral: Negative for decreased concentration, dysphoric mood, self-injury, sleep disturbance and suicidal ideas. The patient is not nervous/anxious.        Objective:      /76   Pulse 73   Temp 98.6 °F (37 °C)   Resp 18   Ht 5' 7" (1.702 m)   Wt 97 kg (213 lb 13.5 oz)   SpO2 97%   BMI 33.49 kg/m²       Physical Exam  Vitals and nursing note reviewed.   Constitutional:       General: He is not in acute distress.     Appearance: He is well-developed. He is obese. He is not diaphoretic.   HENT:      Head: Normocephalic and atraumatic.      Nose: Nose normal.      Mouth/Throat:      Mouth: Mucous membranes are moist.      Pharynx: Oropharynx is clear. No oropharyngeal exudate.   Eyes:      General:         Right eye: No discharge.         Left eye: No discharge.      Conjunctiva/sclera: Conjunctivae normal.      Pupils: Pupils are equal, round, and reactive to light.   Neck:      Thyroid: No thyromegaly.      Vascular: No carotid bruit or JVD.   Cardiovascular:      Rate and Rhythm: Normal rate and regular rhythm.      Pulses: Normal pulses.           Carotid pulses are 2+ on the right side and 2+ on the left side.       Radial pulses are 2+ on the right side and 2+ on the left side.      Heart sounds: Normal heart sounds. No murmur heard.    No gallop.   Pulmonary:      Effort: Pulmonary effort is normal. No respiratory distress.      Breath sounds: Normal breath sounds. No wheezing or rales.   Chest:      Chest wall: No tenderness.   Abdominal:      General: Bowel sounds are normal. There is no distension.      Palpations: Abdomen is soft.      Tenderness: There is no " abdominal tenderness. There is no guarding or rebound.   Musculoskeletal:         General: Normal range of motion.      Cervical back: Full passive range of motion without pain, normal range of motion and neck supple.      Right lower leg: No edema.      Left lower leg: No edema.      Comments: Gait and coordination normal.  strong, equal. Upper and lower extremity strength normal.    Lymphadenopathy:      Cervical: No cervical adenopathy.   Skin:     General: Skin is warm and dry.      Capillary Refill: Capillary refill takes less than 2 seconds.      Findings: No rash.   Neurological:      General: No focal deficit present.      Mental Status: He is alert and oriented to person, place, and time.   Psychiatric:         Attention and Perception: Attention and perception normal.         Mood and Affect: Mood and affect normal.         Speech: Speech normal.         Behavior: Behavior normal.         Assessment:       1. Hypertriglyceridemia    2. Hypotestosteronemia    3. Secondary hypothyroidism    4. Prostate cancer screening    5. Gastroesophageal reflux disease without esophagitis    6. Traumatic rupture of right distal biceps tendon, subsequent encounter    7. Class 1 obesity with body mass index (BMI) of 33.0 to 33.9 in adult, unspecified obesity type, unspecified whether serious comorbidity present        Plan:       Hypertriglyceridemia: continue fenofibrate. Check labs.  -     CBC Auto Differential; Future; Expected date: 05/02/2022  -     Comprehensive Metabolic Panel; Future; Expected date: 05/02/2022  -     Lipid Panel; Future; Expected date: 05/02/2022    Hypotestosteronemia: follow up as scheduled with Urology  -     Testosterone; Future; Expected date: 05/02/2022    Secondary hypothyroidism: continue current dose. Will adjust medication if needed based on lab results  -     TSH; Future; Expected date: 05/02/2022    Prostate cancer screening  -     PSA, Screening; Future; Expected date:  05/02/2022    Gastroesophageal reflux disease without esophagitis: stable on current medication    Traumatic rupture of right distal biceps tendon, subsequent encounter: healing well. Follow with Orthopedics    Class 1 obesity with body mass index (BMI) of 33.0 to 33.9 in adult, unspecified obesity type, unspecified whether serious comorbidity present: Weight loss encouraged. Follow low fat, low carb diet. Portion control, exercise.        Continue current medication  Take medications only as prescribed  Healthy diet, exercise  Adequate rest  Adequate hydration  Avoid allergens  Avoid excessive caffeine     follow up 6 months.

## 2022-05-03 ENCOUNTER — PATIENT MESSAGE (OUTPATIENT)
Dept: FAMILY MEDICINE | Facility: CLINIC | Age: 47
End: 2022-05-03
Payer: COMMERCIAL

## 2022-05-07 ENCOUNTER — PATIENT OUTREACH (OUTPATIENT)
Dept: ADMINISTRATIVE | Facility: HOSPITAL | Age: 47
End: 2022-05-07
Payer: COMMERCIAL

## 2022-05-07 DIAGNOSIS — Z12.11 SCREENING FOR COLON CANCER: Primary | ICD-10-CM

## 2022-05-16 ENCOUNTER — PATIENT MESSAGE (OUTPATIENT)
Dept: ORTHOPEDICS | Facility: CLINIC | Age: 47
End: 2022-05-16
Payer: COMMERCIAL

## 2022-06-01 ENCOUNTER — OFFICE VISIT (OUTPATIENT)
Dept: ORTHOPEDICS | Facility: CLINIC | Age: 47
End: 2022-06-01
Payer: COMMERCIAL

## 2022-06-01 DIAGNOSIS — S46.211D TRAUMATIC RUPTURE OF RIGHT DISTAL BICEPS TENDON, SUBSEQUENT ENCOUNTER: Primary | ICD-10-CM

## 2022-06-01 PROCEDURE — 99213 PR OFFICE/OUTPT VISIT, EST, LEVL III, 20-29 MIN: ICD-10-PCS | Mod: S$GLB,,, | Performed by: ORTHOPAEDIC SURGERY

## 2022-06-01 PROCEDURE — 99213 OFFICE O/P EST LOW 20 MIN: CPT | Mod: S$GLB,,, | Performed by: ORTHOPAEDIC SURGERY

## 2022-06-01 PROCEDURE — 99999 PR PBB SHADOW E&M-EST. PATIENT-LVL III: CPT | Mod: PBBFAC,,, | Performed by: ORTHOPAEDIC SURGERY

## 2022-06-01 PROCEDURE — 99999 PR PBB SHADOW E&M-EST. PATIENT-LVL III: ICD-10-PCS | Mod: PBBFAC,,, | Performed by: ORTHOPAEDIC SURGERY

## 2022-06-01 PROCEDURE — 1159F PR MEDICATION LIST DOCUMENTED IN MEDICAL RECORD: ICD-10-PCS | Mod: CPTII,S$GLB,, | Performed by: ORTHOPAEDIC SURGERY

## 2022-06-01 PROCEDURE — 1159F MED LIST DOCD IN RCRD: CPT | Mod: CPTII,S$GLB,, | Performed by: ORTHOPAEDIC SURGERY

## 2022-06-01 NOTE — PROGRESS NOTES
Mr Lorenzo returns to clinic today.  He has a history of right distal biceps repair.  He is proximally 6 months post injury.  He is doing very well.  Has no complaints of pain or dysfunction    Physical exam:  Examination the right upper extremity reveals that the incision is well healed.  There is no edema.  Palpation reveals that his biceps appears intact.  He has full range of motion about the elbow and he does have 5/5 supination strength of the forearm.    Assessment:  Status post right distal biceps tendon repair    Plan:    1. He is allowed to return to activity as tolerated including full work duty without restriction    2. Will follow up on a p.r.n. basis

## 2022-06-01 NOTE — LETTER
June 1, 2022      [facility]  [address] [place] [address]  Phone: [phone]       Patient: Khadar Lorenzo   YOB: 1975  Date of Visit: 06/01/2022    To Whom It May Concern:    Eli Lorenzo  was at [facility] on 06/01/2022. The patient may return to work/school on 6/2/2022 with no restrictions. If you have any questions or concerns, or if I can be of further assistance, please do not hesitate to contact me.    Sincerely,    Bhavesh Steen MD

## 2022-06-02 ENCOUNTER — OCCUPATIONAL HEALTH (OUTPATIENT)
Dept: URGENT CARE | Facility: CLINIC | Age: 47
End: 2022-06-02

## 2022-06-02 DIAGNOSIS — Z76.89 RETURN TO WORK EVALUATION: Primary | ICD-10-CM

## 2022-06-02 PROCEDURE — 99499 RETURN TO WORK EXAM: BASIC: ICD-10-PCS | Mod: S$GLB,,, | Performed by: EMERGENCY MEDICINE

## 2022-06-02 PROCEDURE — 80305 OOH COLLECTION ONLY DRUG SCREEN: ICD-10-PCS | Mod: S$GLB,,, | Performed by: EMERGENCY MEDICINE

## 2022-06-02 PROCEDURE — 80305 DRUG TEST PRSMV DIR OPT OBS: CPT | Mod: S$GLB,,, | Performed by: EMERGENCY MEDICINE

## 2022-06-02 PROCEDURE — 99499 UNLISTED E&M SERVICE: CPT | Mod: S$GLB,,, | Performed by: EMERGENCY MEDICINE

## 2022-06-06 ENCOUNTER — LAB VISIT (OUTPATIENT)
Dept: LAB | Facility: HOSPITAL | Age: 47
End: 2022-06-06
Attending: NURSE PRACTITIONER
Payer: COMMERCIAL

## 2022-06-06 DIAGNOSIS — Z12.5 PROSTATE CANCER SCREENING: ICD-10-CM

## 2022-06-06 DIAGNOSIS — E78.1 HYPERTRIGLYCERIDEMIA: ICD-10-CM

## 2022-06-06 DIAGNOSIS — E03.8 SECONDARY HYPOTHYROIDISM: ICD-10-CM

## 2022-06-06 DIAGNOSIS — E34.9 HYPOTESTOSTERONEMIA: ICD-10-CM

## 2022-06-06 LAB
ALBUMIN SERPL BCP-MCNC: 4.2 G/DL (ref 3.5–5.2)
ALP SERPL-CCNC: 54 U/L (ref 55–135)
ALT SERPL W/O P-5'-P-CCNC: 24 U/L (ref 10–44)
ANION GAP SERPL CALC-SCNC: 9 MMOL/L (ref 8–16)
AST SERPL-CCNC: 18 U/L (ref 10–40)
BASOPHILS # BLD AUTO: 0.06 K/UL (ref 0–0.2)
BASOPHILS NFR BLD: 1 % (ref 0–1.9)
BILIRUB SERPL-MCNC: 0.7 MG/DL (ref 0.1–1)
BUN SERPL-MCNC: 9 MG/DL (ref 6–20)
CALCIUM SERPL-MCNC: 9.1 MG/DL (ref 8.7–10.5)
CHLORIDE SERPL-SCNC: 106 MMOL/L (ref 95–110)
CHOLEST SERPL-MCNC: 152 MG/DL (ref 120–199)
CHOLEST/HDLC SERPL: 5.4 {RATIO} (ref 2–5)
CO2 SERPL-SCNC: 24 MMOL/L (ref 23–29)
COMPLEXED PSA SERPL-MCNC: 1.9 NG/ML (ref 0–4)
CREAT SERPL-MCNC: 1.1 MG/DL (ref 0.5–1.4)
DIFFERENTIAL METHOD: ABNORMAL
EOSINOPHIL # BLD AUTO: 0.1 K/UL (ref 0–0.5)
EOSINOPHIL NFR BLD: 1.3 % (ref 0–8)
ERYTHROCYTE [DISTWIDTH] IN BLOOD BY AUTOMATED COUNT: 13.1 % (ref 11.5–14.5)
EST. GFR  (AFRICAN AMERICAN): >60 ML/MIN/1.73 M^2
EST. GFR  (NON AFRICAN AMERICAN): >60 ML/MIN/1.73 M^2
GLUCOSE SERPL-MCNC: 106 MG/DL (ref 70–110)
HCT VFR BLD AUTO: 48 % (ref 40–54)
HDLC SERPL-MCNC: 28 MG/DL (ref 40–75)
HDLC SERPL: 18.4 % (ref 20–50)
HGB BLD-MCNC: 15.2 G/DL (ref 14–18)
IMM GRANULOCYTES # BLD AUTO: 0.03 K/UL (ref 0–0.04)
IMM GRANULOCYTES NFR BLD AUTO: 0.5 % (ref 0–0.5)
LDLC SERPL CALC-MCNC: 88.2 MG/DL (ref 63–159)
LYMPHOCYTES # BLD AUTO: 2.3 K/UL (ref 1–4.8)
LYMPHOCYTES NFR BLD: 37.6 % (ref 18–48)
MCH RBC QN AUTO: 26.5 PG (ref 27–31)
MCHC RBC AUTO-ENTMCNC: 31.7 G/DL (ref 32–36)
MCV RBC AUTO: 84 FL (ref 82–98)
MONOCYTES # BLD AUTO: 0.6 K/UL (ref 0.3–1)
MONOCYTES NFR BLD: 9.3 % (ref 4–15)
NEUTROPHILS # BLD AUTO: 3.1 K/UL (ref 1.8–7.7)
NEUTROPHILS NFR BLD: 50.3 % (ref 38–73)
NONHDLC SERPL-MCNC: 124 MG/DL
NRBC BLD-RTO: 0 /100 WBC
PLATELET # BLD AUTO: 225 K/UL (ref 150–450)
PMV BLD AUTO: 9.9 FL (ref 9.2–12.9)
POTASSIUM SERPL-SCNC: 4.3 MMOL/L (ref 3.5–5.1)
PROT SERPL-MCNC: 6.6 G/DL (ref 6–8.4)
RBC # BLD AUTO: 5.73 M/UL (ref 4.6–6.2)
SODIUM SERPL-SCNC: 139 MMOL/L (ref 136–145)
TESTOST SERPL-MCNC: 262 NG/DL (ref 304–1227)
TRIGL SERPL-MCNC: 179 MG/DL (ref 30–150)
TSH SERPL DL<=0.005 MIU/L-ACNC: 2.16 UIU/ML (ref 0.4–4)
WBC # BLD AUTO: 6.22 K/UL (ref 3.9–12.7)

## 2022-06-06 PROCEDURE — 36415 COLL VENOUS BLD VENIPUNCTURE: CPT | Mod: PO | Performed by: NURSE PRACTITIONER

## 2022-06-06 PROCEDURE — 80053 COMPREHEN METABOLIC PANEL: CPT | Performed by: NURSE PRACTITIONER

## 2022-06-06 PROCEDURE — 84403 ASSAY OF TOTAL TESTOSTERONE: CPT | Performed by: NURSE PRACTITIONER

## 2022-06-06 PROCEDURE — 84153 ASSAY OF PSA TOTAL: CPT | Performed by: NURSE PRACTITIONER

## 2022-06-06 PROCEDURE — 85025 COMPLETE CBC W/AUTO DIFF WBC: CPT | Performed by: NURSE PRACTITIONER

## 2022-06-06 PROCEDURE — 80061 LIPID PANEL: CPT | Performed by: NURSE PRACTITIONER

## 2022-06-06 PROCEDURE — 84443 ASSAY THYROID STIM HORMONE: CPT | Performed by: NURSE PRACTITIONER

## 2022-06-07 ENCOUNTER — PATIENT MESSAGE (OUTPATIENT)
Dept: FAMILY MEDICINE | Facility: CLINIC | Age: 47
End: 2022-06-07
Payer: COMMERCIAL

## 2022-06-23 ENCOUNTER — OFFICE VISIT (OUTPATIENT)
Dept: UROLOGY | Facility: CLINIC | Age: 47
End: 2022-06-23
Payer: COMMERCIAL

## 2022-06-23 VITALS
HEART RATE: 71 BPM | HEIGHT: 67 IN | SYSTOLIC BLOOD PRESSURE: 124 MMHG | WEIGHT: 207.69 LBS | DIASTOLIC BLOOD PRESSURE: 82 MMHG | BODY MASS INDEX: 32.6 KG/M2

## 2022-06-23 DIAGNOSIS — E29.1 MALE HYPOGONADISM: Primary | ICD-10-CM

## 2022-06-23 DIAGNOSIS — N52.9 ERECTILE DYSFUNCTION, UNSPECIFIED ERECTILE DYSFUNCTION TYPE: ICD-10-CM

## 2022-06-23 PROCEDURE — 3074F PR MOST RECENT SYSTOLIC BLOOD PRESSURE < 130 MM HG: ICD-10-PCS | Mod: CPTII,S$GLB,, | Performed by: NURSE PRACTITIONER

## 2022-06-23 PROCEDURE — 1159F PR MEDICATION LIST DOCUMENTED IN MEDICAL RECORD: ICD-10-PCS | Mod: CPTII,S$GLB,, | Performed by: NURSE PRACTITIONER

## 2022-06-23 PROCEDURE — 3079F DIAST BP 80-89 MM HG: CPT | Mod: CPTII,S$GLB,, | Performed by: NURSE PRACTITIONER

## 2022-06-23 PROCEDURE — 1160F RVW MEDS BY RX/DR IN RCRD: CPT | Mod: CPTII,S$GLB,, | Performed by: NURSE PRACTITIONER

## 2022-06-23 PROCEDURE — 99999 PR PBB SHADOW E&M-EST. PATIENT-LVL IV: CPT | Mod: PBBFAC,,, | Performed by: NURSE PRACTITIONER

## 2022-06-23 PROCEDURE — 99214 OFFICE O/P EST MOD 30 MIN: CPT | Mod: S$GLB,,, | Performed by: NURSE PRACTITIONER

## 2022-06-23 PROCEDURE — 99999 PR PBB SHADOW E&M-EST. PATIENT-LVL IV: ICD-10-PCS | Mod: PBBFAC,,, | Performed by: NURSE PRACTITIONER

## 2022-06-23 PROCEDURE — 1160F PR REVIEW ALL MEDS BY PRESCRIBER/CLIN PHARMACIST DOCUMENTED: ICD-10-PCS | Mod: CPTII,S$GLB,, | Performed by: NURSE PRACTITIONER

## 2022-06-23 PROCEDURE — 3008F BODY MASS INDEX DOCD: CPT | Mod: CPTII,S$GLB,, | Performed by: NURSE PRACTITIONER

## 2022-06-23 PROCEDURE — 3008F PR BODY MASS INDEX (BMI) DOCUMENTED: ICD-10-PCS | Mod: CPTII,S$GLB,, | Performed by: NURSE PRACTITIONER

## 2022-06-23 PROCEDURE — 1159F MED LIST DOCD IN RCRD: CPT | Mod: CPTII,S$GLB,, | Performed by: NURSE PRACTITIONER

## 2022-06-23 PROCEDURE — 3074F SYST BP LT 130 MM HG: CPT | Mod: CPTII,S$GLB,, | Performed by: NURSE PRACTITIONER

## 2022-06-23 PROCEDURE — 99214 PR OFFICE/OUTPT VISIT, EST, LEVL IV, 30-39 MIN: ICD-10-PCS | Mod: S$GLB,,, | Performed by: NURSE PRACTITIONER

## 2022-06-23 PROCEDURE — 3079F PR MOST RECENT DIASTOLIC BLOOD PRESSURE 80-89 MM HG: ICD-10-PCS | Mod: CPTII,S$GLB,, | Performed by: NURSE PRACTITIONER

## 2022-06-23 RX ORDER — TADALAFIL 5 MG/1
5 TABLET ORAL DAILY PRN
Qty: 30 TABLET | Refills: 12 | Status: SHIPPED | OUTPATIENT
Start: 2022-06-23 | End: 2023-05-10 | Stop reason: SDUPTHER

## 2022-06-23 RX ORDER — TESTOSTERONE CYPIONATE 200 MG/ML
300 INJECTION, SOLUTION INTRAMUSCULAR
Qty: 10 ML | Refills: 3 | Status: SHIPPED | OUTPATIENT
Start: 2022-06-23 | End: 2022-08-11 | Stop reason: SDUPTHER

## 2022-06-23 RX ORDER — TADALAFIL 20 MG/1
TABLET ORAL
Qty: 6 TABLET | Refills: 12 | Status: SHIPPED | OUTPATIENT
Start: 2022-06-23 | End: 2023-05-10 | Stop reason: SDUPTHER

## 2022-06-23 NOTE — PROGRESS NOTES
[facility] Note  Staff: PRISCILLA Damon    PCP: PEDRITO Strickland    Chief Complaint: F/UP-Hypogonadism, ED, TRT    Subjective:        HPI: Khadar Lorenzo is a 46 y.o. male presents today for annual exam (overdue)/recheck.  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  Testosterone cypionate 200 mg/mL 1 mL IM every 14 days.    ED:  Hx of erectile dysfunction.  Pt has tried Viagra in the past and Cialis 5 mg daily with no improvement in obtaining and keeping an erection.  Therefore last ov, we increased Cialis to 20 mg prn.     No gross hematuria/no dysuria noted     RECENT LAB RESULTS:  (06/06/22):  PSA, Screening-1.9  Testosterone, Total-262  @08:50am    REVIEW OF SYSTEMS:  A comprehensive 10 system review was performed and is negative except as noted above in HPI    PMHx:  Past Medical History:   Diagnosis Date    Anxiety     Depression     GERD (gastroesophageal reflux disease)     Hyperlipidemia     Hypotestosteronemia     Hypothyroidism     Nephrolithiasis     2 episodes    Obesity     Snoring     Syncope and collapse      PSHx:  Past Surgical History:   Procedure Laterality Date    CYST REMOVAL      left wrist    FRACTURE SURGERY      screw in right thumb    testopel      by Dr. Martini in the past for his hypogonadism.    TONSILLECTOMY, ADENOIDECTOMY, BILATERAL MYRINGOTOMY AND TUBES       Allergies:  Patient has no known allergies.    Medications: reviewed     Objective:     Vitals:    06/23/22 1432   BP: 124/82   Pulse: 71     General:WDWN in NAD  Eyes: PERRLA, normal conjunctiva  Respiratory: no increased work on breathing, clear to auscultation  Cardiovascular: regular rate and rhythm. No obvious extremity edema.  GI: palpation of masses. No tenderness. No hepatosplenomegaly to palpation.  Musculoskeletal: normal range of motion of bilateral upper  extremities. Normal muscle strength and tone.  Skin: no obvious rashes or lesions. No tightening of skin noted.  Neurologic: CN grossly normal. Normal sensation.   Psychiatric: awake, alert and oriented x 3. Mood and affect normal. Cooperative.  Assessment:       1. Male hypogonadism    2. Erectile dysfunction, unspecified erectile dysfunction type          Plan:   The following recommendations for pt on conclusion of ov today:    We will increase to testosterone cypionate 300 mg/1.5 mL every 14 days at this time.    Labs in 2 months to recheck levels-Testosterone, PSA, CBC, then again in 6 months.    F/u with me in six months if T levels are stabilized in the near future, once we re-evaluate levels in next 2-3 months if levels abnormal, then will re-evaluate sooner for this pt.    [facility]: Active    Bell Knox, ABBYP-C

## 2022-08-09 ENCOUNTER — PATIENT MESSAGE (OUTPATIENT)
Dept: UROLOGY | Facility: CLINIC | Age: 47
End: 2022-08-09
Payer: COMMERCIAL

## 2022-08-11 RX ORDER — TESTOSTERONE CYPIONATE 200 MG/ML
300 INJECTION, SOLUTION INTRAMUSCULAR
Qty: 10 ML | Refills: 3 | Status: SHIPPED | OUTPATIENT
Start: 2022-08-11 | End: 2022-10-20 | Stop reason: SDUPTHER

## 2022-08-29 ENCOUNTER — LAB VISIT (OUTPATIENT)
Dept: LAB | Facility: HOSPITAL | Age: 47
End: 2022-08-29
Attending: NURSE PRACTITIONER
Payer: COMMERCIAL

## 2022-08-29 DIAGNOSIS — N52.9 ERECTILE DYSFUNCTION, UNSPECIFIED ERECTILE DYSFUNCTION TYPE: ICD-10-CM

## 2022-08-29 DIAGNOSIS — E29.1 MALE HYPOGONADISM: ICD-10-CM

## 2022-08-29 LAB
COMPLEXED PSA SERPL-MCNC: 1.9 NG/ML (ref 0–4)
ERYTHROCYTE [DISTWIDTH] IN BLOOD BY AUTOMATED COUNT: 13.5 % (ref 11.5–14.5)
HCT VFR BLD AUTO: 48.7 % (ref 40–54)
HGB BLD-MCNC: 15.8 G/DL (ref 14–18)
MCH RBC QN AUTO: 26.7 PG (ref 27–31)
MCHC RBC AUTO-ENTMCNC: 32.4 G/DL (ref 32–36)
MCV RBC AUTO: 82 FL (ref 82–98)
PLATELET # BLD AUTO: 200 K/UL (ref 150–450)
PMV BLD AUTO: 9.4 FL (ref 9.2–12.9)
RBC # BLD AUTO: 5.91 M/UL (ref 4.6–6.2)
TESTOST SERPL-MCNC: 272 NG/DL (ref 304–1227)
WBC # BLD AUTO: 7.49 K/UL (ref 3.9–12.7)

## 2022-08-29 PROCEDURE — 85027 COMPLETE CBC AUTOMATED: CPT | Performed by: NURSE PRACTITIONER

## 2022-08-29 PROCEDURE — 84153 ASSAY OF PSA TOTAL: CPT | Performed by: NURSE PRACTITIONER

## 2022-08-29 PROCEDURE — 36415 COLL VENOUS BLD VENIPUNCTURE: CPT | Performed by: NURSE PRACTITIONER

## 2022-08-29 PROCEDURE — 84403 ASSAY OF TOTAL TESTOSTERONE: CPT | Performed by: NURSE PRACTITIONER

## 2022-08-30 DIAGNOSIS — E29.1 MALE HYPOGONADISM: Primary | ICD-10-CM

## 2022-09-26 ENCOUNTER — LAB VISIT (OUTPATIENT)
Dept: LAB | Facility: HOSPITAL | Age: 47
End: 2022-09-26
Attending: NURSE PRACTITIONER
Payer: COMMERCIAL

## 2022-09-26 DIAGNOSIS — E29.1 MALE HYPOGONADISM: ICD-10-CM

## 2022-09-26 LAB — TESTOST SERPL-MCNC: 291 NG/DL (ref 304–1227)

## 2022-09-26 PROCEDURE — 84403 ASSAY OF TOTAL TESTOSTERONE: CPT | Performed by: NURSE PRACTITIONER

## 2022-09-26 PROCEDURE — 36415 COLL VENOUS BLD VENIPUNCTURE: CPT | Performed by: NURSE PRACTITIONER

## 2022-10-09 ENCOUNTER — PATIENT MESSAGE (OUTPATIENT)
Dept: UROLOGY | Facility: CLINIC | Age: 47
End: 2022-10-09
Payer: COMMERCIAL

## 2022-10-13 ENCOUNTER — TELEPHONE (OUTPATIENT)
Dept: UROLOGY | Facility: CLINIC | Age: 47
End: 2022-10-13
Payer: COMMERCIAL

## 2022-10-13 ENCOUNTER — HOSPITAL ENCOUNTER (OUTPATIENT)
Dept: RADIOLOGY | Facility: HOSPITAL | Age: 47
Discharge: HOME | End: 2022-10-13
Attending: NURSE PRACTITIONER
Payer: COMMERCIAL

## 2022-10-13 ENCOUNTER — OFFICE VISIT (OUTPATIENT)
Dept: FAMILY MEDICINE | Facility: CLINIC | Age: 47
End: 2022-10-13
Attending: NURSE PRACTITIONER
Payer: COMMERCIAL

## 2022-10-13 VITALS
DIASTOLIC BLOOD PRESSURE: 78 MMHG | BODY MASS INDEX: 29.81 KG/M2 | TEMPERATURE: 98 F | HEIGHT: 71 IN | SYSTOLIC BLOOD PRESSURE: 126 MMHG | RESPIRATION RATE: 16 BRPM | OXYGEN SATURATION: 98 % | HEART RATE: 69 BPM | WEIGHT: 212.94 LBS

## 2022-10-13 DIAGNOSIS — E03.8 SECONDARY HYPOTHYROIDISM: ICD-10-CM

## 2022-10-13 DIAGNOSIS — E34.9 HYPOTESTOSTERONEMIA: ICD-10-CM

## 2022-10-13 DIAGNOSIS — K21.9 GASTROESOPHAGEAL REFLUX DISEASE WITHOUT ESOPHAGITIS: ICD-10-CM

## 2022-10-13 DIAGNOSIS — E78.1 HYPERTRIGLYCERIDEMIA: ICD-10-CM

## 2022-10-13 DIAGNOSIS — E29.1 MALE HYPOGONADISM: Primary | ICD-10-CM

## 2022-10-13 DIAGNOSIS — M25.562 CHRONIC PAIN OF BOTH KNEES: ICD-10-CM

## 2022-10-13 DIAGNOSIS — M25.562 CHRONIC PAIN OF BOTH KNEES: Primary | ICD-10-CM

## 2022-10-13 DIAGNOSIS — N40.1 BPH WITH URINARY OBSTRUCTION: ICD-10-CM

## 2022-10-13 DIAGNOSIS — M25.561 CHRONIC PAIN OF BOTH KNEES: ICD-10-CM

## 2022-10-13 DIAGNOSIS — M25.561 CHRONIC PAIN OF BOTH KNEES: Primary | ICD-10-CM

## 2022-10-13 DIAGNOSIS — G89.29 CHRONIC PAIN OF BOTH KNEES: ICD-10-CM

## 2022-10-13 DIAGNOSIS — G89.29 CHRONIC PAIN OF BOTH KNEES: Primary | ICD-10-CM

## 2022-10-13 DIAGNOSIS — N13.8 BPH WITH URINARY OBSTRUCTION: ICD-10-CM

## 2022-10-13 PROCEDURE — 99214 OFFICE O/P EST MOD 30 MIN: CPT | Mod: 25,S$GLB,, | Performed by: NURSE PRACTITIONER

## 2022-10-13 PROCEDURE — 3078F DIAST BP <80 MM HG: CPT | Mod: CPTII,S$GLB,, | Performed by: NURSE PRACTITIONER

## 2022-10-13 PROCEDURE — 90471 IMMUNIZATION ADMIN: CPT | Mod: S$GLB,,, | Performed by: NURSE PRACTITIONER

## 2022-10-13 PROCEDURE — 1160F PR REVIEW ALL MEDS BY PRESCRIBER/CLIN PHARMACIST DOCUMENTED: ICD-10-PCS | Mod: CPTII,S$GLB,, | Performed by: NURSE PRACTITIONER

## 2022-10-13 PROCEDURE — 1159F MED LIST DOCD IN RCRD: CPT | Mod: CPTII,S$GLB,, | Performed by: NURSE PRACTITIONER

## 2022-10-13 PROCEDURE — 3074F SYST BP LT 130 MM HG: CPT | Mod: CPTII,S$GLB,, | Performed by: NURSE PRACTITIONER

## 2022-10-13 PROCEDURE — 73564 X-RAY EXAM KNEE 4 OR MORE: CPT | Mod: TC,50,FY,PO

## 2022-10-13 PROCEDURE — 3074F PR MOST RECENT SYSTOLIC BLOOD PRESSURE < 130 MM HG: ICD-10-PCS | Mod: CPTII,S$GLB,, | Performed by: NURSE PRACTITIONER

## 2022-10-13 PROCEDURE — 73564 X-RAY EXAM KNEE 4 OR MORE: CPT | Mod: 26,,, | Performed by: RADIOLOGY

## 2022-10-13 PROCEDURE — 99214 PR OFFICE/OUTPT VISIT, EST, LEVL IV, 30-39 MIN: ICD-10-PCS | Mod: 25,S$GLB,, | Performed by: NURSE PRACTITIONER

## 2022-10-13 PROCEDURE — 1160F RVW MEDS BY RX/DR IN RCRD: CPT | Mod: CPTII,S$GLB,, | Performed by: NURSE PRACTITIONER

## 2022-10-13 PROCEDURE — 1159F PR MEDICATION LIST DOCUMENTED IN MEDICAL RECORD: ICD-10-PCS | Mod: CPTII,S$GLB,, | Performed by: NURSE PRACTITIONER

## 2022-10-13 PROCEDURE — 90686 IIV4 VACC NO PRSV 0.5 ML IM: CPT | Mod: S$GLB,,, | Performed by: NURSE PRACTITIONER

## 2022-10-13 PROCEDURE — 90686 FLU VACCINE (QUAD) GREATER THAN OR EQUAL TO 3YO PRESERVATIVE FREE IM: ICD-10-PCS | Mod: S$GLB,,, | Performed by: NURSE PRACTITIONER

## 2022-10-13 PROCEDURE — 3078F PR MOST RECENT DIASTOLIC BLOOD PRESSURE < 80 MM HG: ICD-10-PCS | Mod: CPTII,S$GLB,, | Performed by: NURSE PRACTITIONER

## 2022-10-13 PROCEDURE — 90471 FLU VACCINE (QUAD) GREATER THAN OR EQUAL TO 3YO PRESERVATIVE FREE IM: ICD-10-PCS | Mod: S$GLB,,, | Performed by: NURSE PRACTITIONER

## 2022-10-13 PROCEDURE — 73564 XR KNEE ORTHO BILAT WITH FLEXION: ICD-10-PCS | Mod: 26,,, | Performed by: RADIOLOGY

## 2022-10-13 NOTE — TELEPHONE ENCOUNTER
Spoke with pt   Per tayler  Please call and advise pt of the following:     Due to pt still having low Testosterone levels on current dosage of med, we recommend at this time he see the Endocrinologist for further evaluation and treatment options at this time.  A referral has been placed.   Gigi hall

## 2022-10-13 NOTE — TELEPHONE ENCOUNTER
Please call and advise pt of the following:    Due to pt still having low Testosterone levels on current dosage of med, we recommend at this time he see the Endocrinologist for further evaluation and treatment options at this time.  A referral has been placed.

## 2022-10-13 NOTE — PROGRESS NOTES
Subjective:       Patient ID: Khadar Lorenzo is a 46 y.o. male.    Chief Complaint: Follow-up and Knee Pain (Both knees / getting worse )    HPI here for follow up. States he is doing alright. He had labs done in June, no need to repeat at this time. Had recent repeat of his testosterone levels which is still in the 200 range despite increase of testosterone to 1.5 cc every 14 days. States he is waiting follow up instructions from his Urology Provider.     He has been having pain in both his knees over the past year, seems to be getting worse. Will hear, feel pop at times. Feels discomfort every day. Worse with bending knees. Pressure Behind knee, under knee cap. Feels weak at times. States he has to crawl, squeeze into tight spots with his job. States if he turns his knee the wrong was will get a really intense sharp pain. Will schedule xrays and evaluation by Orthopedics.     He denies any other concerns. Bicep repair has healed well.     The following portion of the patients history was reviewed and updated as appropriate: allergies, current medications, past medical and surgical history. Past social history and problem list reviewed. Family PMH and Past social history reviewed. Tobacco, Illicit drug use reviewed.      Review of patient's allergies indicates:  No Known Allergies      Current Outpatient Medications:     cyclobenzaprine (FLEXERIL) 10 MG tablet, Take 10 mg by mouth 3 (three) times daily as needed for Muscle spasms., Disp: , Rfl:     fenofibrate 160 MG Tab, TAKE 1 TABLET DAILY, Disp: 90 tablet, Rfl: 3    levothyroxine (SYNTHROID) 75 MCG tablet, TAKE 1 TABLET BEFORE BREAKFAST, Disp: 90 tablet, Rfl: 3    mupirocin (BACTROBAN) 2 % ointment, Apply to surgical site BID, Disp: 22 g, Rfl: 1    OLUX-E 0.05 % topical foam, Apply 0.05 % topically continuous prn., Disp: , Rfl:     omeprazole (PRILOSEC) 40 MG capsule, TAKE 1 CAPSULE DAILY, Disp: 90 capsule, Rfl: 3    ondansetron (ZOFRAN-ODT) 8 MG TbDL,  Take 1 tablet (8 mg total) by mouth every 8 (eight) hours as needed (Nausea)., Disp: 3 tablet, Rfl: 0    tadalafiL (CIALIS) 20 MG Tab, TAKE 1/2 TO 1 TABLET BY MOUTH EVERY 3 DAYS AS NEEDED, Disp: 6 tablet, Rfl: 12    tadalafiL (CIALIS) 5 MG tablet, Take 1 tablet (5 mg total) by mouth daily as needed for Erectile Dysfunction., Disp: 30 tablet, Rfl: 12    testosterone cypionate (DEPOTESTOTERONE CYPIONATE) 200 mg/mL injection, Inject 1.5 mLs (300 mg total) into the muscle every 14 (fourteen) days., Disp: 10 mL, Rfl: 3    triamcinolone acetonide 0.1% (KENALOG) 0.1 % cream, aaa up to BID PRN rash, Disp: 240 g, Rfl: 0    Past Medical History:   Diagnosis Date    Anxiety     Depression     GERD (gastroesophageal reflux disease)     Hyperlipidemia     Hypotestosteronemia     Hypothyroidism     Nephrolithiasis     2 episodes    Obesity     Snoring     Syncope and collapse        Past Surgical History:   Procedure Laterality Date    CYST REMOVAL      left wrist    FRACTURE SURGERY      screw in right thumb    testopel      by Dr. Martini in the past for his hypogonadism.    TONSILLECTOMY, ADENOIDECTOMY, BILATERAL MYRINGOTOMY AND TUBES         Social History     Socioeconomic History    Marital status:    Occupational History    Occupation: [employer]   Tobacco Use    Smoking status: Former    Smokeless tobacco: Current     Types: Chew    Tobacco comments:     chew everyday, 0.25 can    Substance and Sexual Activity    Alcohol use: Yes     Alcohol/week: 1.0 standard drink     Types: 1 Cans of beer per week     Comment: socially    Drug use: No    Sexual activity: Yes     Partners: Female     Review of Systems   Constitutional:  Positive for fatigue. Negative for activity change, appetite change and fever.   HENT:  Negative for congestion, rhinorrhea and trouble swallowing.    Eyes:  Negative for visual disturbance.   Respiratory:  Negative for cough, chest tightness, shortness of breath and wheezing.   "  Cardiovascular:  Negative for chest pain, palpitations and leg swelling.   Gastrointestinal:  Negative for abdominal pain, blood in stool, diarrhea, nausea and vomiting.   Genitourinary:  Negative for difficulty urinating.   Musculoskeletal:  Positive for arthralgias. Negative for back pain and gait problem.        Bilateral knee pain, see HPI   Skin:  Negative for rash.   Neurological:  Negative for dizziness, weakness and headaches.   Hematological:  Negative for adenopathy. Does not bruise/bleed easily.   Psychiatric/Behavioral:  Negative for decreased concentration, dysphoric mood and sleep disturbance. The patient is not nervous/anxious.      Objective:      /78   Pulse 69   Temp 98.4 °F (36.9 °C) (Temporal)   Resp 16   Ht 5' 11" (1.803 m)   Wt 96.6 kg (212 lb 15.4 oz)   SpO2 98%   BMI 29.70 kg/m²      Physical Exam  Vitals and nursing note reviewed.   Constitutional:       General: He is not in acute distress.     Appearance: Normal appearance. He is well-developed. He is not diaphoretic.   HENT:      Head: Normocephalic and atraumatic.      Mouth/Throat:      Mouth: Mucous membranes are moist.      Pharynx: Oropharynx is clear. No oropharyngeal exudate.   Eyes:      General:         Right eye: No discharge.         Left eye: No discharge.      Conjunctiva/sclera: Conjunctivae normal.      Pupils: Pupils are equal, round, and reactive to light.   Neck:      Thyroid: No thyromegaly.      Vascular: No carotid bruit or JVD.   Cardiovascular:      Rate and Rhythm: Normal rate and regular rhythm.      Pulses: Normal pulses.           Carotid pulses are 2+ on the right side and 2+ on the left side.       Radial pulses are 2+ on the right side and 2+ on the left side.      Heart sounds: Normal heart sounds. No murmur heard.    No gallop.   Pulmonary:      Effort: Pulmonary effort is normal. No respiratory distress.      Breath sounds: Normal breath sounds. No wheezing or rales.   Chest:      Chest " wall: No tenderness.   Musculoskeletal:         General: Normal range of motion.      Cervical back: Full passive range of motion without pain, normal range of motion and neck supple.      Right knee: Crepitus present. No effusion. Normal range of motion. Normal alignment and normal patellar mobility.      Left knee: No effusion or crepitus. Normal range of motion. Normal alignment and normal patellar mobility.      Right lower leg: No edema.      Left lower leg: No edema.      Comments: Gait and coordination normal.  strong, equal. Upper and lower extremity strength normal.    Lymphadenopathy:      Cervical: No cervical adenopathy.   Skin:     General: Skin is warm and dry.      Capillary Refill: Capillary refill takes less than 2 seconds.      Findings: No rash.   Neurological:      General: No focal deficit present.      Mental Status: He is alert and oriented to person, place, and time.   Psychiatric:         Attention and Perception: Attention and perception normal.         Mood and Affect: Mood and affect normal.         Speech: Speech normal.         Behavior: Behavior normal.         Thought Content: Thought content normal.         Judgment: Judgment normal.       Assessment:       1. Chronic pain of both knees    2. Hypertriglyceridemia    3. BPH with urinary obstruction    4. Secondary hypothyroidism    5. Hypotestosteronemia    6. Gastroesophageal reflux disease without esophagitis        Plan:       Chronic pain of both knees: schedule labs, refer to Orthopedics.  -     X-ray Knee Ortho Bilateral with Flexion; Future; Expected date: 10/13/2022  -     Ambulatory referral/consult to Orthopedics; Future; Expected date: 10/20/2022    Hypertriglyceridemia: continue fenofibrate. Last triglyceride level was 179, down from 220. LDL in good range at 88    BPH with urinary obstruction: followed by Urology. On cialis.    Secondary hypothyroidism: stable. Last TSH in good range.    Hypotestosteronemia: followed  by Urology.     Gastroesophageal reflux disease without esophagitis: stable with prilosec as needed    Other orders  -     Influenza - Quadrivalent (PF)     Continue current medication  Take medications only as prescribed  Healthy diet, exercise  Adequate rest  Adequate hydration  Avoid allergens  Avoid excessive caffeine      Follow up 6 months.

## 2022-10-14 ENCOUNTER — PATIENT MESSAGE (OUTPATIENT)
Dept: FAMILY MEDICINE | Facility: CLINIC | Age: 47
End: 2022-10-14
Payer: COMMERCIAL

## 2022-10-14 ENCOUNTER — TELEPHONE (OUTPATIENT)
Dept: ENDOCRINOLOGY | Facility: CLINIC | Age: 47
End: 2022-10-14
Payer: COMMERCIAL

## 2022-10-19 ENCOUNTER — OFFICE VISIT (OUTPATIENT)
Dept: ORTHOPEDICS | Facility: CLINIC | Age: 47
End: 2022-10-19
Payer: COMMERCIAL

## 2022-10-19 VITALS — HEIGHT: 71 IN | RESPIRATION RATE: 18 BRPM | BODY MASS INDEX: 29.68 KG/M2 | WEIGHT: 212 LBS

## 2022-10-19 DIAGNOSIS — M22.2X1 PATELLOFEMORAL PAIN SYNDROME OF BOTH KNEES: Primary | ICD-10-CM

## 2022-10-19 DIAGNOSIS — M25.561 CHRONIC PAIN OF BOTH KNEES: ICD-10-CM

## 2022-10-19 DIAGNOSIS — M22.2X2 PATELLOFEMORAL PAIN SYNDROME OF BOTH KNEES: Primary | ICD-10-CM

## 2022-10-19 DIAGNOSIS — M25.562 CHRONIC PAIN OF BOTH KNEES: ICD-10-CM

## 2022-10-19 DIAGNOSIS — G89.29 CHRONIC PAIN OF BOTH KNEES: Primary | ICD-10-CM

## 2022-10-19 DIAGNOSIS — M25.562 CHRONIC PAIN OF BOTH KNEES: Primary | ICD-10-CM

## 2022-10-19 DIAGNOSIS — M25.561 CHRONIC PAIN OF BOTH KNEES: Primary | ICD-10-CM

## 2022-10-19 DIAGNOSIS — G89.29 CHRONIC PAIN OF BOTH KNEES: ICD-10-CM

## 2022-10-19 PROCEDURE — 99204 OFFICE O/P NEW MOD 45 MIN: CPT | Mod: S$GLB,,, | Performed by: ORTHOPAEDIC SURGERY

## 2022-10-19 PROCEDURE — 99999 PR PBB SHADOW E&M-EST. PATIENT-LVL IV: ICD-10-PCS | Mod: PBBFAC,,, | Performed by: ORTHOPAEDIC SURGERY

## 2022-10-19 PROCEDURE — 99999 PR PBB SHADOW E&M-EST. PATIENT-LVL IV: CPT | Mod: PBBFAC,,, | Performed by: ORTHOPAEDIC SURGERY

## 2022-10-19 PROCEDURE — 1159F PR MEDICATION LIST DOCUMENTED IN MEDICAL RECORD: ICD-10-PCS | Mod: CPTII,S$GLB,, | Performed by: ORTHOPAEDIC SURGERY

## 2022-10-19 PROCEDURE — 99204 PR OFFICE/OUTPT VISIT, NEW, LEVL IV, 45-59 MIN: ICD-10-PCS | Mod: S$GLB,,, | Performed by: ORTHOPAEDIC SURGERY

## 2022-10-19 PROCEDURE — 1160F RVW MEDS BY RX/DR IN RCRD: CPT | Mod: CPTII,S$GLB,, | Performed by: ORTHOPAEDIC SURGERY

## 2022-10-19 PROCEDURE — 1160F PR REVIEW ALL MEDS BY PRESCRIBER/CLIN PHARMACIST DOCUMENTED: ICD-10-PCS | Mod: CPTII,S$GLB,, | Performed by: ORTHOPAEDIC SURGERY

## 2022-10-19 PROCEDURE — 1159F MED LIST DOCD IN RCRD: CPT | Mod: CPTII,S$GLB,, | Performed by: ORTHOPAEDIC SURGERY

## 2022-10-19 RX ORDER — MELOXICAM 15 MG/1
15 TABLET ORAL DAILY PRN
Qty: 30 TABLET | Refills: 2 | Status: SHIPPED | OUTPATIENT
Start: 2022-10-19 | End: 2023-12-11

## 2022-10-19 NOTE — PROGRESS NOTES
Past Medical History:   Diagnosis Date    Anxiety     Depression     GERD (gastroesophageal reflux disease)     Hyperlipidemia     Hypotestosteronemia     Hypothyroidism     Nephrolithiasis     2 episodes    Obesity     Snoring     Syncope and collapse        Past Surgical History:   Procedure Laterality Date    CYST REMOVAL      left wrist    FRACTURE SURGERY      screw in right thumb    testopel      by Dr. Martini in the past for his hypogonadism.    TONSILLECTOMY, ADENOIDECTOMY, BILATERAL MYRINGOTOMY AND TUBES         Current Outpatient Medications   Medication Sig    cyclobenzaprine (FLEXERIL) 10 MG tablet Take 10 mg by mouth 3 (three) times daily as needed for Muscle spasms.    fenofibrate 160 MG Tab TAKE 1 TABLET DAILY    levothyroxine (SYNTHROID) 75 MCG tablet TAKE 1 TABLET BEFORE BREAKFAST    mupirocin (BACTROBAN) 2 % ointment Apply to surgical site BID    OLUX-E 0.05 % topical foam Apply 0.05 % topically continuous prn.    omeprazole (PRILOSEC) 40 MG capsule TAKE 1 CAPSULE DAILY    ondansetron (ZOFRAN-ODT) 8 MG TbDL Take 1 tablet (8 mg total) by mouth every 8 (eight) hours as needed (Nausea).    tadalafiL (CIALIS) 20 MG Tab TAKE 1/2 TO 1 TABLET BY MOUTH EVERY 3 DAYS AS NEEDED    tadalafiL (CIALIS) 5 MG tablet Take 1 tablet (5 mg total) by mouth daily as needed for Erectile Dysfunction.    triamcinolone acetonide 0.1% (KENALOG) 0.1 % cream aaa up to BID PRN rash    testosterone cypionate (DEPOTESTOTERONE CYPIONATE) 200 mg/mL injection Inject 1.5 mLs (300 mg total) into the muscle every 14 (fourteen) days.     No current facility-administered medications for this visit.       Review of patient's allergies indicates:  No Known Allergies    Family History   Problem Relation Age of Onset    Cancer Father     Hypertension Father     Hyperlipidemia Father     Skin cancer Father     Drug abuse Maternal Uncle     Arthritis Maternal Grandmother     Mental illness Maternal Grandfather     Alcohol abuse Paternal  Grandfather     Melanoma Neg Hx     Psoriasis Neg Hx     Lupus Neg Hx     Eczema Neg Hx        Social History     Socioeconomic History    Marital status:    Occupational History    Occupation: [employer]   Tobacco Use    Smoking status: Former    Smokeless tobacco: Current     Types: Chew    Tobacco comments:     chew everyday, 0.25 can    Substance and Sexual Activity    Alcohol use: Yes     Alcohol/week: 1.0 standard drink     Types: 1 Cans of beer per week     Comment: socially    Drug use: No    Sexual activity: Yes     Partners: Female       Chief Complaint:   Chief Complaint   Patient presents with    Knee Pain     Eval B knee pain        History of present illness:  46-year-old male seen in consultation for Talita Strickland.  Patient has had bilateral knee pain for least a year.  No injury or trauma.  Pain along the front of his knees.  Pain with kneeling bending or squatting.  Patient denies swelling or mechanical symptoms.  Both knees are equal.  No previous treatment.  Pain today is 0/10.      Answers submitted by the patient for this visit:  Orthopedics Questionnaire (Submitted on 10/19/2022)  unexpected weight change: No  appetite change : No  sleep disturbance: No  IMMUNOCOMPROMISED: No  nervous/ anxious: No  dysphoric mood: No  rash: No  visual disturbance: No  eye redness: No  eye pain: No  ear pain: No  tinnitus: No  hearing loss: No  sinus pressure : No  nosebleeds: No  enviro allergies: No  food allergies: No  cough: No  shortness of breath: No  sweating: No  frequency: No  difficulty urinating: No  hematuria: No  chest pain: No  palpitations: No  nausea: No  vomiting: No  diarrhea: No  blood in stool: No  constipation: No  headaches: No  dizziness: No  numbness: No  seizures: No  joint swelling: No  myalgia: No  weakness: No  back pain: No   (Submitted on 10/19/2022)  Chief Complaint: Leg pain  Pain Chronicity: recurrent  History of trauma: No  Onset: more than 1 year  ago  Frequency: daily  Progression since onset: gradually worsening  injury location: at home  pain- numeric: 8/10  pain location: left knee, right knee  pain quality: sharp, shooting  Radiating Pain: No  Aggravating factors: bending  fever: No  inability to bear weight: Yes  itching: No  joint locking: No  limited range of motion: No  stiffness: No  tingling: No  Treatments tried: nothing  physical therapy: not tried  Improvement on treatment: no relief      Physical Examination:    Vital Signs:    Vitals:    10/19/22 1252   Resp: 18       Body mass index is 29.57 kg/m².    This a well-developed, well nourished patient in no acute distress.  They are alert and oriented and cooperative to examination.  Pt. walks without an antalgic gait.      Examination of t bilateral knees shows no rashes or erythema. There are no masses ecchymosis or effusion. Patient has full range of motion from 0-130°. Patient is nontender to palpation over lateral joint line and nontender to palpation over the medial joint line. Patient has a - Lachman exam, - anterior drawer exam, and - posterior drawer exam. - Deandre's exam. Knee is stable to varus and valgus stress. 5 out of 5 motor strength. Palpable distal pulses. Intact light touch sensation. Negative Patellofemoral crepitus      X-rays:  X-rays of both knees are ordered and reviewed which show some spurring on the quad insertion of the patellas     Assessment::  Bilateral patellofemoral pain with quad tendinitis    Plan:  Reviewed the findings with her today.  I recommended a Mobic prescription as well as a knee conditioning guide.  Follow-up if not improving.    This note was created using [employer] voice recognition software that occasionally misinterpreted phrases or words.    Consult note is delivered via Epic messaging service.

## 2022-10-20 ENCOUNTER — OFFICE VISIT (OUTPATIENT)
Dept: ENDOCRINOLOGY | Facility: CLINIC | Age: 47
End: 2022-10-20
Payer: COMMERCIAL

## 2022-10-20 VITALS
BODY MASS INDEX: 30.52 KG/M2 | WEIGHT: 218 LBS | DIASTOLIC BLOOD PRESSURE: 80 MMHG | SYSTOLIC BLOOD PRESSURE: 120 MMHG | HEIGHT: 71 IN | TEMPERATURE: 98 F | OXYGEN SATURATION: 96 % | HEART RATE: 63 BPM

## 2022-10-20 DIAGNOSIS — E29.1 MALE HYPOGONADISM: Primary | ICD-10-CM

## 2022-10-20 DIAGNOSIS — E03.9 HYPOTHYROIDISM, UNSPECIFIED TYPE: ICD-10-CM

## 2022-10-20 PROCEDURE — 3074F PR MOST RECENT SYSTOLIC BLOOD PRESSURE < 130 MM HG: ICD-10-PCS | Mod: CPTII,S$GLB,, | Performed by: INTERNAL MEDICINE

## 2022-10-20 PROCEDURE — 3079F PR MOST RECENT DIASTOLIC BLOOD PRESSURE 80-89 MM HG: ICD-10-PCS | Mod: CPTII,S$GLB,, | Performed by: INTERNAL MEDICINE

## 2022-10-20 PROCEDURE — 1160F RVW MEDS BY RX/DR IN RCRD: CPT | Mod: CPTII,S$GLB,, | Performed by: INTERNAL MEDICINE

## 2022-10-20 PROCEDURE — 3079F DIAST BP 80-89 MM HG: CPT | Mod: CPTII,S$GLB,, | Performed by: INTERNAL MEDICINE

## 2022-10-20 PROCEDURE — 1159F PR MEDICATION LIST DOCUMENTED IN MEDICAL RECORD: ICD-10-PCS | Mod: CPTII,S$GLB,, | Performed by: INTERNAL MEDICINE

## 2022-10-20 PROCEDURE — 99999 PR PBB SHADOW E&M-EST. PATIENT-LVL IV: CPT | Mod: PBBFAC,,, | Performed by: INTERNAL MEDICINE

## 2022-10-20 PROCEDURE — 99204 PR OFFICE/OUTPT VISIT, NEW, LEVL IV, 45-59 MIN: ICD-10-PCS | Mod: S$GLB,,, | Performed by: INTERNAL MEDICINE

## 2022-10-20 PROCEDURE — 99204 OFFICE O/P NEW MOD 45 MIN: CPT | Mod: S$GLB,,, | Performed by: INTERNAL MEDICINE

## 2022-10-20 PROCEDURE — 1159F MED LIST DOCD IN RCRD: CPT | Mod: CPTII,S$GLB,, | Performed by: INTERNAL MEDICINE

## 2022-10-20 PROCEDURE — 3074F SYST BP LT 130 MM HG: CPT | Mod: CPTII,S$GLB,, | Performed by: INTERNAL MEDICINE

## 2022-10-20 PROCEDURE — 1160F PR REVIEW ALL MEDS BY PRESCRIBER/CLIN PHARMACIST DOCUMENTED: ICD-10-PCS | Mod: CPTII,S$GLB,, | Performed by: INTERNAL MEDICINE

## 2022-10-20 PROCEDURE — 99999 PR PBB SHADOW E&M-EST. PATIENT-LVL IV: ICD-10-PCS | Mod: PBBFAC,,, | Performed by: INTERNAL MEDICINE

## 2022-10-20 RX ORDER — TESTOSTERONE CYPIONATE 200 MG/ML
300 INJECTION, SOLUTION INTRAMUSCULAR
Qty: 10 ML | Refills: 1 | Status: SHIPPED | OUTPATIENT
Start: 2022-10-20 | End: 2022-12-23 | Stop reason: SDUPTHER

## 2022-10-20 NOTE — PROGRESS NOTES
Subjective:      Chief Complaint: Hypogonadism    HPI: Khadar Lorenzo is a 46 y.o. male who is here for an initial evaluation for testosterone.    With regards to hypogonadism:    Pt reports dx about 10 years. Unknown etiology.   Don't have initial evaluation records available.    Treatment so far: testosterone injections. Different doses over the years    Risks: potential AGGIE, obesity    Reproductive hx:  No prior h/o hypogonadism.  Normal development.  Not trying for kids.  Imaging: empty sella noted 8/5/2016    No h/o adrenal tumors  No h/o liver and kidney disease  No h/o hyperthyroidism (has hypothyroidism)    No medications or herbal products associated with hypogonadism or gynecomastia.  Pt denies taking performance-enhancing drugs.    Current regimen:    300 mg testosterone injection q14 days. Shots on Wednesday, labs on Monday 2 days before the next shot is due.    Has hypothyroidism  Levothyroxine 75 mcg/day    Lab Results   Component Value Date    TSH 2.159 06/06/2022    [id number] 109 12/20/2013    FREET4 0.84 01/25/2017     Pt denied hx AGGIE  Chart mentioned yes AGGIE    Today, pt reports feeling okay overall.  +fatigue    Feels better for a few days after the shot, then declines.    Lower libido  ED improves with cialis.  Having morning erections    Weight up/down, overall pretty steady      Reviewed past medical, family, social history and updated as appropriate.    Review of Systems  As above    Objective:     Vitals:    10/20/22 0842   BP: 120/80   Pulse: 63   Temp: 98.2 °F (36.8 °C)     BP Readings from Last 5 Encounters:   10/20/22 120/80   10/13/22 126/78   06/23/22 124/82   05/02/22 132/76   03/21/22 121/82     Physical Exam  Vitals reviewed.   Constitutional:       General: He is not in acute distress.     Appearance: He is obese.   Neck:      Thyroid: No thyromegaly.   Cardiovascular:      Heart sounds: Normal heart sounds.   Pulmonary:      Effort: Pulmonary effort is normal.     Wt  Readings from Last 5 Encounters:   10/20/22 0842 98.9 kg (218 lb)   10/19/22 1252 96.2 kg (212 lb)   10/13/22 1106 96.6 kg (212 lb 15.4 oz)   06/23/22 1432 94.2 kg (207 lb 10.8 oz)   05/02/22 1043 97 kg (213 lb 13.5 oz)     No results found for: HGBA1C  Lab Results   Component Value Date    CHOL 152 06/06/2022    CHOL 138 10/25/2021    HDL 28 (L) 06/06/2022    HDL 28 (L) 10/25/2021    LDLCALC 88.2 06/06/2022    LDLCALC 66.0 10/25/2021    TRIG 179 (H) 06/06/2022    TRIG 220 (H) 10/25/2021    CHOLHDL 18.4 (L) 06/06/2022    CHOLHDL 20.3 10/25/2021     Lab Results   Component Value Date     06/06/2022    K 4.3 06/06/2022     06/06/2022    CO2 24 06/06/2022     06/06/2022    BUN 9 06/06/2022    CREATININE 1.1 06/06/2022    CALCIUM 9.1 06/06/2022    PROT 6.6 06/06/2022    ALBUMIN 4.2 06/06/2022    BILITOT 0.7 06/06/2022    ALKPHOS 54 (L) 06/06/2022    AST 18 06/06/2022    ALT 24 06/06/2022    ANIONGAP 9 06/06/2022    ESTGFRAFRICA >60.0 06/06/2022    EGFRNONAA >60.0 06/06/2022    TSH 2.159 06/06/2022      No results found for: MICALBCREAT    Assessment/Plan:     Male hypogonadism  Long hx hypogonadism.   no records of initial workup available. Had empty sella vs partially empty sella on prior imaging, though that doesn't usually cause any hormone issues.   been on treatment so long even stopping may not be sufficient for a diagnosis  Reviewed risks associated with testosterone treatment.   not trying for more kids.   PSA, hemoglobin okay    Last level a bit low.   feels like symptoms are okay for a bit then get worse towards the next injection   increase to q10 days dosing.  Recheck level in 2 months, ideally mid-way between shots.  Consider other adjustments as needed based on results      Hypothyroid  Lab Results   Component Value Date    TSH 2.159 06/06/2022    T7FEZCS 109 12/20/2013    FREET4 0.84 01/25/2017     Last TSH normal   continue same regimen   f/u with PCP, monitor from time to  time      Follow up in about 6 months (around 4/20/2023) for lab review, further monitoring.      Khai Horner MD  Endocrinology

## 2022-10-20 NOTE — ASSESSMENT & PLAN NOTE
Lab Results   Component Value Date    TSH 2.159 06/06/2022    F6NSXNZ 109 12/20/2013    FREET4 0.84 01/25/2017     Last TSH normal   continue same regimen   f/u with PCP, monitor from time to time

## 2022-10-20 NOTE — ASSESSMENT & PLAN NOTE
Long hx hypogonadism.   no records of initial workup available. Had empty sella vs partially empty sella on prior imaging, though that doesn't usually cause any hormone issues.   been on treatment so long even stopping may not be sufficient for a diagnosis  Reviewed risks associated with testosterone treatment.   not trying for more kids.   PSA, hemoglobin okay    Last level a bit low.   feels like symptoms are okay for a bit then get worse towards the next injection   increase to q10 days dosing.  Recheck level in 2 months, ideally mid-way between shots.  Consider other adjustments as needed based on results

## 2022-11-28 ENCOUNTER — PATIENT MESSAGE (OUTPATIENT)
Dept: FAMILY MEDICINE | Facility: CLINIC | Age: 47
End: 2022-11-28

## 2022-11-28 ENCOUNTER — OFFICE VISIT (OUTPATIENT)
Dept: FAMILY MEDICINE | Facility: CLINIC | Age: 47
End: 2022-11-28
Payer: COMMERCIAL

## 2022-11-28 VITALS
BODY MASS INDEX: 29.43 KG/M2 | HEIGHT: 71 IN | RESPIRATION RATE: 18 BRPM | SYSTOLIC BLOOD PRESSURE: 132 MMHG | DIASTOLIC BLOOD PRESSURE: 76 MMHG | WEIGHT: 210.19 LBS

## 2022-11-28 DIAGNOSIS — H66.002 NON-RECURRENT ACUTE SUPPURATIVE OTITIS MEDIA OF LEFT EAR WITHOUT SPONTANEOUS RUPTURE OF TYMPANIC MEMBRANE: ICD-10-CM

## 2022-11-28 DIAGNOSIS — B96.89 ACUTE BACTERIAL SINUSITIS: Primary | ICD-10-CM

## 2022-11-28 DIAGNOSIS — J01.90 ACUTE BACTERIAL SINUSITIS: Primary | ICD-10-CM

## 2022-11-28 PROCEDURE — 99999 PR PBB SHADOW E&M-EST. PATIENT-LVL III: CPT | Mod: PBBFAC,,, | Performed by: STUDENT IN AN ORGANIZED HEALTH CARE EDUCATION/TRAINING PROGRAM

## 2022-11-28 PROCEDURE — 1159F MED LIST DOCD IN RCRD: CPT | Mod: CPTII,S$GLB,, | Performed by: STUDENT IN AN ORGANIZED HEALTH CARE EDUCATION/TRAINING PROGRAM

## 2022-11-28 PROCEDURE — 3078F PR MOST RECENT DIASTOLIC BLOOD PRESSURE < 80 MM HG: ICD-10-PCS | Mod: CPTII,S$GLB,, | Performed by: STUDENT IN AN ORGANIZED HEALTH CARE EDUCATION/TRAINING PROGRAM

## 2022-11-28 PROCEDURE — 3075F SYST BP GE 130 - 139MM HG: CPT | Mod: CPTII,S$GLB,, | Performed by: STUDENT IN AN ORGANIZED HEALTH CARE EDUCATION/TRAINING PROGRAM

## 2022-11-28 PROCEDURE — 99999 PR PBB SHADOW E&M-EST. PATIENT-LVL III: ICD-10-PCS | Mod: PBBFAC,,, | Performed by: STUDENT IN AN ORGANIZED HEALTH CARE EDUCATION/TRAINING PROGRAM

## 2022-11-28 PROCEDURE — 99213 OFFICE O/P EST LOW 20 MIN: CPT | Mod: S$GLB,,, | Performed by: STUDENT IN AN ORGANIZED HEALTH CARE EDUCATION/TRAINING PROGRAM

## 2022-11-28 PROCEDURE — 1159F PR MEDICATION LIST DOCUMENTED IN MEDICAL RECORD: ICD-10-PCS | Mod: CPTII,S$GLB,, | Performed by: STUDENT IN AN ORGANIZED HEALTH CARE EDUCATION/TRAINING PROGRAM

## 2022-11-28 PROCEDURE — 99213 PR OFFICE/OUTPT VISIT, EST, LEVL III, 20-29 MIN: ICD-10-PCS | Mod: S$GLB,,, | Performed by: STUDENT IN AN ORGANIZED HEALTH CARE EDUCATION/TRAINING PROGRAM

## 2022-11-28 PROCEDURE — 1160F PR REVIEW ALL MEDS BY PRESCRIBER/CLIN PHARMACIST DOCUMENTED: ICD-10-PCS | Mod: CPTII,S$GLB,, | Performed by: STUDENT IN AN ORGANIZED HEALTH CARE EDUCATION/TRAINING PROGRAM

## 2022-11-28 PROCEDURE — 3008F BODY MASS INDEX DOCD: CPT | Mod: CPTII,S$GLB,, | Performed by: STUDENT IN AN ORGANIZED HEALTH CARE EDUCATION/TRAINING PROGRAM

## 2022-11-28 PROCEDURE — 3075F PR MOST RECENT SYSTOLIC BLOOD PRESS GE 130-139MM HG: ICD-10-PCS | Mod: CPTII,S$GLB,, | Performed by: STUDENT IN AN ORGANIZED HEALTH CARE EDUCATION/TRAINING PROGRAM

## 2022-11-28 PROCEDURE — 1160F RVW MEDS BY RX/DR IN RCRD: CPT | Mod: CPTII,S$GLB,, | Performed by: STUDENT IN AN ORGANIZED HEALTH CARE EDUCATION/TRAINING PROGRAM

## 2022-11-28 PROCEDURE — 3078F DIAST BP <80 MM HG: CPT | Mod: CPTII,S$GLB,, | Performed by: STUDENT IN AN ORGANIZED HEALTH CARE EDUCATION/TRAINING PROGRAM

## 2022-11-28 PROCEDURE — 3008F PR BODY MASS INDEX (BMI) DOCUMENTED: ICD-10-PCS | Mod: CPTII,S$GLB,, | Performed by: STUDENT IN AN ORGANIZED HEALTH CARE EDUCATION/TRAINING PROGRAM

## 2022-11-28 RX ORDER — AMOXICILLIN AND CLAVULANATE POTASSIUM 875; 125 MG/1; MG/1
1 TABLET, FILM COATED ORAL EVERY 12 HOURS
Qty: 10 TABLET | Refills: 0 | Status: SHIPPED | OUTPATIENT
Start: 2022-11-28 | End: 2022-12-03

## 2022-11-28 RX ORDER — GUAIFENESIN/DEXTROMETHORPHAN 100-10MG/5
5 SYRUP ORAL EVERY 6 HOURS PRN
Qty: 473 ML | Refills: 0 | Status: SHIPPED | OUTPATIENT
Start: 2022-11-28 | End: 2022-12-08

## 2022-11-28 NOTE — PROGRESS NOTES
"Subjective:      Patient ID: Khadar Lorenzo is a 46 y.o. male    Chief Complaint:  Upper respiratory symptoms    HPI  46 y.o. male with a PMHx as documented below presents to clinic today for URI symptoms.     He reports that symptoms first started 5 days ago.    He has been having runny nose, congestion, cough, chills, sinus pain, and ear pressure. He denies having any fever and chills.    Past Medical History:   Diagnosis Date    Anxiety     Depression     GERD (gastroesophageal reflux disease)     Hyperlipidemia     Hypotestosteronemia     Hypothyroidism     Nephrolithiasis     2 episodes    Obesity     Snoring     Syncope and collapse       ROS completed and negative unless otherwise mentioned above in HPI.    Objective:      Vitals:    11/28/22 0847   BP: 132/76   BP Location: Right arm   Patient Position: Sitting   Resp: 18   Weight: 95.4 kg (210 lb 3.3 oz)   Height: 5' 11" (1.803 m)     Physical Exam  Vitals reviewed.   Constitutional:       General: He is not in acute distress.  HENT:      Head: Normocephalic and atraumatic.      Right Ear: A middle ear effusion is present. Tympanic membrane is erythematous. Tympanic membrane is not bulging.      Left Ear: A middle ear effusion is present. Tympanic membrane is erythematous and bulging.      Nose: Congestion present.      Right Sinus: Maxillary sinus tenderness and frontal sinus tenderness present.      Left Sinus: Maxillary sinus tenderness and frontal sinus tenderness present.      Mouth/Throat:      Pharynx: Posterior oropharyngeal erythema present. No oropharyngeal exudate.   Cardiovascular:      Rate and Rhythm: Normal rate.   Pulmonary:      Effort: Pulmonary effort is normal. No respiratory distress.      Breath sounds: No wheezing or rales.   Neurological:      General: No focal deficit present.      Mental Status: He is alert and oriented to person, place, and time. Mental status is at baseline.      Assessment:       1. Acute bacterial " sinusitis    2. Non-recurrent acute suppurative otitis media of left ear without spontaneous rupture of tympanic membrane      Plan:       Khadar was seen today for nasal congestion and fatigue.    Diagnoses and all orders for this visit:    Acute bacterial sinusitis  -     amoxicillin-clavulanate 875-125mg (AUGMENTIN) 875-125 mg per tablet; Take 1 tablet by mouth every 12 (twelve) hours. for 5 days  -     dextromethorphan-guaiFENesin  mg/5 ml (ROBITUSSIN-DM)  mg/5 mL liquid; Take 5 mLs by mouth every 6 (six) hours as needed (Cough/congestion).    Non-recurrent acute suppurative otitis media of left ear without spontaneous rupture of tympanic membrane  -     amoxicillin-clavulanate 875-125mg (AUGMENTIN) 875-125 mg per tablet; Take 1 tablet by mouth every 12 (twelve) hours. for 5 days  -     dextromethorphan-guaiFENesin  mg/5 ml (ROBITUSSIN-DM)  mg/5 mL liquid; Take 5 mLs by mouth every 6 (six) hours as needed (Cough/congestion).    Discussed symptomatic management with OTC meds and saline rinses (prescription medications as indicated). Patient advised to let us know if symptoms persist or if he develops any new or worsening symptoms.      Joslyn Stoddard MD  [facility]  Office: [phone]   Fax: [phone]  11/28/2022      Disclaimer: This note was partly generated using dictation software which may occasionally result in transcription errors.

## 2022-11-28 NOTE — LETTER
November 28, 2022      [facility]  [address] [place] [address]  Phone: [phone]  Fax: [phone]       Patient: Khadar Lorenzo   YOB: 1975  Date of Visit: 11/28/2022    To Whom It May Concern:    Pk Lorenzo was at [facility] on 11/28/2022. The patient may return to work 11/28/22 with restrictions. If you have any questions or concerns, or if I can be of further assistance, please do not hesitate to contact me.    Sincerely,        Joslyn Stoddard MD

## 2022-11-30 ENCOUNTER — TELEPHONE (OUTPATIENT)
Dept: FAMILY MEDICINE | Facility: CLINIC | Age: 47
End: 2022-11-30
Payer: COMMERCIAL

## 2022-11-30 NOTE — TELEPHONE ENCOUNTER
----- Message from Talita Strickland NP sent at 11/30/2022  4:22 PM CST -----  Contact: [phone]  Looks like this was taken care of by the provider who saw him. Is there anything else he needs from me?   ----- Message -----  From: Grazyna Shannon MA  Sent: 11/29/2022   2:26 PM CST  To: Talita Strickland NP     Please advise    ----- Message -----  From: Lashanda Hollis  Sent: 11/28/2022   3:08 PM CST  To: [facility]i [facility]orgensen Staff    Type: Needs Medical Advice  Who Called:  Pt     Best Call Back Number: [phone]    Additional Information: Pt stated symptoms are getting worse after visit today. Pt asking for a call back from office about going back to work.

## 2022-12-20 ENCOUNTER — LAB VISIT (OUTPATIENT)
Dept: LAB | Facility: HOSPITAL | Age: 47
End: 2022-12-20
Attending: INTERNAL MEDICINE
Payer: COMMERCIAL

## 2022-12-20 DIAGNOSIS — E29.1 MALE HYPOGONADISM: ICD-10-CM

## 2022-12-20 LAB
BASOPHILS # BLD AUTO: 0.07 K/UL (ref 0–0.2)
BASOPHILS NFR BLD: 1 % (ref 0–1.9)
DIFFERENTIAL METHOD: ABNORMAL
EOSINOPHIL # BLD AUTO: 0.1 K/UL (ref 0–0.5)
EOSINOPHIL NFR BLD: 2 % (ref 0–8)
ERYTHROCYTE [DISTWIDTH] IN BLOOD BY AUTOMATED COUNT: 13.7 % (ref 11.5–14.5)
HCT VFR BLD AUTO: 52.9 % (ref 40–54)
HGB BLD-MCNC: 16.5 G/DL (ref 14–18)
IMM GRANULOCYTES # BLD AUTO: 0.03 K/UL (ref 0–0.04)
IMM GRANULOCYTES NFR BLD AUTO: 0.4 % (ref 0–0.5)
LYMPHOCYTES # BLD AUTO: 2.4 K/UL (ref 1–4.8)
LYMPHOCYTES NFR BLD: 33.6 % (ref 18–48)
MCH RBC QN AUTO: 26.7 PG (ref 27–31)
MCHC RBC AUTO-ENTMCNC: 31.2 G/DL (ref 32–36)
MCV RBC AUTO: 86 FL (ref 82–98)
MONOCYTES # BLD AUTO: 0.7 K/UL (ref 0.3–1)
MONOCYTES NFR BLD: 9.9 % (ref 4–15)
NEUTROPHILS # BLD AUTO: 3.8 K/UL (ref 1.8–7.7)
NEUTROPHILS NFR BLD: 53.1 % (ref 38–73)
NRBC BLD-RTO: 0 /100 WBC
PLATELET # BLD AUTO: 222 K/UL (ref 150–450)
PMV BLD AUTO: 9.5 FL (ref 9.2–12.9)
RBC # BLD AUTO: 6.18 M/UL (ref 4.6–6.2)
TESTOST SERPL-MCNC: >1500 NG/DL (ref 304–1227)
WBC # BLD AUTO: 7.14 K/UL (ref 3.9–12.7)

## 2022-12-20 PROCEDURE — 84403 ASSAY OF TOTAL TESTOSTERONE: CPT | Performed by: INTERNAL MEDICINE

## 2022-12-20 PROCEDURE — 36415 COLL VENOUS BLD VENIPUNCTURE: CPT | Performed by: INTERNAL MEDICINE

## 2022-12-20 PROCEDURE — 85025 COMPLETE CBC W/AUTO DIFF WBC: CPT | Performed by: INTERNAL MEDICINE

## 2022-12-23 DIAGNOSIS — E29.1 MALE HYPOGONADISM: ICD-10-CM

## 2022-12-23 RX ORDER — TESTOSTERONE CYPIONATE 200 MG/ML
200 INJECTION, SOLUTION INTRAMUSCULAR
Qty: 10 ML | Refills: 1 | Status: SHIPPED | OUTPATIENT
Start: 2022-12-23 | End: 2023-05-10 | Stop reason: SDUPTHER

## 2023-02-03 ENCOUNTER — OCCUPATIONAL HEALTH (OUTPATIENT)
Dept: URGENT CARE | Facility: CLINIC | Age: 48
End: 2023-02-03

## 2023-02-03 DIAGNOSIS — Z00.00 ENCOUNTER FOR PHYSICAL EXAMINATION: Primary | ICD-10-CM

## 2023-02-03 PROCEDURE — 99499 UNLISTED E&M SERVICE: CPT | Mod: S$GLB,,, | Performed by: NURSE PRACTITIONER

## 2023-02-03 PROCEDURE — 99499 COAST GUARD PHYSICAL: ICD-10-PCS | Mod: S$GLB,,, | Performed by: NURSE PRACTITIONER

## 2023-03-06 ENCOUNTER — PATIENT MESSAGE (OUTPATIENT)
Dept: ENDOCRINOLOGY | Facility: CLINIC | Age: 48
End: 2023-03-06
Payer: COMMERCIAL

## 2023-03-07 ENCOUNTER — LAB VISIT (OUTPATIENT)
Dept: LAB | Facility: HOSPITAL | Age: 48
End: 2023-03-07
Attending: INTERNAL MEDICINE
Payer: COMMERCIAL

## 2023-03-07 ENCOUNTER — TELEPHONE (OUTPATIENT)
Dept: ENDOCRINOLOGY | Facility: CLINIC | Age: 48
End: 2023-03-07
Payer: COMMERCIAL

## 2023-03-07 DIAGNOSIS — E29.1 MALE HYPOGONADISM: ICD-10-CM

## 2023-03-07 LAB
BASOPHILS # BLD AUTO: 0.07 K/UL (ref 0–0.2)
BASOPHILS NFR BLD: 1 % (ref 0–1.9)
DIFFERENTIAL METHOD: ABNORMAL
EOSINOPHIL # BLD AUTO: 0.1 K/UL (ref 0–0.5)
EOSINOPHIL NFR BLD: 1.5 % (ref 0–8)
ERYTHROCYTE [DISTWIDTH] IN BLOOD BY AUTOMATED COUNT: 13.6 % (ref 11.5–14.5)
HCT VFR BLD AUTO: 52.1 % (ref 40–54)
HGB BLD-MCNC: 16.9 G/DL (ref 14–18)
IMM GRANULOCYTES # BLD AUTO: 0.02 K/UL (ref 0–0.04)
IMM GRANULOCYTES NFR BLD AUTO: 0.3 % (ref 0–0.5)
LYMPHOCYTES # BLD AUTO: 2.5 K/UL (ref 1–4.8)
LYMPHOCYTES NFR BLD: 37.1 % (ref 18–48)
MCH RBC QN AUTO: 27.1 PG (ref 27–31)
MCHC RBC AUTO-ENTMCNC: 32.4 G/DL (ref 32–36)
MCV RBC AUTO: 84 FL (ref 82–98)
MONOCYTES # BLD AUTO: 0.5 K/UL (ref 0.3–1)
MONOCYTES NFR BLD: 7.8 % (ref 4–15)
NEUTROPHILS # BLD AUTO: 3.5 K/UL (ref 1.8–7.7)
NEUTROPHILS NFR BLD: 52.3 % (ref 38–73)
NRBC BLD-RTO: 0 /100 WBC
PLATELET # BLD AUTO: 189 K/UL (ref 150–450)
PMV BLD AUTO: 9.4 FL (ref 9.2–12.9)
RBC # BLD AUTO: 6.23 M/UL (ref 4.6–6.2)
TESTOST SERPL-MCNC: 776 NG/DL (ref 304–1227)
WBC # BLD AUTO: 6.69 K/UL (ref 3.9–12.7)

## 2023-03-07 PROCEDURE — 84403 ASSAY OF TOTAL TESTOSTERONE: CPT | Performed by: INTERNAL MEDICINE

## 2023-03-07 PROCEDURE — 36415 COLL VENOUS BLD VENIPUNCTURE: CPT | Performed by: INTERNAL MEDICINE

## 2023-03-07 PROCEDURE — 85025 COMPLETE CBC W/AUTO DIFF WBC: CPT | Performed by: INTERNAL MEDICINE

## 2023-03-23 ENCOUNTER — PATIENT MESSAGE (OUTPATIENT)
Dept: ENDOCRINOLOGY | Facility: CLINIC | Age: 48
End: 2023-03-23
Payer: COMMERCIAL

## 2023-03-23 ENCOUNTER — TELEPHONE (OUTPATIENT)
Dept: FAMILY MEDICINE | Facility: CLINIC | Age: 48
End: 2023-03-23
Payer: COMMERCIAL

## 2023-03-23 NOTE — TELEPHONE ENCOUNTER
Called pt and left him a voicemail to please call us back at [phone] as his appt with Talita Strickland NP on 4/12/23 at 9AM needs to be rescheduled.  Talita will be out of the office that day.

## 2023-04-05 ENCOUNTER — OFFICE VISIT (OUTPATIENT)
Dept: FAMILY MEDICINE | Facility: CLINIC | Age: 48
End: 2023-04-05
Payer: COMMERCIAL

## 2023-04-05 VITALS
SYSTOLIC BLOOD PRESSURE: 122 MMHG | WEIGHT: 215.25 LBS | DIASTOLIC BLOOD PRESSURE: 74 MMHG | RESPIRATION RATE: 18 BRPM | HEIGHT: 71 IN | HEART RATE: 99 BPM | OXYGEN SATURATION: 95 % | BODY MASS INDEX: 30.14 KG/M2 | TEMPERATURE: 97 F

## 2023-04-05 DIAGNOSIS — E78.1 HYPERTRIGLYCERIDEMIA: ICD-10-CM

## 2023-04-05 DIAGNOSIS — Z00.00 LABORATORY EXAM ORDERED AS PART OF ROUTINE GENERAL MEDICAL EXAMINATION: ICD-10-CM

## 2023-04-05 DIAGNOSIS — E03.8 SECONDARY HYPOTHYROIDISM: ICD-10-CM

## 2023-04-05 DIAGNOSIS — Z00.00 ANNUAL PHYSICAL EXAM: Primary | ICD-10-CM

## 2023-04-05 DIAGNOSIS — E34.9 HYPOTESTOSTERONEMIA: ICD-10-CM

## 2023-04-05 DIAGNOSIS — Z12.11 COLON CANCER SCREENING: ICD-10-CM

## 2023-04-05 DIAGNOSIS — K21.9 GASTROESOPHAGEAL REFLUX DISEASE WITHOUT ESOPHAGITIS: ICD-10-CM

## 2023-04-05 DIAGNOSIS — E29.1 MALE HYPOGONADISM: ICD-10-CM

## 2023-04-05 DIAGNOSIS — E22.1 HYPERPROLACTINEMIA: ICD-10-CM

## 2023-04-05 DIAGNOSIS — E66.9 CLASS 1 OBESITY WITHOUT SERIOUS COMORBIDITY WITH BODY MASS INDEX (BMI) OF 30.0 TO 30.9 IN ADULT, UNSPECIFIED OBESITY TYPE: ICD-10-CM

## 2023-04-05 PROBLEM — E03.9 HYPOTHYROID: Status: RESOLVED | Noted: 2022-10-20 | Resolved: 2023-04-05

## 2023-04-05 PROCEDURE — 3078F DIAST BP <80 MM HG: CPT | Mod: CPTII,S$GLB,, | Performed by: NURSE PRACTITIONER

## 2023-04-05 PROCEDURE — 1160F RVW MEDS BY RX/DR IN RCRD: CPT | Mod: CPTII,S$GLB,, | Performed by: NURSE PRACTITIONER

## 2023-04-05 PROCEDURE — 99396 PR PREVENTIVE VISIT,EST,40-64: ICD-10-PCS | Mod: S$GLB,,, | Performed by: NURSE PRACTITIONER

## 2023-04-05 PROCEDURE — 1159F MED LIST DOCD IN RCRD: CPT | Mod: CPTII,S$GLB,, | Performed by: NURSE PRACTITIONER

## 2023-04-05 PROCEDURE — 1160F PR REVIEW ALL MEDS BY PRESCRIBER/CLIN PHARMACIST DOCUMENTED: ICD-10-PCS | Mod: CPTII,S$GLB,, | Performed by: NURSE PRACTITIONER

## 2023-04-05 PROCEDURE — 3074F SYST BP LT 130 MM HG: CPT | Mod: CPTII,S$GLB,, | Performed by: NURSE PRACTITIONER

## 2023-04-05 PROCEDURE — 3074F PR MOST RECENT SYSTOLIC BLOOD PRESSURE < 130 MM HG: ICD-10-PCS | Mod: CPTII,S$GLB,, | Performed by: NURSE PRACTITIONER

## 2023-04-05 PROCEDURE — 1159F PR MEDICATION LIST DOCUMENTED IN MEDICAL RECORD: ICD-10-PCS | Mod: CPTII,S$GLB,, | Performed by: NURSE PRACTITIONER

## 2023-04-05 PROCEDURE — 3008F BODY MASS INDEX DOCD: CPT | Mod: CPTII,S$GLB,, | Performed by: NURSE PRACTITIONER

## 2023-04-05 PROCEDURE — 3008F PR BODY MASS INDEX (BMI) DOCUMENTED: ICD-10-PCS | Mod: CPTII,S$GLB,, | Performed by: NURSE PRACTITIONER

## 2023-04-05 PROCEDURE — 3078F PR MOST RECENT DIASTOLIC BLOOD PRESSURE < 80 MM HG: ICD-10-PCS | Mod: CPTII,S$GLB,, | Performed by: NURSE PRACTITIONER

## 2023-04-05 PROCEDURE — 3044F HG A1C LEVEL LT 7.0%: CPT | Mod: CPTII,S$GLB,, | Performed by: NURSE PRACTITIONER

## 2023-04-05 PROCEDURE — 3044F PR MOST RECENT HEMOGLOBIN A1C LEVEL <7.0%: ICD-10-PCS | Mod: CPTII,S$GLB,, | Performed by: NURSE PRACTITIONER

## 2023-04-05 PROCEDURE — 99396 PREV VISIT EST AGE 40-64: CPT | Mod: S$GLB,,, | Performed by: NURSE PRACTITIONER

## 2023-04-05 NOTE — PROGRESS NOTES
Subjective:       Patient ID: Khadar Lorenzo is a 47 y.o. male.    Chief Complaint: Annual Exam (Pt denies any other complaints on today's visit . /)    HPI here for annual exam. States he is doing well. He is due for routine labs. States tolerating medications well.     Multiple chronic issues to review. See ROS/assessment and plan      The following portion of the patients history was reviewed and updated as appropriate: allergies, current medications, past medical and surgical history. Past social history and problem list reviewed. Family PMH and Past social history reviewed. Tobacco, Illicit drug use reviewed.      Review of patient's allergies indicates:  No Known Allergies      Current Outpatient Medications:     cyclobenzaprine (FLEXERIL) 10 MG tablet, Take 10 mg by mouth 3 (three) times daily as needed for Muscle spasms., Disp: , Rfl:     fenofibrate 160 MG Tab, TAKE 1 TABLET DAILY, Disp: 90 tablet, Rfl: 3    levothyroxine (SYNTHROID) 75 MCG tablet, TAKE 1 TABLET BEFORE BREAKFAST, Disp: 90 tablet, Rfl: 3    meloxicam (MOBIC) 15 MG tablet, Take 1 tablet (15 mg total) by mouth daily as needed for Pain., Disp: 30 tablet, Rfl: 2    mupirocin (BACTROBAN) 2 % ointment, Apply to surgical site BID, Disp: 22 g, Rfl: 1    OLUX-E 0.05 % topical foam, Apply 0.05 % topically continuous prn., Disp: , Rfl:     omeprazole (PRILOSEC) 40 MG capsule, TAKE 1 CAPSULE DAILY, Disp: 90 capsule, Rfl: 3    ondansetron (ZOFRAN-ODT) 8 MG TbDL, Take 1 tablet (8 mg total) by mouth every 8 (eight) hours as needed (Nausea)., Disp: 3 tablet, Rfl: 0    tadalafiL (CIALIS) 20 MG Tab, TAKE 1/2 TO 1 TABLET BY MOUTH EVERY 3 DAYS AS NEEDED, Disp: 6 tablet, Rfl: 12    tadalafiL (CIALIS) 5 MG tablet, Take 1 tablet (5 mg total) by mouth daily as needed for Erectile Dysfunction., Disp: 30 tablet, Rfl: 12    testosterone cypionate (DEPOTESTOTERONE CYPIONATE) 200 mg/mL injection, Inject 1 mL (200 mg total) into the muscle every 10 days., Disp:  10 mL, Rfl: 1    triamcinolone acetonide 0.1% (KENALOG) 0.1 % cream, aaa up to BID PRN rash, Disp: 240 g, Rfl: 0    Past Medical History:   Diagnosis Date    Anxiety     Depression     GERD (gastroesophageal reflux disease)     Hyperlipidemia     Hypotestosteronemia     Hypothyroidism     Nephrolithiasis     2 episodes    Obesity     Snoring     Syncope and collapse        Past Surgical History:   Procedure Laterality Date    CYST REMOVAL      left wrist    FRACTURE SURGERY      screw in right thumb    testopel      by Dr. Martini in the past for his hypogonadism.    TONSILLECTOMY, ADENOIDECTOMY, BILATERAL MYRINGOTOMY AND TUBES         Social History     Socioeconomic History    Marital status:    Occupational History    Occupation: [employer]   Tobacco Use    Smoking status: Former    Smokeless tobacco: Current     Types: Chew    Tobacco comments:     chew everyday, 0.25 can    Substance and Sexual Activity    Alcohol use: Yes     Alcohol/week: 1.0 standard drink     Types: 1 Cans of beer per week     Comment: socially    Drug use: No    Sexual activity: Yes     Partners: Female         Review of Systems   Constitutional:  Negative for activity change, appetite change, fatigue and fever.   HENT:  Negative for congestion, rhinorrhea and trouble swallowing.    Eyes:  Negative for visual disturbance.   Respiratory:  Negative for cough, shortness of breath and wheezing.    Cardiovascular:  Negative for chest pain, palpitations and leg swelling.   Gastrointestinal:  Negative for abdominal pain, blood in stool, diarrhea, nausea and vomiting.   Genitourinary:  Negative for difficulty urinating.   Musculoskeletal:  Negative for arthralgias, back pain and gait problem.   Skin:  Negative for rash.   Neurological:  Negative for dizziness, weakness and headaches.   Hematological:  Negative for adenopathy. Does not bruise/bleed easily.   Psychiatric/Behavioral:  Negative for decreased concentration, dysphoric  "mood and sleep disturbance. The patient is not nervous/anxious.      Objective:      /74 (BP Location: Left arm, Patient Position: Sitting)   Pulse 99   Temp 97.3 °F (36.3 °C)   Resp 18   Ht 5' 11" (1.803 m)   Wt 97.7 kg (215 lb 4.5 oz)   SpO2 95%   BMI 30.03 kg/m²      Physical Exam  Constitutional:       Appearance: Normal appearance. He is well-developed. He is obese.   HENT:      Head: Normocephalic.   Eyes:      Conjunctiva/sclera: Conjunctivae normal.      Pupils: Pupils are equal, round, and reactive to light.   Neck:      Thyroid: No thyromegaly.      Vascular: No carotid bruit or JVD.   Cardiovascular:      Rate and Rhythm: Normal rate and regular rhythm.      Pulses: Normal pulses.      Heart sounds: Normal heart sounds and S1 normal. No murmur heard.    No gallop.   Pulmonary:      Effort: Pulmonary effort is normal.      Breath sounds: Normal breath sounds. No decreased breath sounds or wheezing.   Abdominal:      General: Bowel sounds are normal.      Palpations: Abdomen is soft.      Tenderness: There is no abdominal tenderness. There is no right CVA tenderness or left CVA tenderness.   Musculoskeletal:         General: Normal range of motion.      Cervical back: Normal range of motion and neck supple.      Right lower leg: No edema.      Left lower leg: No edema.      Comments: Gait and coordination normal   Skin:     General: Skin is warm and dry.      Capillary Refill: Capillary refill takes less than 2 seconds.      Findings: No rash.   Neurological:      Mental Status: He is alert and oriented to person, place, and time.   Psychiatric:         Attention and Perception: Attention normal.         Mood and Affect: Mood and affect normal.         Speech: Speech normal.         Behavior: Behavior normal.       Assessment:       1. Annual physical exam    2. Secondary hypothyroidism    3. Hypertriglyceridemia    4. Laboratory exam ordered as part of routine general medical examination    5. " Colon cancer screening    6. Male hypogonadism    7. Gastroesophageal reflux disease without esophagitis    8. Hypotestosteronemia    9. Hyperprolactinemia    10. Class 1 obesity without serious comorbidity with body mass index (BMI) of 30.0 to 30.9 in adult, unspecified obesity type        Plan:       Annual physical exam    Secondary hypothyroidism: Will adjust dose of thyroid medication if needed based on lab results   -     TSH; Future; Expected date: 04/05/2023    Hypertriglyceridemia: due for labs.  On fenofibrate daily.  -     Comprehensive Metabolic Panel; Future; Expected date: 04/05/2023  -     Lipid Panel; Future; Expected date: 04/05/2023    Laboratory exam ordered as part of routine general medical examination  -     Hemoglobin A1C; Future; Expected date: 04/05/2023    Colon cancer screening  -     Fecal Immunochemical Test (iFOBT); Future; Expected date: 04/05/2023    Male hypogonadism: followed by Urology. On testosterone replacement    Gastroesophageal reflux disease without esophagitis: stable on Omeprazole    Hypotestosteronemia: followed by Urology. Stable on testosterone replacement.    Hyperprolactinemia: stable.     Class 1 obesity without serious comorbidity with body mass index (BMI) of 30.0 to 30.9 in adult, unspecified obesity type: Weight loss encouraged. Follow low fat, low carb diet. Portion control, exercise.         Continue current medication  Take medications only as prescribed  Healthy diet, exercise  Adequate rest  Adequate hydration  Avoid allergens  Avoid excessive caffeine      Follow up 6 months

## 2023-04-11 ENCOUNTER — LAB VISIT (OUTPATIENT)
Dept: LAB | Facility: HOSPITAL | Age: 48
End: 2023-04-11
Attending: NURSE PRACTITIONER
Payer: COMMERCIAL

## 2023-04-11 DIAGNOSIS — Z00.00 LABORATORY EXAM ORDERED AS PART OF ROUTINE GENERAL MEDICAL EXAMINATION: ICD-10-CM

## 2023-04-11 DIAGNOSIS — E03.8 SECONDARY HYPOTHYROIDISM: ICD-10-CM

## 2023-04-11 DIAGNOSIS — E78.1 HYPERTRIGLYCERIDEMIA: ICD-10-CM

## 2023-04-11 LAB
ALBUMIN SERPL BCP-MCNC: 4.1 G/DL (ref 3.5–5.2)
ALP SERPL-CCNC: 47 U/L (ref 55–135)
ALT SERPL W/O P-5'-P-CCNC: 33 U/L (ref 10–44)
ANION GAP SERPL CALC-SCNC: 10 MMOL/L (ref 8–16)
AST SERPL-CCNC: 27 U/L (ref 10–40)
BILIRUB SERPL-MCNC: 1.1 MG/DL (ref 0.1–1)
BUN SERPL-MCNC: 17 MG/DL (ref 6–20)
CALCIUM SERPL-MCNC: 8.8 MG/DL (ref 8.7–10.5)
CHLORIDE SERPL-SCNC: 104 MMOL/L (ref 95–110)
CHOLEST SERPL-MCNC: 156 MG/DL (ref 120–199)
CHOLEST/HDLC SERPL: 5.2 {RATIO} (ref 2–5)
CO2 SERPL-SCNC: 27 MMOL/L (ref 23–29)
CREAT SERPL-MCNC: 1.1 MG/DL (ref 0.5–1.4)
EST. GFR  (NO RACE VARIABLE): >60 ML/MIN/1.73 M^2
ESTIMATED AVG GLUCOSE: 111 MG/DL (ref 68–131)
GLUCOSE SERPL-MCNC: 97 MG/DL (ref 70–110)
HBA1C MFR BLD: 5.5 % (ref 4–5.6)
HDLC SERPL-MCNC: 30 MG/DL (ref 40–75)
HDLC SERPL: 19.2 % (ref 20–50)
LDLC SERPL CALC-MCNC: 87 MG/DL (ref 63–159)
NONHDLC SERPL-MCNC: 126 MG/DL
POTASSIUM SERPL-SCNC: 4 MMOL/L (ref 3.5–5.1)
PROT SERPL-MCNC: 7 G/DL (ref 6–8.4)
SODIUM SERPL-SCNC: 141 MMOL/L (ref 136–145)
TRIGL SERPL-MCNC: 195 MG/DL (ref 30–150)
TSH SERPL DL<=0.005 MIU/L-ACNC: 1.73 UIU/ML (ref 0.4–4)

## 2023-04-11 PROCEDURE — 80053 COMPREHEN METABOLIC PANEL: CPT | Performed by: NURSE PRACTITIONER

## 2023-04-11 PROCEDURE — 84443 ASSAY THYROID STIM HORMONE: CPT | Performed by: NURSE PRACTITIONER

## 2023-04-11 PROCEDURE — 80061 LIPID PANEL: CPT | Performed by: NURSE PRACTITIONER

## 2023-04-11 PROCEDURE — 36415 COLL VENOUS BLD VENIPUNCTURE: CPT | Performed by: NURSE PRACTITIONER

## 2023-04-11 PROCEDURE — 83036 HEMOGLOBIN GLYCOSYLATED A1C: CPT | Performed by: NURSE PRACTITIONER

## 2023-04-12 ENCOUNTER — PATIENT MESSAGE (OUTPATIENT)
Dept: FAMILY MEDICINE | Facility: CLINIC | Age: 48
End: 2023-04-12

## 2023-04-18 PROBLEM — E66.9 CLASS 1 OBESITY WITHOUT SERIOUS COMORBIDITY WITH BODY MASS INDEX (BMI) OF 30.0 TO 30.9 IN ADULT: Status: ACTIVE | Noted: 2023-04-18

## 2023-04-18 PROBLEM — E66.811 CLASS 1 OBESITY WITHOUT SERIOUS COMORBIDITY WITH BODY MASS INDEX (BMI) OF 30.0 TO 30.9 IN ADULT: Status: ACTIVE | Noted: 2023-04-18

## 2023-05-10 ENCOUNTER — OFFICE VISIT (OUTPATIENT)
Dept: ENDOCRINOLOGY | Facility: CLINIC | Age: 48
End: 2023-05-10
Payer: COMMERCIAL

## 2023-05-10 DIAGNOSIS — E29.1 MALE HYPOGONADISM: Primary | ICD-10-CM

## 2023-05-10 DIAGNOSIS — E03.9 HYPOTHYROIDISM, UNSPECIFIED TYPE: ICD-10-CM

## 2023-05-10 PROCEDURE — 99214 PR OFFICE/OUTPT VISIT, EST, LEVL IV, 30-39 MIN: ICD-10-PCS | Mod: 95,,, | Performed by: INTERNAL MEDICINE

## 2023-05-10 PROCEDURE — 99214 OFFICE O/P EST MOD 30 MIN: CPT | Mod: 95,,, | Performed by: INTERNAL MEDICINE

## 2023-05-10 PROCEDURE — 1159F MED LIST DOCD IN RCRD: CPT | Mod: CPTII,95,, | Performed by: INTERNAL MEDICINE

## 2023-05-10 PROCEDURE — 1160F PR REVIEW ALL MEDS BY PRESCRIBER/CLIN PHARMACIST DOCUMENTED: ICD-10-PCS | Mod: CPTII,95,, | Performed by: INTERNAL MEDICINE

## 2023-05-10 PROCEDURE — 3044F PR MOST RECENT HEMOGLOBIN A1C LEVEL <7.0%: ICD-10-PCS | Mod: CPTII,95,, | Performed by: INTERNAL MEDICINE

## 2023-05-10 PROCEDURE — 3044F HG A1C LEVEL LT 7.0%: CPT | Mod: CPTII,95,, | Performed by: INTERNAL MEDICINE

## 2023-05-10 PROCEDURE — 1160F RVW MEDS BY RX/DR IN RCRD: CPT | Mod: CPTII,95,, | Performed by: INTERNAL MEDICINE

## 2023-05-10 PROCEDURE — 1159F PR MEDICATION LIST DOCUMENTED IN MEDICAL RECORD: ICD-10-PCS | Mod: CPTII,95,, | Performed by: INTERNAL MEDICINE

## 2023-05-10 RX ORDER — TADALAFIL 20 MG/1
TABLET ORAL
Qty: 10 TABLET | Refills: 11 | Status: SHIPPED | OUTPATIENT
Start: 2023-05-10 | End: 2023-12-11

## 2023-05-10 RX ORDER — TESTOSTERONE CYPIONATE 200 MG/ML
200 INJECTION, SOLUTION INTRAMUSCULAR
Qty: 10 ML | Refills: 1 | Status: SHIPPED | OUTPATIENT
Start: 2023-05-10 | End: 2023-12-11 | Stop reason: SDUPTHER

## 2023-05-10 RX ORDER — TADALAFIL 5 MG/1
5 TABLET ORAL DAILY PRN
Qty: 30 TABLET | Refills: 11 | Status: SHIPPED | OUTPATIENT
Start: 2023-05-10 | End: 2023-12-11

## 2023-05-10 NOTE — PROGRESS NOTES
The patient location is: [place]  The chief complaint leading to consultation is: testosterone    Visit type: audiovisual    Face to Face time with patient: 13 minutes  20 minutes of total time spent on the encounter, which includes face to face time and non-face to face time preparing to see the patient (eg, review of tests), Obtaining and/or reviewing separately obtained history, Documenting clinical information in the electronic or other health record, Independently interpreting results (not separately reported) and communicating results to the patient/family/caregiver, or Care coordination (not separately reported).    Each patient to whom he or she provides medical services by telemedicine is:  (1) informed of the relationship between the physician and patient and the respective role of any other health care provider with respect to management of the patient; and (2) notified that he or she may decline to receive medical services by telemedicine and may withdraw from such care at any time.    Notes:     Subjective:      Chief Complaint: No chief complaint on file.    HPI: Khadar Lorenzo is a 47 y.o. male who is here for an evaluation for testosterone. Last seen 10/20/2022    With regards to hypogonadism:  Pt reports dx about 10 years. Unknown etiology.   Don't have initial evaluation records available.    Treatment so far: testosterone injections. Different doses over the years    Risks: potential AGGIE, obesity    Reproductive hx:  No prior h/o hypogonadism.  Normal development.  Not trying for kids.  Imaging: empty sella noted 8/5/2016    No h/o adrenal tumors  No h/o liver and kidney disease  No h/o hyperthyroidism (has hypothyroidism)    No medications or herbal products associated with hypogonadism or gynecomastia.  Pt denies taking performance-enhancing drugs.    Current regimen:    200 mg testosterone injection q10 days.    Has hypothyroidism  Levothyroxine 75 mcg/day    Lab Results   Component Value  Date    TSH 1.732 04/11/2023    H0HAINI 109 12/20/2013    FREET4 0.84 01/25/2017     Today, pt reports feeling okay overall.  Energy okay  Libido    ED improves with cialis.  Having morning erections  No palpitations   No excessive headaches    Weight up/down, overall pretty steady      Reviewed past medical, family, social history and updated as appropriate.    Review of Systems  As above    Objective:     There were no vitals filed for this visit.  Prior vitals:    BP Readings from Last 5 Encounters:   04/05/23 122/74   11/28/22 132/76   10/20/22 120/80   10/13/22 126/78   06/23/22 124/82     Physical Exam  Constitutional:       General: He is not in acute distress.  Pulmonary:      Effort: Pulmonary effort is normal.     Wt Readings from Last 5 Encounters:   04/05/23 1502 97.7 kg (215 lb 4.5 oz)   11/28/22 0847 95.4 kg (210 lb 3.3 oz)   10/20/22 0842 98.9 kg (218 lb)   10/19/22 1252 96.2 kg (212 lb)   10/13/22 1106 96.6 kg (212 lb 15.4 oz)     Lab Results   Component Value Date    HGBA1C 5.5 04/11/2023     Lab Results   Component Value Date    CHOL 156 04/11/2023    CHOL 152 06/06/2022    HDL 30 (L) 04/11/2023    HDL 28 (L) 06/06/2022    LDLCALC 87.0 04/11/2023    LDLCALC 88.2 06/06/2022    TRIG 195 (H) 04/11/2023    TRIG 179 (H) 06/06/2022    CHOLHDL 19.2 (L) 04/11/2023    CHOLHDL 18.4 (L) 06/06/2022     Lab Results   Component Value Date     04/11/2023    K 4.0 04/11/2023     04/11/2023    CO2 27 04/11/2023    GLU 97 04/11/2023    BUN 17 04/11/2023    CREATININE 1.1 04/11/2023    CALCIUM 8.8 04/11/2023    PROT 7.0 04/11/2023    ALBUMIN 4.1 04/11/2023    BILITOT 1.1 (H) 04/11/2023    ALKPHOS 47 (L) 04/11/2023    AST 27 04/11/2023    ALT 33 04/11/2023    ANIONGAP 10 04/11/2023    ESTGFRAFRICA >60.0 06/06/2022    EGFRNONAA >60.0 06/06/2022    TSH 1.732 04/11/2023      No results found for: MICALBCREAT    Assessment/Plan:     Male hypogonadism  Long hx hypogonadism.   no records of initial workup  available. Had empty sella vs partially empty sella on prior imaging, though that doesn't usually cause any hormone issues.   been on treatment so long even stopping may not be sufficient for a diagnosis  Had reviewed risks associated with testosterone treatment.   not trying for more kids.   PSA, hemoglobin okay    Last level normal  Clinically, pt reports symptoms doing well.  Continue some regimen. 200 mg q10 days    Monitor testosterone and CBC twice yearly, PSA once yearly. Further adjustments to regimen as needed based on results      Hypothyroidism  Lab Results   Component Value Date    TSH 1.732 04/11/2023    P6VLMMB 109 12/20/2013    FREET4 0.84 01/25/2017     Last labs normal, denies most symptoms, continue same regimen   f/u with PCP, monitor labs from time to time      Discussed with pt, he can check if PCP if okay with monitoring/refilling testosterone. Otherwise, Follow up in about 6 months (around 11/10/2023) for further monitoring, lab review.      Khai Horner MD  Endocrinology

## 2023-05-10 NOTE — ASSESSMENT & PLAN NOTE
Lab Results   Component Value Date    TSH 1.732 04/11/2023    E4RLGLV 109 12/20/2013    FREET4 0.84 01/25/2017     Last labs normal, denies most symptoms, continue same regimen   f/u with PCP, monitor labs from time to time

## 2023-05-10 NOTE — ASSESSMENT & PLAN NOTE
Long hx hypogonadism.   no records of initial workup available. Had empty sella vs partially empty sella on prior imaging, though that doesn't usually cause any hormone issues.   been on treatment so long even stopping may not be sufficient for a diagnosis  Had reviewed risks associated with testosterone treatment.   not trying for more kids.   PSA, hemoglobin okay    Last level normal  Clinically, pt reports symptoms doing well.  Continue some regimen. 200 mg q10 days    Monitor testosterone and CBC twice yearly, PSA once yearly. Further adjustments to regimen as needed based on results

## 2023-07-19 NOTE — PROGRESS NOTES
Subjective:       Patient ID: Khadar Lorenzo is a 47 y.o. male.    Chief Complaint: Cyst (Back of head )    HPI here with concerns for lump to back of his scalp. States his  noticed it and told him she felt it was getting larger. He denies any pain. States he didn't know it was there until she told him. States he sometimes has tightness to back of his neck in the area of the lump. No other concerns. See ROS    The following portion of the patients history was reviewed and updated as appropriate: allergies, current medications, past medical and surgical history. Past social history and problem list reviewed. Family PMH and Past social history reviewed. Tobacco, Illicit drug use reviewed.      Review of patient's allergies indicates:  No Known Allergies      Current Outpatient Medications:     cyclobenzaprine (FLEXERIL) 10 MG tablet, Take 10 mg by mouth 3 (three) times daily as needed for Muscle spasms., Disp: , Rfl:     fenofibrate 160 MG Tab, TAKE 1 TABLET DAILY, Disp: 90 tablet, Rfl: 3    levothyroxine (SYNTHROID) 75 MCG tablet, TAKE 1 TABLET BEFORE BREAKFAST, Disp: 90 tablet, Rfl: 3    meloxicam (MOBIC) 15 MG tablet, Take 1 tablet (15 mg total) by mouth daily as needed for Pain., Disp: 30 tablet, Rfl: 2    mupirocin (BACTROBAN) 2 % ointment, Apply to surgical site BID, Disp: 22 g, Rfl: 1    OLUX-E 0.05 % topical foam, Apply 0.05 % topically continuous prn., Disp: , Rfl:     omeprazole (PRILOSEC) 40 MG capsule, TAKE 1 CAPSULE DAILY, Disp: 90 capsule, Rfl: 3    ondansetron (ZOFRAN-ODT) 8 MG TbDL, Take 1 tablet (8 mg total) by mouth every 8 (eight) hours as needed (Nausea)., Disp: 3 tablet, Rfl: 0    tadalafiL (CIALIS) 20 MG Tab, TAKE 1/2 TO 1 TABLET BY MOUTH EVERY 3 DAYS AS NEEDED, Disp: 10 tablet, Rfl: 11    tadalafiL (CIALIS) 5 MG tablet, Take 1 tablet (5 mg total) by mouth daily as needed for Erectile Dysfunction., Disp: 30 tablet, Rfl: 11    testosterone cypionate (DEPOTESTOTERONE CYPIONATE)  "200 mg/mL injection, Inject 1 mL (200 mg total) into the muscle every 10 days., Disp: 10 mL, Rfl: 1    triamcinolone acetonide 0.1% (KENALOG) 0.1 % cream, aaa up to BID PRN rash, Disp: 240 g, Rfl: 0    Past Medical History:   Diagnosis Date    Anxiety     Depression     GERD (gastroesophageal reflux disease)     Hyperlipidemia     Hypotestosteronemia     Hypothyroidism     Nephrolithiasis     2 episodes    Obesity     Snoring     Syncope and collapse        Past Surgical History:   Procedure Laterality Date    CYST REMOVAL      left wrist    FRACTURE SURGERY      screw in right thumb    testopel      by Dr. Martini in the past for his hypogonadism.    TONSILLECTOMY, ADENOIDECTOMY, BILATERAL MYRINGOTOMY AND TUBES         Social History     Socioeconomic History    Marital status:    Occupational History    Occupation: [employer]   Tobacco Use    Smoking status: Former    Smokeless tobacco: Current     Types: Chew    Tobacco comments:     chew everyday, 0.25 can    Substance and Sexual Activity    Alcohol use: Yes     Alcohol/week: 1.0 standard drink     Types: 1 Cans of beer per week     Comment: socially    Drug use: No    Sexual activity: Yes     Partners: Female       Review of Systems   Constitutional:  Negative for fatigue and fever.   Respiratory:  Negative for cough, chest tightness, shortness of breath and wheezing.    Cardiovascular:  Negative for chest pain and palpitations.   Gastrointestinal:  Negative for abdominal pain, diarrhea, nausea and vomiting.   Musculoskeletal:  Negative for arthralgias.   Skin:         Lump to posterior left scalp area.   Neurological:  Negative for headaches.   Psychiatric/Behavioral:  Negative for dysphoric mood and sleep disturbance. The patient is not nervous/anxious.      Objective:      /86   Pulse 73   Temp 97.2 °F (36.2 °C) (Temporal)   Resp 16   Ht 5' 11" (1.803 m)   Wt 96.7 kg (213 lb 3 oz)   SpO2 97%   BMI 29.73 kg/m²      Physical " Exam  Constitutional:       Appearance: Normal appearance.   HENT:      Head: Normocephalic.   Neck:      Thyroid: No thyromegaly.      Vascular: No carotid bruit.     Cardiovascular:      Rate and Rhythm: Normal rate and regular rhythm.      Pulses: Normal pulses.      Heart sounds: Normal heart sounds. No murmur heard.  Pulmonary:      Effort: Pulmonary effort is normal.      Breath sounds: Normal breath sounds. No wheezing.   Musculoskeletal:      Cervical back: Normal range of motion.      Right lower leg: No edema.      Left lower leg: No edema.      Comments: Gait normal.  strong, equal   Skin:     General: Skin is warm and dry.      Capillary Refill: Capillary refill takes less than 2 seconds.   Neurological:      General: No focal deficit present.      Mental Status: He is alert.   Psychiatric:         Attention and Perception: Attention and perception normal.         Mood and Affect: Mood and affect normal.         Speech: Speech normal.         Behavior: Behavior normal.       Assessment:       1. Scalp cyst        Plan:       Scalp cyst: schedule US for evaluation.   -     US Soft Tissue Head Neck Thyroid; Future; Expected date: 07/20/2023       Continue current medication  Take medications only as prescribed  Healthy diet, exercise  Adequate rest  Adequate hydration  Avoid allergens  Avoid excessive caffeine      Follow up after US completed.

## 2023-07-20 ENCOUNTER — OFFICE VISIT (OUTPATIENT)
Dept: FAMILY MEDICINE | Facility: CLINIC | Age: 48
End: 2023-07-20
Payer: COMMERCIAL

## 2023-07-20 VITALS
HEART RATE: 73 BPM | TEMPERATURE: 97 F | RESPIRATION RATE: 16 BRPM | OXYGEN SATURATION: 97 % | WEIGHT: 213.19 LBS | SYSTOLIC BLOOD PRESSURE: 128 MMHG | DIASTOLIC BLOOD PRESSURE: 86 MMHG | BODY MASS INDEX: 29.85 KG/M2 | HEIGHT: 71 IN

## 2023-07-20 DIAGNOSIS — L72.9 SCALP CYST: Primary | ICD-10-CM

## 2023-07-20 PROCEDURE — 1159F MED LIST DOCD IN RCRD: CPT | Mod: CPTII,S$GLB,, | Performed by: NURSE PRACTITIONER

## 2023-07-20 PROCEDURE — 1160F PR REVIEW ALL MEDS BY PRESCRIBER/CLIN PHARMACIST DOCUMENTED: ICD-10-PCS | Mod: CPTII,S$GLB,, | Performed by: NURSE PRACTITIONER

## 2023-07-20 PROCEDURE — 3074F PR MOST RECENT SYSTOLIC BLOOD PRESSURE < 130 MM HG: ICD-10-PCS | Mod: CPTII,S$GLB,, | Performed by: NURSE PRACTITIONER

## 2023-07-20 PROCEDURE — 1160F RVW MEDS BY RX/DR IN RCRD: CPT | Mod: CPTII,S$GLB,, | Performed by: NURSE PRACTITIONER

## 2023-07-20 PROCEDURE — 3079F DIAST BP 80-89 MM HG: CPT | Mod: CPTII,S$GLB,, | Performed by: NURSE PRACTITIONER

## 2023-07-20 PROCEDURE — 3079F PR MOST RECENT DIASTOLIC BLOOD PRESSURE 80-89 MM HG: ICD-10-PCS | Mod: CPTII,S$GLB,, | Performed by: NURSE PRACTITIONER

## 2023-07-20 PROCEDURE — 3008F PR BODY MASS INDEX (BMI) DOCUMENTED: ICD-10-PCS | Mod: CPTII,S$GLB,, | Performed by: NURSE PRACTITIONER

## 2023-07-20 PROCEDURE — 1159F PR MEDICATION LIST DOCUMENTED IN MEDICAL RECORD: ICD-10-PCS | Mod: CPTII,S$GLB,, | Performed by: NURSE PRACTITIONER

## 2023-07-20 PROCEDURE — 3008F BODY MASS INDEX DOCD: CPT | Mod: CPTII,S$GLB,, | Performed by: NURSE PRACTITIONER

## 2023-07-20 PROCEDURE — 99213 PR OFFICE/OUTPT VISIT, EST, LEVL III, 20-29 MIN: ICD-10-PCS | Mod: S$GLB,,, | Performed by: NURSE PRACTITIONER

## 2023-07-20 PROCEDURE — 3044F HG A1C LEVEL LT 7.0%: CPT | Mod: CPTII,S$GLB,, | Performed by: NURSE PRACTITIONER

## 2023-07-20 PROCEDURE — 99213 OFFICE O/P EST LOW 20 MIN: CPT | Mod: S$GLB,,, | Performed by: NURSE PRACTITIONER

## 2023-07-20 PROCEDURE — 3044F PR MOST RECENT HEMOGLOBIN A1C LEVEL <7.0%: ICD-10-PCS | Mod: CPTII,S$GLB,, | Performed by: NURSE PRACTITIONER

## 2023-07-20 PROCEDURE — 3074F SYST BP LT 130 MM HG: CPT | Mod: CPTII,S$GLB,, | Performed by: NURSE PRACTITIONER

## 2023-07-26 ENCOUNTER — PATIENT MESSAGE (OUTPATIENT)
Dept: FAMILY MEDICINE | Facility: CLINIC | Age: 48
End: 2023-07-26
Payer: COMMERCIAL

## 2023-07-26 ENCOUNTER — HOSPITAL ENCOUNTER (OUTPATIENT)
Dept: RADIOLOGY | Facility: HOSPITAL | Age: 48
Discharge: HOME | End: 2023-07-26
Attending: NURSE PRACTITIONER
Payer: COMMERCIAL

## 2023-07-26 DIAGNOSIS — L72.9 SCALP CYST: ICD-10-CM

## 2023-07-26 PROCEDURE — 76536 US SOFT TISSUE HEAD NECK THYROID: ICD-10-PCS | Mod: 26,,, | Performed by: RADIOLOGY

## 2023-07-26 PROCEDURE — 76536 US EXAM OF HEAD AND NECK: CPT | Mod: 26,,, | Performed by: RADIOLOGY

## 2023-07-26 PROCEDURE — 76536 US EXAM OF HEAD AND NECK: CPT | Mod: TC,PO

## 2023-07-31 ENCOUNTER — PATIENT MESSAGE (OUTPATIENT)
Dept: FAMILY MEDICINE | Facility: CLINIC | Age: 48
End: 2023-07-31
Payer: COMMERCIAL

## 2023-07-31 DIAGNOSIS — D17.9 LIPOMA, UNSPECIFIED SITE: Primary | ICD-10-CM

## 2023-08-01 ENCOUNTER — PATIENT MESSAGE (OUTPATIENT)
Dept: FAMILY MEDICINE | Facility: CLINIC | Age: 48
End: 2023-08-01
Payer: COMMERCIAL

## 2023-08-12 DIAGNOSIS — E03.4 HYPOTHYROIDISM DUE TO ACQUIRED ATROPHY OF THYROID: ICD-10-CM

## 2023-08-13 NOTE — TELEPHONE ENCOUNTER
Refill Routing Note   Medication(s) are not appropriate for processing by [facility] for the following reason(s):      Non-participating provider    [facility] action(s):  Route Care Due:  None identified            Appointments  past 12m or future 3m with PCP    Date Provider   Last Visit   7/20/2023 Talita Strickland NP   Next Visit   10/6/2023 Talita Strickland NP   ED visits in past 90 days: 0        Note composed:1:59 PM 08/13/2023

## 2023-08-14 RX ORDER — LEVOTHYROXINE SODIUM 75 UG/1
TABLET ORAL
Qty: 90 TABLET | Refills: 3 | Status: SHIPPED | OUTPATIENT
Start: 2023-08-14

## 2023-08-14 RX ORDER — OMEPRAZOLE 40 MG/1
CAPSULE, DELAYED RELEASE ORAL
Qty: 90 CAPSULE | Refills: 3 | Status: SHIPPED | OUTPATIENT
Start: 2023-08-14

## 2023-08-30 ENCOUNTER — OFFICE VISIT (OUTPATIENT)
Dept: SURGERY | Facility: CLINIC | Age: 48
End: 2023-08-30
Payer: COMMERCIAL

## 2023-08-30 VITALS
WEIGHT: 214.31 LBS | HEART RATE: 73 BPM | BODY MASS INDEX: 30 KG/M2 | DIASTOLIC BLOOD PRESSURE: 91 MMHG | HEIGHT: 71 IN | SYSTOLIC BLOOD PRESSURE: 139 MMHG | TEMPERATURE: 97 F

## 2023-08-30 DIAGNOSIS — D17.9 LIPOMA, UNSPECIFIED SITE: ICD-10-CM

## 2023-08-30 PROCEDURE — 99204 PR OFFICE/OUTPT VISIT, NEW, LEVL IV, 45-59 MIN: ICD-10-PCS | Mod: S$GLB,,, | Performed by: SURGERY

## 2023-08-30 PROCEDURE — 3075F PR MOST RECENT SYSTOLIC BLOOD PRESS GE 130-139MM HG: ICD-10-PCS | Mod: CPTII,S$GLB,, | Performed by: SURGERY

## 2023-08-30 PROCEDURE — 99999 PR PBB SHADOW E&M-EST. PATIENT-LVL V: CPT | Mod: PBBFAC,,, | Performed by: SURGERY

## 2023-08-30 PROCEDURE — 3044F HG A1C LEVEL LT 7.0%: CPT | Mod: CPTII,S$GLB,, | Performed by: SURGERY

## 2023-08-30 PROCEDURE — 3080F DIAST BP >= 90 MM HG: CPT | Mod: CPTII,S$GLB,, | Performed by: SURGERY

## 2023-08-30 PROCEDURE — 3044F PR MOST RECENT HEMOGLOBIN A1C LEVEL <7.0%: ICD-10-PCS | Mod: CPTII,S$GLB,, | Performed by: SURGERY

## 2023-08-30 PROCEDURE — 3008F BODY MASS INDEX DOCD: CPT | Mod: CPTII,S$GLB,, | Performed by: SURGERY

## 2023-08-30 PROCEDURE — 3075F SYST BP GE 130 - 139MM HG: CPT | Mod: CPTII,S$GLB,, | Performed by: SURGERY

## 2023-08-30 PROCEDURE — 99204 OFFICE O/P NEW MOD 45 MIN: CPT | Mod: S$GLB,,, | Performed by: SURGERY

## 2023-08-30 PROCEDURE — 3008F PR BODY MASS INDEX (BMI) DOCUMENTED: ICD-10-PCS | Mod: CPTII,S$GLB,, | Performed by: SURGERY

## 2023-08-30 PROCEDURE — 1159F PR MEDICATION LIST DOCUMENTED IN MEDICAL RECORD: ICD-10-PCS | Mod: CPTII,S$GLB,, | Performed by: SURGERY

## 2023-08-30 PROCEDURE — 99999 PR PBB SHADOW E&M-EST. PATIENT-LVL V: ICD-10-PCS | Mod: PBBFAC,,, | Performed by: SURGERY

## 2023-08-30 PROCEDURE — 3080F PR MOST RECENT DIASTOLIC BLOOD PRESSURE >= 90 MM HG: ICD-10-PCS | Mod: CPTII,S$GLB,, | Performed by: SURGERY

## 2023-08-30 PROCEDURE — 1159F MED LIST DOCD IN RCRD: CPT | Mod: CPTII,S$GLB,, | Performed by: SURGERY

## 2023-08-30 NOTE — PROGRESS NOTES
Subjective     Patient ID: Khadar Lorenzo is a 47 y.o. male.    Chief Complaint: Consult    HPI  this is a pleasant 47-year-old gentleman who was referred to me for evaluation of a soft tissue mass on the posterior scalp.  Patient notes that is Haylie noticed a few months ago.  Stated has been persistent since that time.  Feels like it does get larger at times.  Denies any drainage.  Does note some occasional soreness in the area.  Can occasionally since some muscle tension in his curious that this is causing it.  He has no other significant medical or surgical history.  Review of Systems   Constitutional:  Negative for activity change and appetite change.   Cardiovascular:  Negative for chest pain.   Gastrointestinal:  Negative for abdominal distention, abdominal pain, nausea and vomiting.   Musculoskeletal:  Negative for arthralgias and joint swelling.   Hematological:  Negative for adenopathy.          Objective     Physical Exam  Vitals reviewed.   Cardiovascular:      Rate and Rhythm: Normal rate.      Pulses: Normal pulses.   Pulmonary:      Effort: Pulmonary effort is normal.   Abdominal:      General: Abdomen is flat. Bowel sounds are normal. There is no distension.      Tenderness: There is no abdominal tenderness.      Hernia: No hernia is present.   Skin:     Comments: Soft tissue mass consistent with a lipoma at the posterior scalp   Neurological:      Mental Status: He is alert.   Psychiatric:         Mood and Affect: Mood normal.     US:    Impression:     28 mm probable subcutaneous lipoma in the area of palpable concern over the left posterior occipital region       Assessment and Plan     1. Lipoma, unspecified site  -     Ambulatory referral/consult to General Surgery        Discussion with patient.  He would findings consistent with a soft tissue lipoma.  This was confirmed on ultrasound which did demonstrate a 2.8 cm soft tissue lipoma.  In discussion with the patient I have offered  observation versus proceeding with surgical excision.  Risks of procedure were discussed in detail.  He notes he would like to proceed with surgery.  Informed consent has been obtained.  Surgery scheduled for mid October         No follow-ups on file.

## 2023-08-30 NOTE — PATIENT INSTRUCTIONS
Surgery is scheduled for 10/18/23 arrival time will be given by the the preop nurse.  You may receive two calls from the Preop Nurses one a few days before surgery the second the day before surgery with the arrival time.  The preop nurse will call you from [phone]  Nothing to eat or drink after midnight.  Someone to drive you home if you are same day surgery.    THE PREOP NURSE WILL CALL, SOMETIMES AS LATE AS 4 or 5 PM IN THE AFTERNOON THE DAY BEFORE SURGERY.    Shower the night before and the morning of your procedure with Chlorhexidine Surgical Scrub also known as Hibiclens , can be purchased at most Pharmacy's no prescription needed.    Special Instruction:     Your procedure/surgery is scheduled at the [facility] at [address] [facility] [address] in [place] on the first floor. Entrance 2.     Contact Sadi Souza LPN for questions are concerns. [phone]

## 2023-09-01 RX ORDER — SODIUM CHLORIDE 9 MG/ML
INJECTION, SOLUTION INTRAVENOUS CONTINUOUS
Status: CANCELLED | OUTPATIENT
Start: 2023-09-01

## 2023-10-12 ENCOUNTER — OFFICE VISIT (OUTPATIENT)
Dept: FAMILY MEDICINE | Facility: CLINIC | Age: 48
End: 2023-10-12
Payer: COMMERCIAL

## 2023-10-12 DIAGNOSIS — E03.8 SECONDARY HYPOTHYROIDISM: ICD-10-CM

## 2023-10-12 DIAGNOSIS — Z12.5 PROSTATE CANCER SCREENING: ICD-10-CM

## 2023-10-12 DIAGNOSIS — D17.0 LIPOMA OF SCALP: ICD-10-CM

## 2023-10-12 DIAGNOSIS — E29.1 MALE HYPOGONADISM: ICD-10-CM

## 2023-10-12 DIAGNOSIS — E78.1 HYPERTRIGLYCERIDEMIA: Primary | ICD-10-CM

## 2023-10-12 DIAGNOSIS — K21.9 GASTROESOPHAGEAL REFLUX DISEASE WITHOUT ESOPHAGITIS: ICD-10-CM

## 2023-10-12 PROBLEM — E03.9 HYPOTHYROIDISM: Status: RESOLVED | Noted: 2023-05-10 | Resolved: 2023-10-12

## 2023-10-12 LAB
ALBUMIN SERPL BCP-MCNC: 4.2 G/DL (ref 3.5–5.2)
ALP SERPL-CCNC: 52 U/L (ref 55–135)
ALT SERPL W/O P-5'-P-CCNC: 47 U/L (ref 10–44)
ANION GAP SERPL CALC-SCNC: 7 MMOL/L (ref 8–16)
AST SERPL-CCNC: 29 U/L (ref 10–40)
BILIRUB SERPL-MCNC: 0.7 MG/DL (ref 0.1–1)
BUN SERPL-MCNC: 11 MG/DL (ref 6–20)
CALCIUM SERPL-MCNC: 8.9 MG/DL (ref 8.7–10.5)
CHLORIDE SERPL-SCNC: 106 MMOL/L (ref 95–110)
CHOLEST SERPL-MCNC: 161 MG/DL (ref 120–199)
CHOLEST/HDLC SERPL: 4.5 {RATIO} (ref 2–5)
CO2 SERPL-SCNC: 25 MMOL/L (ref 23–29)
COMPLEXED PSA SERPL-MCNC: 1.6 NG/ML (ref 0–4)
CREAT SERPL-MCNC: 0.9 MG/DL (ref 0.5–1.4)
EST. GFR  (NO RACE VARIABLE): >60 ML/MIN/1.73 M^2
GLUCOSE SERPL-MCNC: 94 MG/DL (ref 70–110)
HDLC SERPL-MCNC: 36 MG/DL (ref 40–75)
HDLC SERPL: 22.4 % (ref 20–50)
LDLC SERPL CALC-MCNC: 98.6 MG/DL (ref 63–159)
NONHDLC SERPL-MCNC: 125 MG/DL
POTASSIUM SERPL-SCNC: 4 MMOL/L (ref 3.5–5.1)
PROT SERPL-MCNC: 6.8 G/DL (ref 6–8.4)
SODIUM SERPL-SCNC: 138 MMOL/L (ref 136–145)
TRIGL SERPL-MCNC: 132 MG/DL (ref 30–150)

## 2023-10-12 PROCEDURE — 3008F BODY MASS INDEX DOCD: CPT | Mod: CPTII,S$GLB,, | Performed by: NURSE PRACTITIONER

## 2023-10-12 PROCEDURE — 90471 IMMUNIZATION ADMIN: CPT | Mod: S$GLB,,, | Performed by: NURSE PRACTITIONER

## 2023-10-12 PROCEDURE — 90471 FLU VACCINE (QUAD) GREATER THAN OR EQUAL TO 3YO PRESERVATIVE FREE IM: ICD-10-PCS | Mod: S$GLB,,, | Performed by: NURSE PRACTITIONER

## 2023-10-12 PROCEDURE — 80053 COMPREHEN METABOLIC PANEL: CPT | Performed by: NURSE PRACTITIONER

## 2023-10-12 PROCEDURE — 90686 IIV4 VACC NO PRSV 0.5 ML IM: CPT | Mod: S$GLB,,, | Performed by: NURSE PRACTITIONER

## 2023-10-12 PROCEDURE — 3044F HG A1C LEVEL LT 7.0%: CPT | Mod: CPTII,S$GLB,, | Performed by: NURSE PRACTITIONER

## 2023-10-12 PROCEDURE — 3074F SYST BP LT 130 MM HG: CPT | Mod: CPTII,S$GLB,, | Performed by: NURSE PRACTITIONER

## 2023-10-12 PROCEDURE — 3078F DIAST BP <80 MM HG: CPT | Mod: CPTII,S$GLB,, | Performed by: NURSE PRACTITIONER

## 2023-10-12 PROCEDURE — 99214 PR OFFICE/OUTPT VISIT, EST, LEVL IV, 30-39 MIN: ICD-10-PCS | Mod: 25,S$GLB,, | Performed by: NURSE PRACTITIONER

## 2023-10-12 PROCEDURE — 99214 OFFICE O/P EST MOD 30 MIN: CPT | Mod: 25,S$GLB,, | Performed by: NURSE PRACTITIONER

## 2023-10-12 PROCEDURE — 3074F PR MOST RECENT SYSTOLIC BLOOD PRESSURE < 130 MM HG: ICD-10-PCS | Mod: CPTII,S$GLB,, | Performed by: NURSE PRACTITIONER

## 2023-10-12 PROCEDURE — 3008F PR BODY MASS INDEX (BMI) DOCUMENTED: ICD-10-PCS | Mod: CPTII,S$GLB,, | Performed by: NURSE PRACTITIONER

## 2023-10-12 PROCEDURE — 3044F PR MOST RECENT HEMOGLOBIN A1C LEVEL <7.0%: ICD-10-PCS | Mod: CPTII,S$GLB,, | Performed by: NURSE PRACTITIONER

## 2023-10-12 PROCEDURE — 84153 ASSAY OF PSA TOTAL: CPT | Performed by: NURSE PRACTITIONER

## 2023-10-12 PROCEDURE — 3078F PR MOST RECENT DIASTOLIC BLOOD PRESSURE < 80 MM HG: ICD-10-PCS | Mod: CPTII,S$GLB,, | Performed by: NURSE PRACTITIONER

## 2023-10-12 PROCEDURE — 90686 FLU VACCINE (QUAD) GREATER THAN OR EQUAL TO 3YO PRESERVATIVE FREE IM: ICD-10-PCS | Mod: S$GLB,,, | Performed by: NURSE PRACTITIONER

## 2023-10-12 PROCEDURE — 80061 LIPID PANEL: CPT | Performed by: NURSE PRACTITIONER

## 2023-10-12 NOTE — PROGRESS NOTES
Subjective:       Patient ID: Khadar Lorenzo is a 47 y.o. male.    Chief Complaint: Follow-up    HPI here for follow up.  States he is doing well. [place]  He is scheduled next week to have the lipoma to back of his scalp removed.     He is due for labs. He has not done the home fit kit, promises to get that done. Will give flu vaccine today.     Several chronic issues to review.  See ROS/assessment and plan    The following portion of the patients history was reviewed and updated as appropriate: allergies, current medications, past medical and surgical history. Past social history and problem list reviewed. Family PMH and Past social history reviewed. Tobacco, Illicit drug use reviewed.      Review of patient's allergies indicates:  No Known Allergies      Current Outpatient Medications:     cyclobenzaprine (FLEXERIL) 10 MG tablet, Take 10 mg by mouth 3 (three) times daily as needed for Muscle spasms., Disp: , Rfl:     fenofibrate 160 MG Tab, TAKE 1 TABLET DAILY, Disp: 90 tablet, Rfl: 3    levothyroxine (SYNTHROID) 75 MCG tablet, TAKE 1 TABLET BEFORE BREAKFAST, Disp: 90 tablet, Rfl: 3    meloxicam (MOBIC) 15 MG tablet, Take 1 tablet (15 mg total) by mouth daily as needed for Pain. (Patient not taking: Reported on 8/30/2023), Disp: 30 tablet, Rfl: 2    OLUX-E 0.05 % topical foam, Apply 0.05 % topically continuous prn., Disp: , Rfl:     omeprazole (PRILOSEC) 40 MG capsule, TAKE 1 CAPSULE DAILY, Disp: 90 capsule, Rfl: 3    ondansetron (ZOFRAN-ODT) 8 MG TbDL, Take 1 tablet (8 mg total) by mouth every 8 (eight) hours as needed (Nausea). (Patient not taking: Reported on 8/30/2023), Disp: 3 tablet, Rfl: 0    tadalafiL (CIALIS) 20 MG Tab, TAKE 1/2 TO 1 TABLET BY MOUTH EVERY 3 DAYS AS NEEDED (Patient not taking: Reported on 8/30/2023), Disp: 10 tablet, Rfl: 11    tadalafiL (CIALIS) 5 MG tablet, Take 1 tablet (5 mg total) by mouth daily as needed for Erectile Dysfunction., Disp: 30 tablet,  Rfl: 11    testosterone cypionate (DEPOTESTOTERONE CYPIONATE) 200 mg/mL injection, Inject 1 mL (200 mg total) into the muscle every 10 days. (Patient not taking: Reported on 8/30/2023), Disp: 10 mL, Rfl: 1    triamcinolone acetonide 0.1% (KENALOG) 0.1 % cream, aaa up to BID PRN rash (Patient not taking: Reported on 8/30/2023), Disp: 240 g, Rfl: 0    Past Medical History:   Diagnosis Date    Anxiety     Depression     GERD (gastroesophageal reflux disease)     Hyperlipidemia     Hypotestosteronemia     Hypothyroidism     Nephrolithiasis     2 episodes    Obesity     Snoring     Syncope and collapse        Past Surgical History:   Procedure Laterality Date    CYST REMOVAL      left wrist    FRACTURE SURGERY      screw in right thumb    testopel      by Dr. Martini in the past for his hypogonadism.    TONSILLECTOMY, ADENOIDECTOMY, BILATERAL MYRINGOTOMY AND TUBES         Social History     Socioeconomic History    Marital status:    Occupational History    Occupation: [employer]   Tobacco Use    Smoking status: Former    Smokeless tobacco: Current     Types: Chew    Tobacco comments:     chew everyday, 0.25 can    Substance and Sexual Activity    Alcohol use: Yes     Alcohol/week: 1.0 standard drink of alcohol     Types: 1 Cans of beer per week     Comment: socially    Drug use: No    Sexual activity: Yes     Partners: Female     Review of Systems   Constitutional:  Negative for fatigue and fever.   Eyes:  Negative for visual disturbance.   Respiratory:  Negative for cough, chest tightness, shortness of breath and wheezing.    Cardiovascular:  Negative for chest pain, palpitations and leg swelling.   Gastrointestinal:  Negative for abdominal pain, diarrhea, nausea and vomiting.   Genitourinary: Negative.    Musculoskeletal:  Negative for arthralgias.   Skin:         Lipoma to posterior scalp   Neurological:  Negative for headaches.   Psychiatric/Behavioral:  Negative for dysphoric mood and sleep  "disturbance. The patient is not nervous/anxious.        Objective:      /79 (BP Location: Left arm, Patient Position: Sitting, BP Method: Medium (Manual))   Pulse 70   Temp 97.5 °F (36.4 °C)   Ht 5' 11" (1.803 m)   Wt 95.4 kg (210 lb 5.1 oz)   SpO2 96%   BMI 29.33 kg/m²      Physical Exam  Constitutional:       Appearance: Normal appearance. He is overweight.   HENT:      Head: Normocephalic.   Neck:      Thyroid: No thyromegaly.      Vascular: No carotid bruit.   Cardiovascular:      Rate and Rhythm: Normal rate and regular rhythm.      Pulses: Normal pulses.      Heart sounds: Normal heart sounds. No murmur heard.  Pulmonary:      Effort: Pulmonary effort is normal.      Breath sounds: Normal breath sounds. No wheezing.   Abdominal:      General: Bowel sounds are normal.      Tenderness: There is no abdominal tenderness.   Musculoskeletal:      Cervical back: Normal range of motion.      Right lower leg: No edema.      Left lower leg: No edema.      Comments: Gait normal.  strong, equal   Skin:     General: Skin is warm and dry.      Capillary Refill: Capillary refill takes less than 2 seconds.          Neurological:      General: No focal deficit present.      Mental Status: He is alert.   Psychiatric:         Attention and Perception: Attention and perception normal.         Mood and Affect: Mood and affect normal.         Speech: Speech normal.         Behavior: Behavior normal.         Assessment:       1. Hypertriglyceridemia    2. Secondary hypothyroidism    3. Male hypogonadism    4. Gastroesophageal reflux disease without esophagitis    5. Prostate cancer screening    6. Lipoma of scalp        Plan:       Hypertriglyceridemia: taking triglycerides as scheduled. Due to repeat labs  -     Lipid Panel  -     Comprehensive Metabolic Panel    Secondary hypothyroidism: Will adjust dose of thyroid medication if needed based on lab results     Male hypogonadism: followed by Urology. On " testosterone replacement.     Gastroesophageal reflux disease without esophagitis: stable on prilosec    Prostate cancer screening  -     PSA, Screening    Lipoma of scalp: scheduled for removal  next week.     Other orders  -     Influenza - Quadrivalent (PF)    I spent a total of 30 minutes on the day of the visit.     This includes face to face time with the patient, as well as non-face to face time preparing for and completing the visit (review of prior diagnostic testing and clinical notes, obtaining or reviewing history, documenting clinical information in the EMR, independently interpreting and communicating results to the patient/family and coordinating ongoing care).       Continue current medication  Take medications only as prescribed  Healthy diet, exercise  Adequate rest  Adequate hydration  Avoid allergens  Avoid excessive caffeine      Follow up 6 months.

## 2023-10-16 ENCOUNTER — PATIENT MESSAGE (OUTPATIENT)
Dept: FAMILY MEDICINE | Facility: CLINIC | Age: 48
End: 2023-10-16
Payer: COMMERCIAL

## 2023-10-16 ENCOUNTER — PATIENT MESSAGE (OUTPATIENT)
Dept: SURGERY | Facility: CLINIC | Age: 48
End: 2023-10-16
Payer: COMMERCIAL

## 2023-10-17 ENCOUNTER — ANESTHESIA EVENT (OUTPATIENT)
Dept: SURGERY | Facility: HOSPITAL | Age: 48
End: 2023-10-17
Payer: COMMERCIAL

## 2023-10-17 VITALS
DIASTOLIC BLOOD PRESSURE: 79 MMHG | TEMPERATURE: 98 F | WEIGHT: 210.31 LBS | HEART RATE: 70 BPM | SYSTOLIC BLOOD PRESSURE: 118 MMHG | HEIGHT: 71 IN | OXYGEN SATURATION: 96 % | BODY MASS INDEX: 29.44 KG/M2

## 2023-10-18 ENCOUNTER — HOSPITAL ENCOUNTER (OUTPATIENT)
Facility: HOSPITAL | Age: 48
Discharge: HOME | End: 2023-10-18
Attending: SURGERY | Admitting: SURGERY
Payer: COMMERCIAL

## 2023-10-18 ENCOUNTER — ANESTHESIA (OUTPATIENT)
Dept: SURGERY | Facility: HOSPITAL | Age: 48
End: 2023-10-18
Payer: COMMERCIAL

## 2023-10-18 DIAGNOSIS — D17.9 LIPOMA, UNSPECIFIED SITE: ICD-10-CM

## 2023-10-18 PROCEDURE — 63600175 PHARM REV CODE 636 W HCPCS: Mod: PO | Performed by: ANESTHESIOLOGY

## 2023-10-18 PROCEDURE — 36000706: Mod: PO | Performed by: SURGERY

## 2023-10-18 PROCEDURE — 88304 TISSUE EXAM BY PATHOLOGIST: CPT | Mod: PO | Performed by: STUDENT IN AN ORGANIZED HEALTH CARE EDUCATION/TRAINING PROGRAM

## 2023-10-18 PROCEDURE — 88304 PR  SURG PATH,LEVEL III: ICD-10-PCS | Mod: 26,,, | Performed by: STUDENT IN AN ORGANIZED HEALTH CARE EDUCATION/TRAINING PROGRAM

## 2023-10-18 PROCEDURE — 25000003 PHARM REV CODE 250: Mod: PO | Performed by: SURGERY

## 2023-10-18 PROCEDURE — 88304 TISSUE EXAM BY PATHOLOGIST: CPT | Mod: 26,,, | Performed by: STUDENT IN AN ORGANIZED HEALTH CARE EDUCATION/TRAINING PROGRAM

## 2023-10-18 PROCEDURE — 71000016 HC POSTOP RECOV ADDL HR: Mod: PO | Performed by: SURGERY

## 2023-10-18 PROCEDURE — 37000008 HC ANESTHESIA 1ST 15 MINUTES: Mod: PO | Performed by: SURGERY

## 2023-10-18 PROCEDURE — 71000015 HC POSTOP RECOV 1ST HR: Mod: PO | Performed by: SURGERY

## 2023-10-18 PROCEDURE — 88307 TISSUE EXAM BY PATHOLOGIST: CPT | Mod: PO | Performed by: STUDENT IN AN ORGANIZED HEALTH CARE EDUCATION/TRAINING PROGRAM

## 2023-10-18 PROCEDURE — 25000003 PHARM REV CODE 250: Mod: PO | Performed by: ANESTHESIOLOGY

## 2023-10-18 PROCEDURE — 21013 EXC FACE TUM DEEP < 2 CM: CPT | Mod: ,,, | Performed by: SURGERY

## 2023-10-18 PROCEDURE — D9220A PRA ANESTHESIA: Mod: CRNA,,, | Performed by: NURSE ANESTHETIST, CERTIFIED REGISTERED

## 2023-10-18 PROCEDURE — 21013 PR EXC TUMOR SOFT TISS FACE&SCALP SUBFASCIAL < 2CM: ICD-10-PCS | Mod: ,,, | Performed by: SURGERY

## 2023-10-18 PROCEDURE — D9220A PRA ANESTHESIA: Mod: ANES,,, | Performed by: ANESTHESIOLOGY

## 2023-10-18 PROCEDURE — D9220A PRA ANESTHESIA: ICD-10-PCS | Mod: CRNA,,, | Performed by: NURSE ANESTHETIST, CERTIFIED REGISTERED

## 2023-10-18 PROCEDURE — 25000003 PHARM REV CODE 250: Mod: PO | Performed by: NURSE ANESTHETIST, CERTIFIED REGISTERED

## 2023-10-18 PROCEDURE — 36000707: Mod: PO | Performed by: SURGERY

## 2023-10-18 PROCEDURE — 63600175 PHARM REV CODE 636 W HCPCS: Mod: PO | Performed by: NURSE ANESTHETIST, CERTIFIED REGISTERED

## 2023-10-18 PROCEDURE — D9220A PRA ANESTHESIA: ICD-10-PCS | Mod: ANES,,, | Performed by: ANESTHESIOLOGY

## 2023-10-18 PROCEDURE — 37000009 HC ANESTHESIA EA ADD 15 MINS: Mod: PO | Performed by: SURGERY

## 2023-10-18 PROCEDURE — 27201423 OPTIME MED/SURG SUP & DEVICES STERILE SUPPLY: Mod: PO | Performed by: SURGERY

## 2023-10-18 RX ORDER — SODIUM CHLORIDE 9 MG/ML
INJECTION, SOLUTION INTRAVENOUS CONTINUOUS
Status: DISCONTINUED | OUTPATIENT
Start: 2023-10-18 | End: 2023-10-18 | Stop reason: HOSPADM

## 2023-10-18 RX ORDER — ONDANSETRON 2 MG/ML
INJECTION INTRAMUSCULAR; INTRAVENOUS
Status: DISCONTINUED | OUTPATIENT
Start: 2023-10-18 | End: 2023-10-18

## 2023-10-18 RX ORDER — LIDOCAINE HYDROCHLORIDE 10 MG/ML
1 INJECTION, SOLUTION EPIDURAL; INFILTRATION; INTRACAUDAL; PERINEURAL ONCE
Status: DISCONTINUED | OUTPATIENT
Start: 2023-10-18 | End: 2023-10-18 | Stop reason: HOSPADM

## 2023-10-18 RX ORDER — LIDOCAINE HYDROCHLORIDE 20 MG/ML
INJECTION INTRAVENOUS
Status: DISCONTINUED | OUTPATIENT
Start: 2023-10-18 | End: 2023-10-18

## 2023-10-18 RX ORDER — DIPHENHYDRAMINE HYDROCHLORIDE 50 MG/ML
25 INJECTION INTRAMUSCULAR; INTRAVENOUS EVERY 6 HOURS PRN
Status: DISCONTINUED | OUTPATIENT
Start: 2023-10-18 | End: 2023-10-18 | Stop reason: HOSPADM

## 2023-10-18 RX ORDER — ONDANSETRON 2 MG/ML
4 INJECTION INTRAMUSCULAR; INTRAVENOUS EVERY 12 HOURS PRN
Status: DISCONTINUED | OUTPATIENT
Start: 2023-10-18 | End: 2023-10-18 | Stop reason: HOSPADM

## 2023-10-18 RX ORDER — HYDROCODONE BITARTRATE AND ACETAMINOPHEN 5; 325 MG/1; MG/1
1 TABLET ORAL EVERY 6 HOURS PRN
Qty: 20 TABLET | Refills: 0 | OUTPATIENT
Start: 2023-10-18 | End: 2023-10-30

## 2023-10-18 RX ORDER — CEFAZOLIN SODIUM 1 G/3ML
INJECTION, POWDER, FOR SOLUTION INTRAMUSCULAR; INTRAVENOUS
Status: DISCONTINUED | OUTPATIENT
Start: 2023-10-18 | End: 2023-10-18

## 2023-10-18 RX ORDER — SODIUM CHLORIDE, SODIUM LACTATE, POTASSIUM CHLORIDE, CALCIUM CHLORIDE 600; 310; 30; 20 MG/100ML; MG/100ML; MG/100ML; MG/100ML
INJECTION, SOLUTION INTRAVENOUS CONTINUOUS
Status: DISCONTINUED | OUTPATIENT
Start: 2023-10-18 | End: 2023-10-18 | Stop reason: HOSPADM

## 2023-10-18 RX ORDER — BUPIVACAINE HYDROCHLORIDE AND EPINEPHRINE 5; 5 MG/ML; UG/ML
INJECTION, SOLUTION EPIDURAL; INTRACAUDAL; PERINEURAL
Status: DISCONTINUED | OUTPATIENT
Start: 2023-10-18 | End: 2023-10-18 | Stop reason: HOSPADM

## 2023-10-18 RX ORDER — PROPOFOL 10 MG/ML
VIAL (ML) INTRAVENOUS
Status: DISCONTINUED | OUTPATIENT
Start: 2023-10-18 | End: 2023-10-18

## 2023-10-18 RX ORDER — MEPERIDINE HYDROCHLORIDE 50 MG/ML
12.5 INJECTION INTRAMUSCULAR; INTRAVENOUS; SUBCUTANEOUS ONCE
Status: DISCONTINUED | OUTPATIENT
Start: 2023-10-18 | End: 2023-10-18 | Stop reason: HOSPADM

## 2023-10-18 RX ORDER — MIDAZOLAM HYDROCHLORIDE 1 MG/ML
INJECTION, SOLUTION INTRAMUSCULAR; INTRAVENOUS
Status: DISCONTINUED | OUTPATIENT
Start: 2023-10-18 | End: 2023-10-18

## 2023-10-18 RX ORDER — FENTANYL CITRATE 50 UG/ML
INJECTION, SOLUTION INTRAMUSCULAR; INTRAVENOUS
Status: DISCONTINUED | OUTPATIENT
Start: 2023-10-18 | End: 2023-10-18

## 2023-10-18 RX ORDER — KETAMINE HCL IN 0.9 % NACL 50 MG/5 ML
SYRINGE (ML) INTRAVENOUS
Status: DISCONTINUED | OUTPATIENT
Start: 2023-10-18 | End: 2023-10-18

## 2023-10-18 RX ORDER — SODIUM CHLORIDE 0.9 % (FLUSH) 0.9 %
10 SYRINGE (ML) INJECTION ONCE
Status: DISCONTINUED | OUTPATIENT
Start: 2023-10-18 | End: 2023-10-18 | Stop reason: HOSPADM

## 2023-10-18 RX ORDER — HYDROMORPHONE HYDROCHLORIDE 2 MG/ML
0.2 INJECTION, SOLUTION INTRAMUSCULAR; INTRAVENOUS; SUBCUTANEOUS EVERY 5 MIN PRN
Status: DISCONTINUED | OUTPATIENT
Start: 2023-10-18 | End: 2023-10-18 | Stop reason: HOSPADM

## 2023-10-18 RX ORDER — OXYCODONE HYDROCHLORIDE 5 MG/1
5 TABLET ORAL
Status: DISCONTINUED | OUTPATIENT
Start: 2023-10-18 | End: 2023-10-18 | Stop reason: HOSPADM

## 2023-10-18 RX ORDER — HYDROCODONE BITARTRATE AND ACETAMINOPHEN 5; 325 MG/1; MG/1
1 TABLET ORAL EVERY 4 HOURS PRN
Status: DISCONTINUED | OUTPATIENT
Start: 2023-10-18 | End: 2023-10-18 | Stop reason: HOSPADM

## 2023-10-18 RX ORDER — PROPOFOL 10 MG/ML
VIAL (ML) INTRAVENOUS CONTINUOUS PRN
Status: DISCONTINUED | OUTPATIENT
Start: 2023-10-18 | End: 2023-10-18

## 2023-10-18 RX ADMIN — PROPOFOL 50 MG: 10 INJECTION, EMULSION INTRAVENOUS at 11:10

## 2023-10-18 RX ADMIN — ONDANSETRON 4 MG: 2 INJECTION, SOLUTION INTRAMUSCULAR; INTRAVENOUS at 11:10

## 2023-10-18 RX ADMIN — OXYCODONE HYDROCHLORIDE 5 MG: 5 TABLET ORAL at 12:10

## 2023-10-18 RX ADMIN — Medication 30 MG: at 11:10

## 2023-10-18 RX ADMIN — FENTANYL CITRATE 50 MCG: 50 INJECTION, SOLUTION INTRAMUSCULAR; INTRAVENOUS at 11:10

## 2023-10-18 RX ADMIN — MIDAZOLAM HYDROCHLORIDE 2 MG: 1 INJECTION, SOLUTION INTRAMUSCULAR; INTRAVENOUS at 10:10

## 2023-10-18 RX ADMIN — SODIUM CHLORIDE, POTASSIUM CHLORIDE, SODIUM LACTATE AND CALCIUM CHLORIDE: 600; 310; 30; 20 INJECTION, SOLUTION INTRAVENOUS at 10:10

## 2023-10-18 RX ADMIN — PROPOFOL 100 MCG/KG/MIN: 10 INJECTION, EMULSION INTRAVENOUS at 11:10

## 2023-10-18 RX ADMIN — FENTANYL CITRATE 25 MCG: 50 INJECTION, SOLUTION INTRAMUSCULAR; INTRAVENOUS at 11:10

## 2023-10-18 RX ADMIN — CEFAZOLIN 2 G: 330 INJECTION, POWDER, FOR SOLUTION INTRAMUSCULAR; INTRAVENOUS at 11:10

## 2023-10-18 RX ADMIN — LIDOCAINE HYDROCHLORIDE 100 MG: 20 INJECTION INTRAVENOUS at 11:10

## 2023-10-18 NOTE — DISCHARGE INSTRUCTIONS
WOUND CARE    After Surgery    DO:  May shower in 48 hours.  Minimal activity for 48 hours.  Your incision is closed with skin glue. Do not attempt to peel/remove this.   Advance diet as tolerated.  Resume home medications as prescribed.    DO NOT:  Do not soak in a tub or pool until directed by your physician.  Do not drive for 24 hours or while taking narcotic pain medication.  Do not take additional tylenol/acetaminophen while taking narcotic pain medication that contains tylenol/acetaminophen.     CALL PHYSICIAN FOR:  Redness, swelling or bleeding.  Fever greater than 101.  Drainage from the incision site that appears infected.  Persistent pain not relieved by pain medication.    RETURN APPOINTMENT: Call to schedule appointment in 10-14 days.    FOR EMERGENCIES: Contact your doctor at [phone].

## 2023-10-18 NOTE — ANESTHESIA PREPROCEDURE EVALUATION
10/18/2023  Khadar Lorenzo is a 47 y.o., male.      Pre-op Assessment    I have reviewed the Patient Summary Reports.     I have reviewed the Nursing Notes. I have reviewed the NPO Status.   I have reviewed the Medications.     Review of Systems  Anesthesia Hx:  Denies Family Hx of Anesthesia complications.   Denies Personal Hx of Anesthesia complications.   Cardiovascular:   hyperlipidemia    Renal/:   renal calculi    Hepatic/GI:   GERD    Endocrine:   Hypothyroidism  Obesity / BMI > 30  Psych:   Psychiatric History depression          Physical Exam  General: Cooperative, Alert and Oriented    Airway:  Mallampati: II / I  Mouth Opening: Normal  TM Distance: Normal  Tongue: Normal  Neck ROM: Normal ROM  Neck: Girth Increased    Dental:  Intact        Anesthesia Plan  Type of Anesthesia, risks & benefits discussed:    Anesthesia Type: MAC  Intra-op Monitoring Plan: Standard ASA Monitors  Post Op Pain Control Plan: multimodal analgesia  Informed Consent: Informed consent signed with the Patient and all parties understand the risks and agree with anesthesia plan.  All questions answered.   ASA Score: 2    Ready For Surgery From Anesthesia Perspective.     .

## 2023-10-18 NOTE — BRIEF OP NOTE
[facility] - Surgery  Brief Operative Note    Surgery Date: 10/18/2023     Surgeon(s) and Role:     * Zev Calderón MD - Primary    Assisting Surgeon: None    Pre-op Diagnosis:  Lipoma, unspecified site [D17.9]    Post-op Diagnosis:  Post-Op Diagnosis Codes:     * Lipoma, unspecified site [D17.9]    Procedure(s) (LRB):  EXCISION, SUPERFICIAL MASS, HEAD (N/A)    Anesthesia: General/MAC    Operative Findings: 3x2 cm soft tissue mass consistent with lipoma in area of occipital bone    Estimated Blood Loss: 25 mL         Specimens:   Specimen (24h ago, onward)       Start     Ordered    10/18/23 1117  Specimen to Pathology, Surgery General Surgery  Once        Comments: Pre-op Diagnosis: Lipoma, unspecified site [D17.9]Procedure(s):EXCISION, SUPERFICIAL MASS, HEAD Number of specimens: 1Name of specimens: 1. Soft tissue mass posterior scalp     References:    Click here for ordering Quick Tip   Question Answer Comment   Procedure Type: General Surgery    Which provider would you like to cc? ZEV CALDERÓN    Release to patient Immediate        10/18/23 1131                      Discharge Note    OUTCOME: Patient tolerated treatment/procedure well without complication and is now ready for discharge.    DISPOSITION: Home or Self Care    FINAL DIAGNOSIS:  Lipoma    FOLLOWUP: In clinic    DISCHARGE INSTRUCTIONS:    Discharge Procedure Orders   Diet general     Call MD for:  extreme fatigue     Call MD for:  persistent dizziness or light-headedness     Call MD for:  redness, tenderness, or signs of infection (pain, swelling, redness, odor or green/yellow discharge around incision site)     Call MD for:  hives     Call MD for:  difficulty breathing, headache or visual disturbances     Call MD for:  severe uncontrolled pain     Call MD for:  persistent nausea and vomiting     Call MD for:  temperature >100.4     Remove dressing in 48 hours     Activity as tolerated

## 2023-10-18 NOTE — TRANSFER OF CARE
"Anesthesia Transfer of Care Note    Patient: Khadar Lorenzo    Procedure(s) Performed: Procedure(s) (LRB):  EXCISION, SUPERFICIAL MASS, HEAD (N/A)    Patient location: PACU    Anesthesia Type: MAC    Transport from OR: Transported from OR on 2-3 L/min O2 by NC with adequate spontaneous ventilation    Post pain: adequate analgesia    Post assessment: no apparent anesthetic complications and tolerated procedure well    Post vital signs: stable    Level of consciousness: sedated    Nausea/Vomiting: no nausea/vomiting    Complications: none    Transfer of care protocol was followed      Last vitals:   Visit Vitals  /82 (BP Location: Right arm, Patient Position: Sitting)   Pulse (!) 59   Temp 36.4 °C (97.5 °F) (Skin)   Resp 18   Ht 5' 11" (1.803 m)   Wt 95.3 kg (210 lb)   SpO2 96%   BMI 29.29 kg/m²     "

## 2023-10-18 NOTE — PLAN OF CARE
Patient resting, c/o pain 7/10, PO oxycodone given per MD orders. Patient falls asleep immediately when unstimulated. NAD noted. VS per baseline.

## 2023-10-18 NOTE — OP NOTE
Date of procedure:  October 18, 2023     Staff surgeon:  Dr. Fred Calderón     Preoperative diagnosis: Soft tissue mass over the occipital bone     Postoperative diagnosis:  The same    Procedure:  Excision of soft tissue mass consistent with lipoma     Anesthesia:  Monitored anesthesia     Indication for procedure:  47-year-old gentleman presented my office soft tissue mass the posterior scalp consistent with a lipoma.  He was scheduled for excision on the above-mentioned date.      Description of procedure:   Following signing informed consent he was taken the operating room placed supine position.  Monitored anesthesia was administered without event.  Patient was rolled placed in prone position.  Area of concern over the occipital bone is clipped to expose the soft tissue lesion.  The areas prepped and draped in standard fashion.  Time-out procedures performed.  I make a transverse incision over the palpable defect.  I dissect down to the soft tissue mass which is in the deep tissue extending down to the bone.  I excised the entirety of the lesion which was consistent with a lipoma.  It measures 3 x 2 cm.  I irrigate ensure adequate hemostasis.  Wound was closed in 2 layers with 3-0 Vicryl and 4-0 Monocryl.  Patient was awakened taken recovery room stable condition.  There were no immediate complications.  Blood loss was 15 cc

## 2023-10-18 NOTE — PLAN OF CARE
Patient AAOx4. Resp even, unlabored. Patient states pain 4/10 and tolerable. NAD noted. Patient tolerating PO well. Discharge and follow-up instructions discussed. Patient and wife verbalized understanding. Patient meets criteria for discharge home.

## 2023-10-18 NOTE — H&P
HPI  this is a pleasant 47-year-old gentleman who was referred to me for evaluation of a soft tissue mass on the posterior scalp.  Patient notes that is Haylie noticed a few months ago.  Stated has been persistent since that time.  Feels like it does get larger at times.  Denies any drainage.  Does note some occasional soreness in the area.  Can occasionally since some muscle tension in his curious that this is causing it.  He has no other significant medical or surgical history.  Review of Systems   Constitutional:  Negative for activity change and appetite change.   Cardiovascular:  Negative for chest pain.   Gastrointestinal:  Negative for abdominal distention, abdominal pain, nausea and vomiting.   Musculoskeletal:  Negative for arthralgias and joint swelling.   Hematological:  Negative for adenopathy.                  Objective   Physical Exam  Vitals reviewed.   Cardiovascular:      Rate and Rhythm: Normal rate.      Pulses: Normal pulses.   Pulmonary:      Effort: Pulmonary effort is normal.   Abdominal:      General: Abdomen is flat. Bowel sounds are normal. There is no distension.      Tenderness: There is no abdominal tenderness.      Hernia: No hernia is present.   Skin:     Comments: Soft tissue mass consistent with a lipoma at the posterior scalp   Neurological:      Mental Status: He is alert.   Psychiatric:         Mood and Affect: Mood normal.      US:     Impression:     28 mm probable subcutaneous lipoma in the area of palpable concern over the left posterior occipital region              Assessment and Plan   1. Lipoma, unspecified site  -     Ambulatory referral/consult to General Surgery           Discussion with patient.Will proceed with excision in the OR today

## 2023-10-19 VITALS
BODY MASS INDEX: 29.4 KG/M2 | DIASTOLIC BLOOD PRESSURE: 64 MMHG | HEIGHT: 71 IN | SYSTOLIC BLOOD PRESSURE: 123 MMHG | HEART RATE: 76 BPM | RESPIRATION RATE: 18 BRPM | WEIGHT: 210 LBS | TEMPERATURE: 98 F | OXYGEN SATURATION: 95 %

## 2023-10-29 ENCOUNTER — HOSPITAL ENCOUNTER (EMERGENCY)
Facility: HOSPITAL | Age: 48
Discharge: HOME | End: 2023-10-29
Attending: EMERGENCY MEDICINE
Payer: COMMERCIAL

## 2023-10-29 VITALS
BODY MASS INDEX: 29.4 KG/M2 | TEMPERATURE: 98 F | SYSTOLIC BLOOD PRESSURE: 128 MMHG | OXYGEN SATURATION: 95 % | DIASTOLIC BLOOD PRESSURE: 78 MMHG | RESPIRATION RATE: 18 BRPM | WEIGHT: 210 LBS | HEART RATE: 81 BPM | HEIGHT: 71 IN

## 2023-10-29 DIAGNOSIS — T79.2XXA SEROMA DUE TO TRAUMA: Primary | ICD-10-CM

## 2023-10-29 PROCEDURE — 25000003 PHARM REV CODE 250: Performed by: EMERGENCY MEDICINE

## 2023-10-29 PROCEDURE — 99283 EMERGENCY DEPT VISIT LOW MDM: CPT | Mod: 25

## 2023-10-29 PROCEDURE — 10140 I&D HMTMA SEROMA/FLUID COLLJ: CPT

## 2023-10-29 RX ORDER — HYDROCODONE BITARTRATE AND ACETAMINOPHEN 10; 325 MG/1; MG/1
1 TABLET ORAL
Status: COMPLETED | OUTPATIENT
Start: 2023-10-29 | End: 2023-10-29

## 2023-10-29 RX ORDER — LIDOCAINE HYDROCHLORIDE 10 MG/ML
10 INJECTION INFILTRATION; PERINEURAL
Status: COMPLETED | OUTPATIENT
Start: 2023-10-29 | End: 2023-10-29

## 2023-10-29 RX ADMIN — LIDOCAINE HYDROCHLORIDE 10 ML: 10 INJECTION, SOLUTION INFILTRATION; PERINEURAL at 09:10

## 2023-10-29 RX ADMIN — HYDROCODONE BITARTRATE AND ACETAMINOPHEN 1 TABLET: 10; 325 TABLET ORAL at 09:10

## 2023-10-30 ENCOUNTER — PATIENT MESSAGE (OUTPATIENT)
Dept: SURGERY | Facility: CLINIC | Age: 48
End: 2023-10-30
Payer: COMMERCIAL

## 2023-10-30 ENCOUNTER — HOSPITAL ENCOUNTER (EMERGENCY)
Facility: HOSPITAL | Age: 48
Discharge: HOME | End: 2023-10-30
Attending: EMERGENCY MEDICINE
Payer: COMMERCIAL

## 2023-10-30 VITALS
HEIGHT: 71 IN | HEART RATE: 84 BPM | WEIGHT: 210 LBS | SYSTOLIC BLOOD PRESSURE: 117 MMHG | RESPIRATION RATE: 17 BRPM | OXYGEN SATURATION: 97 % | TEMPERATURE: 98 F | BODY MASS INDEX: 29.4 KG/M2 | DIASTOLIC BLOOD PRESSURE: 62 MMHG

## 2023-10-30 DIAGNOSIS — T79.2XXA SEROMA DUE TO TRAUMA: Primary | ICD-10-CM

## 2023-10-30 PROCEDURE — 99284 EMERGENCY DEPT VISIT MOD MDM: CPT

## 2023-10-30 PROCEDURE — 25000003 PHARM REV CODE 250: Performed by: EMERGENCY MEDICINE

## 2023-10-30 RX ORDER — OXYCODONE HYDROCHLORIDE 10 MG/1
10 TABLET ORAL
Status: COMPLETED | OUTPATIENT
Start: 2023-10-30 | End: 2023-10-30

## 2023-10-30 RX ORDER — CEPHALEXIN 250 MG/1
500 CAPSULE ORAL
Status: COMPLETED | OUTPATIENT
Start: 2023-10-30 | End: 2023-10-30

## 2023-10-30 RX ORDER — LIDOCAINE HCL/EPINEPHRINE/PF 2%-1:200K
10 VIAL (ML) INJECTION ONCE
Status: COMPLETED | OUTPATIENT
Start: 2023-10-30 | End: 2023-10-30

## 2023-10-30 RX ORDER — HYDROCODONE BITARTRATE AND ACETAMINOPHEN 7.5; 325 MG/1; MG/1
1 TABLET ORAL EVERY 6 HOURS PRN
Qty: 12 TABLET | Refills: 0 | Status: SHIPPED | OUTPATIENT
Start: 2023-10-30

## 2023-10-30 RX ORDER — CEPHALEXIN 500 MG/1
500 CAPSULE ORAL 4 TIMES DAILY
Qty: 20 CAPSULE | Refills: 0 | Status: SHIPPED | OUTPATIENT
Start: 2023-10-30 | End: 2023-11-04

## 2023-10-30 RX ADMIN — CEPHALEXIN 500 MG: 250 CAPSULE ORAL at 04:10

## 2023-10-30 RX ADMIN — OXYCODONE HYDROCHLORIDE 10 MG: 10 TABLET ORAL at 04:10

## 2023-10-30 RX ADMIN — LIDOCAINE HYDROCHLORIDE,EPINEPHRINE BITARTRATE 10 ML: 20; .005 INJECTION, SOLUTION EPIDURAL; INFILTRATION; INTRACAUDAL; PERINEURAL at 04:10

## 2023-10-30 NOTE — ED PROVIDER NOTES
Encounter Date: 10/29/2023       History     Chief Complaint   Patient presents with    Post-op Problem     Pt reports lipoma removed last week, swelling/ discomfort  doubled in last 24 hours     47-year-old male history anxiety and depression and recent lipoma excision to the occiput about 10 days ago presents with pain and swelling that started yesterday.  No fever no chills.  Took 800 mg of Motrin earlier with some improvement.    The history is provided by the patient.     Review of patient's allergies indicates:  No Known Allergies  Past Medical History:   Diagnosis Date    Anxiety     Depression     GERD (gastroesophageal reflux disease)     Hyperlipidemia     Hypotestosteronemia     Hypothyroidism     Nephrolithiasis     2 episodes    Obesity     Snoring     Syncope and collapse      Past Surgical History:   Procedure Laterality Date    CYST REMOVAL      left wrist    EXCISION, SUPERFICIAL MASS, HEAD N/A 10/18/2023    Procedure: EXCISION, SUPERFICIAL MASS, HEAD;  Surgeon: Fred Calderón MD;  Location: [facility];  Service: General;  Laterality: N/A;  posterior scalp    FRACTURE SURGERY      screw in right thumb    testopel      by Dr. Martini in the past for his hypogonadism.    TONSILLECTOMY, ADENOIDECTOMY, BILATERAL MYRINGOTOMY AND TUBES       Family History   Problem Relation Age of Onset    Cancer Father     Hypertension Father     Hyperlipidemia Father     Skin cancer Father     Drug abuse Maternal Uncle     Arthritis Maternal Grandmother     Mental illness Maternal Grandfather     Alcohol abuse Paternal Grandfather     Melanoma Neg Hx     Psoriasis Neg Hx     Lupus Neg Hx     Eczema Neg Hx      Social History     Tobacco Use    Smoking status: Former    Smokeless tobacco: Current     Types: Chew    Tobacco comments:     chew everyday, 0.25 can    Substance Use Topics    Alcohol use: Yes     Alcohol/week: 1.0 standard drink of alcohol     Types: 1 Cans of beer per week     Comment: socially    Drug use:  No     Review of Systems   Constitutional:  Negative for chills, fatigue and fever.   HENT:          Occipital swelling   Musculoskeletal:  Negative for neck pain.       Physical Exam     Initial Vitals [10/29/23 2007]   BP Pulse Resp Temp SpO2   132/81 81 16 97.9 °F (36.6 °C) 96 %      MAP       --         Physical Exam    Nursing note and vitals reviewed.  Constitutional: He appears well-developed and well-nourished. He is not diaphoretic.  Non-toxic appearance. He does not have a sickly appearance. He does not appear ill. No distress.   HENT:   Head: Normocephalic and atraumatic.       Spongy swelling to the occiput consistent with a seroma on exam.  Incision is intact without any dehiscence or erythema or discharge.   Eyes: EOM are normal.   Neck: Neck supple.   Normal range of motion.  Pulmonary/Chest: No respiratory distress.   Musculoskeletal:         General: Normal range of motion.      Cervical back: Normal range of motion and neck supple. No rigidity. Normal range of motion.     Neurological: He is alert and oriented to person, place, and time.   Skin: Skin is warm and dry. No rash noted.   Psychiatric: He has a normal mood and affect. His behavior is normal. Judgment and thought content normal.         ED Course   I & D - Incision and Drainage    Date/Time: 10/29/2023 8:03 PM  Location procedure was performed: [facility] EMERGENCY DEPARTMENT    Performed by: Livan Gonzalez MD  Authorized by: Livan Gonzalez MD  Consent Done: Yes  Consent: Verbal consent obtained. Written consent not obtained.  Consent given by: patient  Patient understanding: patient states understanding of the procedure being performed  Patient consent: the patient's understanding of the procedure matches consent given  Procedure consent: procedure consent matches procedure scheduled  Relevant documents: relevant documents present and verified  Patient identity confirmed: verbally with patient  Type: seroma  Body area:  head/neck  Location details: scalp  Anesthesia: local infiltration    Anesthesia:  Local Anesthetic: lidocaine 1% without epinephrine  Anesthetic total: 5 mL    Patient sedated: no  Scalpel size: 18 ga needle.  Incision depth: subcutaneous  Complexity: simple  Drainage: serous and bloody  Drainage amount: moderate (10ml)    Incision depth: subcutaneous        Labs Reviewed - No data to display       Imaging Results    None          Medications   HYDROcodone-acetaminophen  mg per tablet 1 tablet (1 tablet Oral Given 10/29/23 2140)   LIDOcaine HCL 10 mg/ml (1%) injection 10 mL (10 mLs Infiltration Given by Other 10/29/23 2130)     Medical Decision Making  47-year-old male presents to the ER with occipital swelling and pain for 1 day.  Recent lipoma surgery 10 days ago.  Bedside aspiration demonstrates bloody straw-colored fluid consistent with a seroma.  He has no overlying erythema and I do not suspect infection.  I see no reason for antibiotics.  Patient and spouse are advised of chance of reaccumulation.  He can come back to the ER if necessary.    Amount and/or Complexity of Data Reviewed  Discussion of management or test interpretation with external provider(s): 2126 Case discussed with Dr. Calderón who agrees with aspiration [EF]      Risk  Prescription drug management.               ED Course as of 10/29/23 2256   Sun Oct 29, 2023   2114 BP: 132/81 [EF]   2114 Temp: 97.9 °F (36.6 °C) [EF]   2114 Temp Source: Temporal [EF]   2114 Pulse: 81 [EF]   2114 Resp: 16 [EF]   2114 SpO2: 96 % [EF]   2126 Case discussed with Dr. Calderón who agrees with aspiration [EF]      ED Course User Index  [EF] Livan Gonzalez MD                    Clinical Impression:   Final diagnoses:  [T79.2XXA] Seroma due to trauma (Primary)        ED Disposition Condition    Discharge Stable          ED Prescriptions    None       Follow-up Information       Follow up With Specialties Details Why Contact Info Additional Information     [facility] -  Emergency Medicine  As needed, If symptoms worsen [address] [place] [address] [address] [place] [id number] 1st floor             Carlos, Livan BRITTON MD  10/29/23 0791

## 2023-10-30 NOTE — ED NOTES
Pt in possession of all belongings.  VSS; no signs of distress.  Pt to be escorted home by family in personal auto.  Discharge instructions, follow-up instructions, and medications given to pt and explained by RN; pt verbalized understanding.  Pt agreed with care and discharge.

## 2023-10-30 NOTE — ED PROVIDER NOTES
Encounter Date: 10/30/2023       History     Chief Complaint   Patient presents with    Wound Check     Seen here last night      Chief complaint:  Scalp swelling    HPI:  47-year-old male returns with worsening pain and swelling of the occiput.  He underwent an excision of a lipoma on 10/18/2023.  He had swelling with worsening pain and was seen here yesterday.  The swelling was aspirated with bloody serous fluid with symptomatic improvement.  He reports that the fluid has reaccumulated and the pain has worsened.  He denies any fever, night sweats or chills.  Past medical history is significant for anxiety/depression, GERD, hyperlipidemia and hypothyroidism.      Review of patient's allergies indicates:  No Known Allergies  Past Medical History:   Diagnosis Date    Anxiety     Depression     GERD (gastroesophageal reflux disease)     Hyperlipidemia     Hypotestosteronemia     Hypothyroidism     Nephrolithiasis     2 episodes    Obesity     Snoring     Syncope and collapse      Past Surgical History:   Procedure Laterality Date    CYST REMOVAL      left wrist    EXCISION, SUPERFICIAL MASS, HEAD N/A 10/18/2023    Procedure: EXCISION, SUPERFICIAL MASS, HEAD;  Surgeon: Fred Calderón MD;  Location: [facility];  Service: General;  Laterality: N/A;  posterior scalp    FRACTURE SURGERY      screw in right thumb    testopel      by Dr. Martini in the past for his hypogonadism.    TONSILLECTOMY, ADENOIDECTOMY, BILATERAL MYRINGOTOMY AND TUBES       Family History   Problem Relation Age of Onset    Cancer Father     Hypertension Father     Hyperlipidemia Father     Skin cancer Father     Drug abuse Maternal Uncle     Arthritis Maternal Grandmother     Mental illness Maternal Grandfather     Alcohol abuse Paternal Grandfather     Melanoma Neg Hx     Psoriasis Neg Hx     Lupus Neg Hx     Eczema Neg Hx      Social History     Tobacco Use    Smoking status: Former    Smokeless tobacco: Current     Types: Chew    Tobacco comments:      chew everyday, 0.25 can    Substance Use Topics    Alcohol use: Yes     Alcohol/week: 1.0 standard drink of alcohol     Types: 1 Cans of beer per week     Comment: socially    Drug use: No     Review of Systems   Constitutional:  Negative for fever.   Respiratory:  Negative for shortness of breath.    Genitourinary:  Negative for flank pain.   Musculoskeletal:  Negative for gait problem.   Skin:  Positive for wound. Negative for color change, pallor and rash.   Neurological:  Negative for weakness.   Psychiatric/Behavioral:  Negative for confusion.        Physical Exam     Initial Vitals [10/30/23 1436]   BP Pulse Resp Temp SpO2   123/63 92 18 98.3 °F (36.8 °C) 95 %      MAP       --         Physical Exam    Constitutional: Vital signs are normal. He is not diaphoretic.  Non-toxic appearance. He does not have a sickly appearance. No distress.   HENT:   Head: Normocephalic and atraumatic.   4 cm fluctuant nodule of the occiput with no erythema, calor or exudate   Eyes: Conjunctivae are normal.   Neck: Phonation normal. Neck supple. No thyromegaly present. No tracheal deviation present.   Cardiovascular:  Normal rate.           Musculoskeletal:      Cervical back: Neck supple.     Neurological: He is alert and oriented to person, place, and time.   Skin: Skin is warm, dry and intact. No pallor.   Psychiatric: He has a normal mood and affect. His speech is normal and behavior is normal. Thought content normal.           ED Course   Procedures  Labs Reviewed - No data to display       Imaging Results    None          Medications   oxyCODONE immediate release tablet Tab 10 mg (10 mg Oral Given 10/30/23 1611)   cephALEXin capsule 500 mg (500 mg Oral Given 10/30/23 1616)   LIDOcaine-EPINEPHrine (PF) 2%-1:200,000 injection 10 mL (10 mLs Intradermal Given by Other 10/30/23 1616)     Medical Decision Making  47-year-old male returns with occipital swelling status post lipoma incision on 10/18/2023.  I discussed the case  with Dr. Calderón who offered options including repeat aspiration with follow-up, definitive incision and drainage and a trial of oral antibiotics.  He elected to have the seroma aspirated and is to see Dr. Calderón tomorrow.  The occipital scalp is prepped with Betadine, anesthetized with 2 cc of 2% lidocaine with epinephrine.  The seroma is aspirated with 10 cc of serosanguineous fluid removed with symptomatic improvement.  He is to see Dr. Calderón tomorrow.    Risk  Prescription drug management.                               Clinical Impression:   Final diagnoses:  [T79.2XXA] Seroma due to trauma (Primary)        ED Disposition Condition    Discharge Stable          ED Prescriptions       Medication Sig Dispense Start Date End Date Auth. Provider    cephALEXin (KEFLEX) 500 MG capsule Take 1 capsule (500 mg total) by mouth 4 (four) times daily. for 5 days 20 capsule 10/30/2023 11/4/2023 Karl Griffith III, MD    HYDROcodone-acetaminophen (NORCO) 7.5-325 mg per tablet Take 1 tablet by mouth every 6 (six) hours as needed for Pain. 12 tablet 10/30/2023 -- Karl Griffith III, MD          Follow-up Information       Follow up With Specialties Details Why Contact Info    Fred Calderón MD General Surgery, Colon and Rectal Surgery, Surgery In 1 day  [address]  [address]  [facility] [id number]  [phone]               Karl Griffith III, MD  10/30/23 2911

## 2023-10-30 NOTE — DISCHARGE INSTRUCTIONS
You have a seroma that is common following surgery.  This reaccumulates and is uncomfortable you can come back to the ER for drainage

## 2023-10-31 ENCOUNTER — OFFICE VISIT (OUTPATIENT)
Dept: SURGERY | Facility: CLINIC | Age: 48
End: 2023-10-31
Payer: COMMERCIAL

## 2023-10-31 VITALS
DIASTOLIC BLOOD PRESSURE: 74 MMHG | WEIGHT: 213.63 LBS | BODY MASS INDEX: 29.91 KG/M2 | HEART RATE: 75 BPM | HEIGHT: 71 IN | SYSTOLIC BLOOD PRESSURE: 120 MMHG | TEMPERATURE: 97 F

## 2023-10-31 DIAGNOSIS — Z09 POSTOP CHECK: Primary | ICD-10-CM

## 2023-10-31 LAB
FINAL PATHOLOGIC DIAGNOSIS: NORMAL
GROSS: NORMAL
Lab: NORMAL
MICROSCOPIC EXAM: NORMAL

## 2023-10-31 PROCEDURE — 3044F HG A1C LEVEL LT 7.0%: CPT | Mod: CPTII,S$GLB,, | Performed by: SURGERY

## 2023-10-31 PROCEDURE — 99024 POSTOP FOLLOW-UP VISIT: CPT | Mod: S$GLB,,, | Performed by: SURGERY

## 2023-10-31 PROCEDURE — 3044F PR MOST RECENT HEMOGLOBIN A1C LEVEL <7.0%: ICD-10-PCS | Mod: CPTII,S$GLB,, | Performed by: SURGERY

## 2023-10-31 PROCEDURE — 1159F MED LIST DOCD IN RCRD: CPT | Mod: CPTII,S$GLB,, | Performed by: SURGERY

## 2023-10-31 PROCEDURE — 99999 PR PBB SHADOW E&M-EST. PATIENT-LVL IV: CPT | Mod: PBBFAC,,, | Performed by: SURGERY

## 2023-10-31 PROCEDURE — 3078F DIAST BP <80 MM HG: CPT | Mod: CPTII,S$GLB,, | Performed by: SURGERY

## 2023-10-31 PROCEDURE — 99999 PR PBB SHADOW E&M-EST. PATIENT-LVL IV: ICD-10-PCS | Mod: PBBFAC,,, | Performed by: SURGERY

## 2023-10-31 PROCEDURE — 3074F PR MOST RECENT SYSTOLIC BLOOD PRESSURE < 130 MM HG: ICD-10-PCS | Mod: CPTII,S$GLB,, | Performed by: SURGERY

## 2023-10-31 PROCEDURE — 99024 PR POST-OP FOLLOW-UP VISIT: ICD-10-PCS | Mod: S$GLB,,, | Performed by: SURGERY

## 2023-10-31 PROCEDURE — 3074F SYST BP LT 130 MM HG: CPT | Mod: CPTII,S$GLB,, | Performed by: SURGERY

## 2023-10-31 PROCEDURE — 3078F PR MOST RECENT DIASTOLIC BLOOD PRESSURE < 80 MM HG: ICD-10-PCS | Mod: CPTII,S$GLB,, | Performed by: SURGERY

## 2023-10-31 PROCEDURE — 1159F PR MEDICATION LIST DOCUMENTED IN MEDICAL RECORD: ICD-10-PCS | Mod: CPTII,S$GLB,, | Performed by: SURGERY

## 2023-10-31 NOTE — PROGRESS NOTES
Cc: post op    HPI: 47 y.o.  male  2 weeks s/p excision of soft tissue lipoma on posterior scalp.     Pt having some swelling and drainage from incision which has worsened over the past 2 days.      PE: AFVSS    AAOx3  CTA  Soft/NT/nd  Inc: c/d/i   draining a mild amount of old blood.  Able to express more fluid out      Path: pending    A/P:   Pt doing well post surgery.   F/U  with me in 2 weeks.

## 2023-11-03 ENCOUNTER — OCCUPATIONAL HEALTH (OUTPATIENT)
Dept: URGENT CARE | Facility: CLINIC | Age: 48
End: 2023-11-03

## 2023-11-03 DIAGNOSIS — Z76.89 RETURN TO WORK EVALUATION: Primary | ICD-10-CM

## 2023-11-03 PROCEDURE — 99499 UNLISTED E&M SERVICE: CPT | Mod: S$GLB,,, | Performed by: NURSE PRACTITIONER

## 2023-11-03 PROCEDURE — 99499 RETURN TO WORK EXAM: BASIC: ICD-10-PCS | Mod: S$GLB,,, | Performed by: NURSE PRACTITIONER

## 2023-11-16 ENCOUNTER — PATIENT MESSAGE (OUTPATIENT)
Dept: FAMILY MEDICINE | Facility: CLINIC | Age: 48
End: 2023-11-16
Payer: COMMERCIAL

## 2023-11-21 ENCOUNTER — OCCUPATIONAL HEALTH (OUTPATIENT)
Dept: URGENT CARE | Facility: CLINIC | Age: 48
End: 2023-11-21
Payer: COMMERCIAL

## 2023-11-21 DIAGNOSIS — Z00.00 ENCOUNTER FOR PHYSICAL EXAMINATION: Primary | ICD-10-CM

## 2023-11-21 LAB — COLLECTION ONLY: NORMAL

## 2023-11-21 PROCEDURE — 99499 UNLISTED E&M SERVICE: CPT | Mod: S$GLB,,, | Performed by: NURSE PRACTITIONER

## 2023-11-21 PROCEDURE — 99499 DOT PHYSICAL: ICD-10-PCS | Mod: S$GLB,,, | Performed by: NURSE PRACTITIONER

## 2023-12-11 ENCOUNTER — PATIENT MESSAGE (OUTPATIENT)
Dept: UROLOGY | Facility: CLINIC | Age: 48
End: 2023-12-11

## 2023-12-11 ENCOUNTER — OFFICE VISIT (OUTPATIENT)
Dept: UROLOGY | Facility: CLINIC | Age: 48
End: 2023-12-11
Payer: COMMERCIAL

## 2023-12-11 DIAGNOSIS — N52.9 ERECTILE DYSFUNCTION, UNSPECIFIED ERECTILE DYSFUNCTION TYPE: ICD-10-CM

## 2023-12-11 DIAGNOSIS — E29.1 MALE HYPOGONADISM: Primary | ICD-10-CM

## 2023-12-11 PROCEDURE — 3044F PR MOST RECENT HEMOGLOBIN A1C LEVEL <7.0%: ICD-10-PCS | Mod: CPTII,S$GLB,, | Performed by: NURSE PRACTITIONER

## 2023-12-11 PROCEDURE — 1159F PR MEDICATION LIST DOCUMENTED IN MEDICAL RECORD: ICD-10-PCS | Mod: CPTII,S$GLB,, | Performed by: NURSE PRACTITIONER

## 2023-12-11 PROCEDURE — 99999 PR PBB SHADOW E&M-EST. PATIENT-LVL III: ICD-10-PCS | Mod: PBBFAC,,, | Performed by: NURSE PRACTITIONER

## 2023-12-11 PROCEDURE — 1160F PR REVIEW ALL MEDS BY PRESCRIBER/CLIN PHARMACIST DOCUMENTED: ICD-10-PCS | Mod: CPTII,S$GLB,, | Performed by: NURSE PRACTITIONER

## 2023-12-11 PROCEDURE — 3044F HG A1C LEVEL LT 7.0%: CPT | Mod: CPTII,S$GLB,, | Performed by: NURSE PRACTITIONER

## 2023-12-11 PROCEDURE — 1160F RVW MEDS BY RX/DR IN RCRD: CPT | Mod: CPTII,S$GLB,, | Performed by: NURSE PRACTITIONER

## 2023-12-11 PROCEDURE — 99214 OFFICE O/P EST MOD 30 MIN: CPT | Mod: S$GLB,,, | Performed by: NURSE PRACTITIONER

## 2023-12-11 PROCEDURE — 99214 PR OFFICE/OUTPT VISIT, EST, LEVL IV, 30-39 MIN: ICD-10-PCS | Mod: S$GLB,,, | Performed by: NURSE PRACTITIONER

## 2023-12-11 PROCEDURE — 99999 PR PBB SHADOW E&M-EST. PATIENT-LVL III: CPT | Mod: PBBFAC,,, | Performed by: NURSE PRACTITIONER

## 2023-12-11 PROCEDURE — 1159F MED LIST DOCD IN RCRD: CPT | Mod: CPTII,S$GLB,, | Performed by: NURSE PRACTITIONER

## 2023-12-11 RX ORDER — TESTOSTERONE CYPIONATE 200 MG/ML
200 INJECTION, SOLUTION INTRAMUSCULAR
Qty: 10 ML | Refills: 2 | Status: SHIPPED | OUTPATIENT
Start: 2023-12-11

## 2023-12-11 NOTE — PROGRESS NOTES
[facility] Note  Staff: PRISCILLA Damon    PCP: PEDRITO Strickland    Chief Complaint: Low testosterone    Subjective:        HPI: Khadar Lorenzo is a 48 y.o. male who is here for an evaluation for testosterone. Last seen 10/2022     With regards to hypogonadism:  Pt reports dx about 10 years. Unknown etiology.   Don't have initial evaluation records available.     Treatment so far: testosterone injections. Different doses over the years     Risks: potential AGGIE, obesity     Reproductive hx:  No prior h/o hypogonadism.  Normal development.  Not trying for kids.  Imaging: empty sella noted 8/5/2016     No h/o adrenal tumors  No h/o liver and kidney disease  No h/o hyperthyroidism (has hypothyroidism)     No medications or herbal products associated with hypogonadism or gynecomastia.  Pt denies taking performance-enhancing drugs.     Current regimen:  Testosterone cypionate 200 mg IM q10 days.     Has hypothyroidism  Levothyroxine 75 mcg/day    TODAY:  No UA given in clinic.  Last PSA on 10/12/23 was 1.6, 1.9 on 6/6/22.     REVIEW OF SYSTEMS:  A comprehensive 10 system review was performed and is negative except as noted above in HPI    PMHx:  Past Medical History:   Diagnosis Date    Anxiety     Depression     GERD (gastroesophageal reflux disease)     Hyperlipidemia     Hypotestosteronemia     Hypothyroidism     Nephrolithiasis     2 episodes    Obesity     Snoring     Syncope and collapse      PSHx:  Past Surgical History:   Procedure Laterality Date    CYST REMOVAL      left wrist    EXCISION, SUPERFICIAL MASS, HEAD N/A 10/18/2023    Procedure: EXCISION, SUPERFICIAL MASS, HEAD;  Surgeon: Fred Calderón MD;  Location: [facility];  Service: General;  Laterality: N/A;  posterior scalp    FRACTURE SURGERY      screw in right thumb    testopel      by Dr. Martini in the past for his hypogonadism.    TONSILLECTOMY, ADENOIDECTOMY, BILATERAL MYRINGOTOMY AND TUBES       Allergies:  Patient  has no known allergies.    Medications: reviewed   Objective:   There were no vitals filed for this visit.    General:WDWN in NAD  Eyes: PERRLA, normal conjunctiva  Respiratory: no increased work on breathing, clear to auscultation  Cardiovascular: regular rate and rhythm. No obvious extremity edema.  GI: palpation of masses. No tenderness. No hepatosplenomegaly to palpation.  Musculoskeletal: normal range of motion of bilateral upper extremities. Normal muscle strength and tone.  Skin: no obvious rashes or lesions. No tightening of skin noted.  Neurologic: CN grossly normal. Normal sensation.   Psychiatric: awake, alert and oriented x 3. Mood and affect normal. Cooperative.    Assessment:       1. Male hypogonadism    2. Erectile dysfunction, unspecified erectile dysfunction type          Plan:     Testosterone Cypionate 200 mg IM every 10 days refilled and faxed to [facility] on conclusion of ov today.    Labs to be performed within the next few days for recheck and then again in 6 mos with f/up ov after lab work has been completed at that time.  Pt verbalized understanding.    [facility]: Active    Bell Knox, PRISCILLA

## 2023-12-15 ENCOUNTER — LAB VISIT (OUTPATIENT)
Dept: LAB | Facility: HOSPITAL | Age: 48
End: 2023-12-15
Attending: NURSE PRACTITIONER
Payer: COMMERCIAL

## 2023-12-15 DIAGNOSIS — E29.1 MALE HYPOGONADISM: ICD-10-CM

## 2023-12-15 DIAGNOSIS — N52.9 ERECTILE DYSFUNCTION, UNSPECIFIED ERECTILE DYSFUNCTION TYPE: ICD-10-CM

## 2023-12-15 LAB
ERYTHROCYTE [DISTWIDTH] IN BLOOD BY AUTOMATED COUNT: 13.2 % (ref 11.5–14.5)
HCT VFR BLD AUTO: 48.5 % (ref 40–54)
HGB BLD-MCNC: 15.8 G/DL (ref 14–18)
MCH RBC QN AUTO: 28 PG (ref 27–31)
MCHC RBC AUTO-ENTMCNC: 32.6 G/DL (ref 32–36)
MCV RBC AUTO: 86 FL (ref 82–98)
PLATELET # BLD AUTO: 197 K/UL (ref 150–450)
PMV BLD AUTO: 9.2 FL (ref 9.2–12.9)
RBC # BLD AUTO: 5.64 M/UL (ref 4.6–6.2)
WBC # BLD AUTO: 7.32 K/UL (ref 3.9–12.7)

## 2023-12-15 PROCEDURE — 36415 COLL VENOUS BLD VENIPUNCTURE: CPT | Performed by: NURSE PRACTITIONER

## 2023-12-15 PROCEDURE — 84403 ASSAY OF TOTAL TESTOSTERONE: CPT | Performed by: NURSE PRACTITIONER

## 2023-12-15 PROCEDURE — 85027 COMPLETE CBC AUTOMATED: CPT | Performed by: NURSE PRACTITIONER

## 2023-12-17 LAB — TESTOST SERPL-MCNC: 426 NG/DL (ref 264–916)

## 2023-12-19 DIAGNOSIS — E29.1 MALE HYPOGONADISM: Primary | ICD-10-CM

## 2024-01-13 DIAGNOSIS — E78.1 HYPERTRIGLYCERIDEMIA: ICD-10-CM

## 2024-01-17 RX ORDER — FENOFIBRATE 160 MG/1
160 TABLET ORAL DAILY
Qty: 90 TABLET | Refills: 3 | Status: SHIPPED | OUTPATIENT
Start: 2024-01-17

## 2024-01-17 NOTE — TELEPHONE ENCOUNTER
Refill Routing Note   Medication(s) are not appropriate for processing by [facility] for the following reason(s):        Non-participating provider    [facility] action(s):  Route               Appointments  past 12m or future 3m with PCP    Date Provider   Last Visit   10/12/2023 Talita Strickland NP   Next Visit   4/18/2024 Talita Strickland NP   ED visits in past 90 days: 2        Note composed:6:28 PM 01/16/2024

## 2024-01-18 ENCOUNTER — DOCUMENTATION ONLY (OUTPATIENT)
Dept: UROLOGY | Facility: CLINIC | Age: 49
End: 2024-01-18
Payer: COMMERCIAL

## 2024-01-18 ENCOUNTER — PATIENT MESSAGE (OUTPATIENT)
Dept: UROLOGY | Facility: CLINIC | Age: 49
End: 2024-01-18
Payer: COMMERCIAL

## 2024-01-18 ENCOUNTER — PATIENT MESSAGE (OUTPATIENT)
Dept: FAMILY MEDICINE | Facility: CLINIC | Age: 49
End: 2024-01-18
Payer: COMMERCIAL

## 2024-02-06 ENCOUNTER — ON-DEMAND VIRTUAL (OUTPATIENT)
Dept: URGENT CARE | Facility: CLINIC | Age: 49
End: 2024-02-06
Payer: COMMERCIAL

## 2024-02-06 DIAGNOSIS — U07.1 COVID: Primary | ICD-10-CM

## 2024-02-06 PROCEDURE — 99213 OFFICE O/P EST LOW 20 MIN: CPT | Mod: 95,,, | Performed by: FAMILY MEDICINE

## 2024-02-06 RX ORDER — NIRMATRELVIR AND RITONAVIR 300-100 MG
KIT ORAL
Qty: 30 TABLET | Refills: 0 | Status: SHIPPED | OUTPATIENT
Start: 2024-02-06 | End: 2024-02-11

## 2024-02-07 ENCOUNTER — PATIENT OUTREACH (OUTPATIENT)
Dept: ADMINISTRATIVE | Facility: HOSPITAL | Age: 49
End: 2024-02-07
Payer: COMMERCIAL

## 2024-02-07 ENCOUNTER — PATIENT MESSAGE (OUTPATIENT)
Dept: FAMILY MEDICINE | Facility: CLINIC | Age: 49
End: 2024-02-07
Payer: COMMERCIAL

## 2024-02-07 RX ORDER — PROMETHAZINE HYDROCHLORIDE AND DEXTROMETHORPHAN HYDROBROMIDE 6.25; 15 MG/5ML; MG/5ML
5 SYRUP ORAL EVERY 4 HOURS PRN
Qty: 240 ML | Refills: 0 | Status: SHIPPED | OUTPATIENT
Start: 2024-02-07 | End: 2024-02-17

## 2024-02-07 NOTE — PROGRESS NOTES
Subjective:      Patient ID: Khadar Lorenzo is a 48 y.o. male.    Vitals:  vitals were not taken for this visit.     Chief Complaint: Fever (102)      Visit Type: TELE AUDIOVISUAL    Present with the patient at the time of consultation: TELEMED PRESENT WITH PATIENT: family member    Past Medical History:   Diagnosis Date    Anxiety     Depression     GERD (gastroesophageal reflux disease)     Hyperlipidemia     Hypotestosteronemia     Hypothyroidism     Nephrolithiasis     2 episodes    Obesity     Snoring     Syncope and collapse      Past Surgical History:   Procedure Laterality Date    CYST REMOVAL      left wrist    EXCISION, SUPERFICIAL MASS, HEAD N/A 10/18/2023    Procedure: EXCISION, SUPERFICIAL MASS, HEAD;  Surgeon: Fred Calderón MD;  Location: [facility] OR;  Service: General;  Laterality: N/A;  posterior scalp    FRACTURE SURGERY      screw in right thumb    testopel      by Dr. Martini in the past for his hypogonadism.    TONSILLECTOMY, ADENOIDECTOMY, BILATERAL MYRINGOTOMY AND TUBES       Review of patient's allergies indicates:  No Known Allergies  Current Outpatient Medications on File Prior to Visit   Medication Sig Dispense Refill    fenofibrate 160 MG Tab Take 1 tablet (160 mg total) by mouth once daily. 90 tablet 3    levothyroxine (SYNTHROID) 75 MCG tablet TAKE 1 TABLET BEFORE BREAKFAST 90 tablet 3    omeprazole (PRILOSEC) 40 MG capsule TAKE 1 CAPSULE DAILY 90 capsule 3    testosterone cypionate (DEPOTESTOTERONE CYPIONATE) 200 mg/mL injection Inject 1 mL (200 mg total) into the muscle every 10 days. 10 mL 2    cyclobenzaprine (FLEXERIL) 10 MG tablet Take 10 mg by mouth 3 (three) times daily as needed for Muscle spasms.      HYDROcodone-acetaminophen (NORCO) 7.5-325 mg per tablet Take 1 tablet by mouth every 6 (six) hours as needed for Pain. (Patient not taking: Reported on 2/6/2024) 12 tablet 0    OLUX-E 0.05 % topical foam Apply 0.05 % topically continuous prn.      [DISCONTINUED] meloxicam  (MOBIC) 15 MG tablet Take 1 tablet (15 mg total) by mouth daily as needed for Pain. 30 tablet 2     No current facility-administered medications on file prior to visit.     Family History   Problem Relation Age of Onset    Cancer Father     Hypertension Father     Hyperlipidemia Father     Skin cancer Father     Drug abuse Maternal Uncle     Arthritis Maternal Grandmother     Mental illness Maternal Grandfather     Alcohol abuse Paternal Grandfather     Melanoma Neg Hx     Psoriasis Neg Hx     Lupus Neg Hx     Eczema Neg Hx        Medications Ordered                [id number] - [place], [place]  [address] [facility] [address]   [address], [place] [address]    Telephone: [phone]   Fax: [phone]   Hours: Not open 24 hours                         E-Prescribed (1 of 1)              nirmatrelvir-ritonavir (PAXLOVID) 300 mg (150 mg x 2)-100 mg copackaged tablets (EUA)    Sig: Take 3 tablets by mouth 2 (two) times daily. Each dose contains 2 nirmatrelvir (pink tablets) and 1 ritonavir (white tablet). Take all 3 tablets together       Start: 2/6/24     Quantity: 30 tablet Refills: 0                           [facility] Peq Odvv Intake    2/6/2024  6:02 PM CST - Filed by Patient   What is your current physical address in the event of a medical emergency? [address] [address] [address]   Are you able to take your vital signs? No   Please attach any relevant images or files          192  a few  hours ago, home covid pos,  first time covid infection, , had  vaccination  last one was 2020.  Desire paxlovid,      Possible high price and  side effect explain to pt,     Fever   Pertinent negatives include no coughing or wheezing.       Constitution: Positive for chills and fever.   Respiratory:  Negative for cough, shortness of breath, stridor and wheezing.         Objective:   The physical exam was conducted virtually.  Physical Exam   Constitutional: He is oriented to person,  place, and time.   HENT:   Head: Normocephalic and atraumatic.   Eyes: Conjunctivae are normal.   Neck: Neck supple.   Pulmonary/Chest: Effort normal.   Abdominal: Normal appearance.   Neurological: no focal deficit. He is alert and oriented to person, place, and time.   Psychiatric: Mood and thought content normal.       Assessment:     1. COVID        Plan:       COVID  -     nirmatrelvir-ritonavir (PAXLOVID) 300 mg (150 mg x 2)-100 mg copackaged tablets (EUA); Take 3 tablets by mouth 2 (two) times daily. Each dose contains 2 nirmatrelvir (pink tablets) and 1 ritonavir (white tablet). Take all 3 tablets together  Dispense: 30 tablet; Refill: 0

## 2024-02-07 NOTE — PROGRESS NOTES
Population Health Chart Review & Patient Outreach Details    Outreach Performed: YES Letter    Additional Pop Health Notes:           Updates Requested / Reviewed:        Health Maintenance Topics Overdue:    Health Maintenance Due   Topic Date Due    COVID-19 Vaccine (3 - 2023-24 season) 09/01/2023         Health Maintenance Topic(s) Outreach Outcomes & Actions Taken:    Colorectal Cancer Screening - Outreach Outcomes & Actions Taken  : [employer] was given to patient on 2/7/2024 10:02 AM

## 2024-04-03 ENCOUNTER — OFFICE VISIT (OUTPATIENT)
Dept: ORTHOPEDICS | Facility: CLINIC | Age: 49
End: 2024-04-03
Payer: COMMERCIAL

## 2024-04-03 ENCOUNTER — HOSPITAL ENCOUNTER (OUTPATIENT)
Dept: RADIOLOGY | Facility: HOSPITAL | Age: 49
Discharge: HOME | End: 2024-04-03
Attending: ORTHOPAEDIC SURGERY
Payer: COMMERCIAL

## 2024-04-03 DIAGNOSIS — M25.521 RIGHT ELBOW PAIN: ICD-10-CM

## 2024-04-03 DIAGNOSIS — M25.521 RIGHT ELBOW PAIN: Primary | ICD-10-CM

## 2024-04-03 PROCEDURE — 73080 X-RAY EXAM OF ELBOW: CPT | Mod: TC,PO,RT

## 2024-04-03 PROCEDURE — 99999 PR PBB SHADOW E&M-EST. PATIENT-LVL III: CPT | Mod: PBBFAC,,, | Performed by: ORTHOPAEDIC SURGERY

## 2024-04-03 PROCEDURE — 99213 OFFICE O/P EST LOW 20 MIN: CPT | Mod: S$GLB,,, | Performed by: ORTHOPAEDIC SURGERY

## 2024-04-03 PROCEDURE — 73080 X-RAY EXAM OF ELBOW: CPT | Mod: 26,RT,, | Performed by: RADIOLOGY

## 2024-04-03 PROCEDURE — 1159F MED LIST DOCD IN RCRD: CPT | Mod: CPTII,S$GLB,, | Performed by: ORTHOPAEDIC SURGERY

## 2024-04-03 RX ORDER — CELECOXIB 100 MG/1
100 CAPSULE ORAL 2 TIMES DAILY
Qty: 42 CAPSULE | Refills: 0 | Status: SHIPPED | OUTPATIENT
Start: 2024-04-03 | End: 2024-04-24

## 2024-04-03 NOTE — PROGRESS NOTES
4/3/2024    Chief Complaint:  Chief Complaint   Patient presents with    Right Upper Arm - Post-op Evaluation, Pain       HPI:  Khadar Lorenzo is a 48 y.o. male, who presents to clinic today has a history of right distal biceps tendon repair.  He states that several weeks ago he had a straining type injury to the side.  Since then he has had some soreness and pain to the elbow.  States that activities which caused supination do reproduce his pain.  He is here today for evaluation and treatment.    PMHX:  Past Medical History:   Diagnosis Date    Anxiety     Depression     GERD (gastroesophageal reflux disease)     Hyperlipidemia     Hypotestosteronemia     Hypothyroidism     Nephrolithiasis     2 episodes    Obesity     Snoring     Syncope and collapse        PSHX:  Past Surgical History:   Procedure Laterality Date    CYST REMOVAL      left wrist    EXCISION, SUPERFICIAL MASS, HEAD N/A 10/18/2023    Procedure: EXCISION, SUPERFICIAL MASS, HEAD;  Surgeon: Fred Calderón MD;  Location: [facility] OR;  Service: General;  Laterality: N/A;  posterior scalp    FRACTURE SURGERY      screw in right thumb    testopel      by Dr. Martini in the past for his hypogonadism.    TONSILLECTOMY, ADENOIDECTOMY, BILATERAL MYRINGOTOMY AND TUBES         FMHX:  Family History   Problem Relation Age of Onset    Cancer Father     Hypertension Father     Hyperlipidemia Father     Skin cancer Father     Drug abuse Maternal Uncle     Arthritis Maternal Grandmother     Mental illness Maternal Grandfather     Alcohol abuse Paternal Grandfather     Melanoma Neg Hx     Psoriasis Neg Hx     Lupus Neg Hx     Eczema Neg Hx        SOCHX:  Social History     Tobacco Use    Smoking status: Former    Smokeless tobacco: Current     Types: Chew    Tobacco comments:     chew everyday, 0.25 can    Substance Use Topics    Alcohol use: Yes     Alcohol/week: 1.0 standard drink of alcohol     Types: 1 Cans of beer per week     Comment: socially        ALLERGIES:  Patient has no known allergies.    CURRENT MEDICATIONS:  Current Outpatient Medications on File Prior to Visit   Medication Sig Dispense Refill    cyclobenzaprine (FLEXERIL) 10 MG tablet Take 10 mg by mouth 3 (three) times daily as needed for Muscle spasms.      fenofibrate 160 MG Tab Take 1 tablet (160 mg total) by mouth once daily. 90 tablet 3    HYDROcodone-acetaminophen (NORCO) 7.5-325 mg per tablet Take 1 tablet by mouth every 6 (six) hours as needed for Pain. 12 tablet 0    levothyroxine (SYNTHROID) 75 MCG tablet TAKE 1 TABLET BEFORE BREAKFAST 90 tablet 3    OLUX-E 0.05 % topical foam Apply 0.05 % topically continuous prn.      omeprazole (PRILOSEC) 40 MG capsule TAKE 1 CAPSULE DAILY 90 capsule 3    testosterone cypionate (DEPOTESTOTERONE CYPIONATE) 200 mg/mL injection Inject 1 mL (200 mg total) into the muscle every 10 days. 10 mL 2    [DISCONTINUED] meloxicam (MOBIC) 15 MG tablet Take 1 tablet (15 mg total) by mouth daily as needed for Pain. 30 tablet 2     No current facility-administered medications on file prior to visit.       REVIEW OF SYSTEMS:  Review of Systems   Musculoskeletal:  Positive for joint pain.   All other systems reviewed and are negative.    GENERAL PHYSICAL EXAM:   There were no vitals taken for this visit.   GEN: well developed, well nourished, no acute distress   HENT: Normocephalic, atraumatic   EYES: No discharge, conjunctiva normal   NECK: Supple, non-tender   PULM: No wheezing, no respiratory distress   CV: RRR   ABD: Soft, non-tender    ORTHO EXAM:   Examination of the right upper extremity reveals that the incision site is well healed.  There is no edema.  Palpation produces mild tenderness over the insertion site of the biceps tendon.  The tendon appears to be intact.  He does have a intact tendon on hook testing.  Range of motion of the elbow is 0-150 degrees.  He is able to supinate to 80° and pronate to 70°.  Forced supination does produce mild tenderness  over the insertion site of the biceps.  Distally he does have sensation intact in the median radial ulnar distribution and capillary refill is less than 2 seconds    RADIOLOGY:   X-rays of the right elbow were taken in clinic today there is evidence of the previous biceps tendon repair.  The button appears to be well-positioned.  There was a small amount of heterotopic ossification about the insertion site.    ASSESSMENT:   Right distal biceps tendon strain    PLAN:  1. I will start him on Celebrex 100 mg twice daily    2.  He will continue limited activity with the arm    3. Will follow up in 3-4 weeks.  If he has not had significant improvement in his symptoms I will send him for an MRI

## 2024-04-09 ENCOUNTER — OFFICE VISIT (OUTPATIENT)
Dept: FAMILY MEDICINE | Facility: CLINIC | Age: 49
End: 2024-04-09
Payer: COMMERCIAL

## 2024-04-09 VITALS
HEART RATE: 86 BPM | SYSTOLIC BLOOD PRESSURE: 122 MMHG | BODY MASS INDEX: 30.6 KG/M2 | HEIGHT: 71 IN | TEMPERATURE: 98 F | DIASTOLIC BLOOD PRESSURE: 86 MMHG | OXYGEN SATURATION: 97 % | WEIGHT: 218.56 LBS

## 2024-04-09 DIAGNOSIS — E78.1 HYPERTRIGLYCERIDEMIA: ICD-10-CM

## 2024-04-09 DIAGNOSIS — K21.9 GASTROESOPHAGEAL REFLUX DISEASE WITHOUT ESOPHAGITIS: ICD-10-CM

## 2024-04-09 DIAGNOSIS — S46.211A STRAIN OF RIGHT BICEPS, INITIAL ENCOUNTER: Primary | ICD-10-CM

## 2024-04-09 DIAGNOSIS — E34.9 HYPOTESTOSTERONEMIA: ICD-10-CM

## 2024-04-09 DIAGNOSIS — E03.8 SECONDARY HYPOTHYROIDISM: ICD-10-CM

## 2024-04-09 DIAGNOSIS — E66.9 CLASS 1 OBESITY WITHOUT SERIOUS COMORBIDITY WITH BODY MASS INDEX (BMI) OF 30.0 TO 30.9 IN ADULT, UNSPECIFIED OBESITY TYPE: ICD-10-CM

## 2024-04-09 PROCEDURE — 1160F RVW MEDS BY RX/DR IN RCRD: CPT | Mod: CPTII,S$GLB,, | Performed by: NURSE PRACTITIONER

## 2024-04-09 PROCEDURE — 1159F MED LIST DOCD IN RCRD: CPT | Mod: CPTII,S$GLB,, | Performed by: NURSE PRACTITIONER

## 2024-04-09 PROCEDURE — 3074F SYST BP LT 130 MM HG: CPT | Mod: CPTII,S$GLB,, | Performed by: NURSE PRACTITIONER

## 2024-04-09 PROCEDURE — 99214 OFFICE O/P EST MOD 30 MIN: CPT | Mod: S$GLB,,, | Performed by: NURSE PRACTITIONER

## 2024-04-09 PROCEDURE — 3079F DIAST BP 80-89 MM HG: CPT | Mod: CPTII,S$GLB,, | Performed by: NURSE PRACTITIONER

## 2024-04-09 PROCEDURE — 3008F BODY MASS INDEX DOCD: CPT | Mod: CPTII,S$GLB,, | Performed by: NURSE PRACTITIONER

## 2024-04-09 RX ORDER — FENOFIBRATE 150 MG/1
CAPSULE ORAL
COMMUNITY

## 2024-04-09 RX ORDER — TADALAFIL 20 MG/1
TABLET ORAL
COMMUNITY

## 2024-04-09 RX ORDER — MELOXICAM 15 MG/1
TABLET ORAL
COMMUNITY
End: 2024-04-09

## 2024-04-09 RX ORDER — ONDANSETRON 8 MG/1
TABLET, ORALLY DISINTEGRATING ORAL
COMMUNITY

## 2024-04-09 RX ORDER — TADALAFIL 5 MG/1
TABLET ORAL
COMMUNITY

## 2024-04-09 RX ORDER — IBUPROFEN 200 MG
TABLET ORAL
COMMUNITY

## 2024-04-09 NOTE — PROGRESS NOTES
Subjective:       Patient ID: Khadar Lorenzo is a 48 y.o. male.    Chief Complaint: Follow-up    Follow-up  Pertinent negatives include no abdominal pain, arthralgias, chest pain, coughing, fatigue, fever, headaches, nausea or vomiting.     Doing well after lipoma removed. Developed seroma afterwards that had to be drained. Did not have any other issues after that was done.    He has history of traumatic injury to right bicep. Had to have repair. States it has started bothering him again. Feels a pulling discomfort. He had xray and saw Dr. Antoine.  He did not mess up the surgery. States it is getting better.     He is requesting to be scheduled with Urology in [place] for his Testosterone replacement.     See ROS    The following portion of the patients history was reviewed and updated as appropriate: allergies, current medications, past medical and surgical history. Past social history and problem list reviewed. Family PMH and Past social history reviewed. Tobacco, Illicit drug use reviewed.      Review of patient's allergies indicates:  No Known Allergies      Current Outpatient Medications:     bupropion HCl (WELLBUTRIN ORAL), once daily., Disp: , Rfl:     celecoxib (CELEBREX) 100 MG capsule, Take 1 capsule (100 mg total) by mouth 2 (two) times daily. for 21 days, Disp: 42 capsule, Rfl: 0    cyclobenzaprine (FLEXERIL) 10 MG tablet, Take 10 mg by mouth 3 (three) times daily as needed for Muscle spasms., Disp: , Rfl:     fenofibrate 150 mg Cap, Orally Once a day, Disp: , Rfl:     fenofibrate 160 MG Tab, Take 1 tablet (160 mg total) by mouth once daily., Disp: 90 tablet, Rfl: 3    HYDROcodone-acetaminophen (NORCO) 7.5-325 mg per tablet, Take 1 tablet by mouth every 6 (six) hours as needed for Pain., Disp: 12 tablet, Rfl: 0    ibuprofen 50 mg/1.25 mL DrpS, ibuprofen, Disp: , Rfl:     levothyroxine (SYNTHROID) 75 MCG tablet, TAKE 1 TABLET BEFORE BREAKFAST, Disp: 90 tablet, Rfl: 3    meloxicam (MOBIC) 15  MG tablet, , Disp: , Rfl:     OLUX-E 0.05 % topical foam, Apply 0.05 % topically continuous prn., Disp: , Rfl:     omeprazole (PRILOSEC) 40 MG capsule, TAKE 1 CAPSULE DAILY, Disp: 90 capsule, Rfl: 3    ondansetron (ZOFRAN-ODT) 8 MG TbDL, , Disp: , Rfl:     tadalafiL (CIALIS) 20 MG Tab, , Disp: , Rfl:     tadalafiL (CIALIS) 5 MG tablet, , Disp: , Rfl:     testosterone cypionate (DEPOTESTOTERONE CYPIONATE) 200 mg/mL injection, Inject 1 mL (200 mg total) into the muscle every 10 days., Disp: 10 mL, Rfl: 2    Past Medical History:   Diagnosis Date    Anxiety     Depression     GERD (gastroesophageal reflux disease)     Hyperlipidemia     Hypotestosteronemia     Hypothyroidism     Nephrolithiasis     2 episodes    Obesity     Snoring     Syncope and collapse        Past Surgical History:   Procedure Laterality Date    CYST REMOVAL      left wrist    EXCISION, SUPERFICIAL MASS, HEAD N/A 10/18/2023    Procedure: EXCISION, SUPERFICIAL MASS, HEAD;  Surgeon: Fred Calderón MD;  Location: [facility];  Service: General;  Laterality: N/A;  posterior scalp    FRACTURE SURGERY      screw in right thumb    testopel      by Dr. Martini in the past for his hypogonadism.    TONSILLECTOMY, ADENOIDECTOMY, BILATERAL MYRINGOTOMY AND TUBES         Social History     Socioeconomic History    Marital status:    Occupational History    Occupation: [employer]   Tobacco Use    Smoking status: Former    Smokeless tobacco: Current     Types: Chew    Tobacco comments:     chew everyday, 0.25 can    Substance and Sexual Activity    Alcohol use: Yes     Alcohol/week: 1.0 standard drink of alcohol     Types: 1 Cans of beer per week     Comment: socially    Drug use: No    Sexual activity: Yes     Partners: Female     Social Determinants of Health     Financial Resource Strain: Low Risk  (4/3/2024)    Overall Financial Resource Strain (CARDIA)     Difficulty of Paying Living Expenses: Not hard at all   Food Insecurity: No Food  "Insecurity (4/3/2024)    Hunger Vital Sign     Worried About Running Out of Food in the Last Year: Never true     Ran Out of Food in the Last Year: Never true   Transportation Needs: No Transportation Needs (4/3/2024)    PRAPARE - Transportation     Lack of Transportation (Medical): No     Lack of Transportation (Non-Medical): No   Physical Activity: Unknown (4/3/2024)    Exercise Vital Sign     Days of Exercise per Week: Patient declined   Social Connections: Unknown (4/3/2024)    Social Connection and Isolation Panel [NHANES]     Active Member of Clubs or Organizations: Patient declined   Housing Stability: Unknown (4/3/2024)    Housing Stability Vital Sign     Unable to Pay for Housing in the Last Year: No     Unstable Housing in the Last Year: No     Review of Systems   Constitutional:  Negative for fatigue and fever.   HENT: Negative.     Eyes:  Negative for visual disturbance.   Respiratory:  Negative for cough, chest tightness, shortness of breath and wheezing.    Cardiovascular:  Negative for chest pain, palpitations and leg swelling.   Gastrointestinal:  Negative for abdominal pain, blood in stool, diarrhea, nausea and vomiting.   Genitourinary: Negative.    Musculoskeletal:  Negative for arthralgias, back pain and gait problem.        Strained right bicep at work but saw Orthopedics and xray was good. Was concerned he messed up the surgery   Skin: Negative.    Neurological:  Negative for headaches.   Psychiatric/Behavioral:  Negative for dysphoric mood and sleep disturbance. The patient is not nervous/anxious.        Objective:      /86 (BP Location: Left arm, Patient Position: Sitting, BP Method: Medium (Manual))   Pulse 86   Temp 98.1 °F (36.7 °C)   Ht 5' 11" (1.803 m)   Wt 99.2 kg (218 lb 9.4 oz)   SpO2 97%   BMI 30.49 kg/m²      Physical Exam  Constitutional:       Appearance: Normal appearance. He is obese.   HENT:      Head: Normocephalic.   Eyes:      Pupils: Pupils are equal, round, and " reactive to light.   Neck:      Thyroid: No thyromegaly.      Vascular: No carotid bruit.   Cardiovascular:      Rate and Rhythm: Normal rate and regular rhythm.      Pulses: Normal pulses.      Heart sounds: Normal heart sounds. No murmur heard.  Pulmonary:      Effort: Pulmonary effort is normal.      Breath sounds: Normal breath sounds. No wheezing.   Abdominal:      General: Bowel sounds are normal.      Tenderness: There is no abdominal tenderness.   Musculoskeletal:         General: Normal range of motion.      Cervical back: Normal range of motion.      Right lower leg: No edema.      Left lower leg: No edema.      Comments: Gait normal.  strong, equal   Skin:     General: Skin is warm and dry.      Capillary Refill: Capillary refill takes less than 2 seconds.   Neurological:      General: No focal deficit present.      Mental Status: He is alert.   Psychiatric:         Attention and Perception: Attention and perception normal.         Mood and Affect: Mood and affect normal.         Speech: Speech normal.         Behavior: Behavior normal.         Assessment:       1. Strain of right biceps, initial encounter    2. Hyperprolactinemia    3. Hypotestosteronemia    4. Class 1 obesity without serious comorbidity with body mass index (BMI) of 30.0 to 30.9 in adult, unspecified obesity type    5. Secondary hypothyroidism    6. Gastroesophageal reflux disease without esophagitis    7. Hypertriglyceridemia        Plan:       Strain of right biceps, initial encounter: has seen Dr. Antoine. Avoiding over use/strain of the right bicep    Hypotestosteronemia: follow up with Urology. He wants to move his care  to Urology in [place]    Class 1 obesity without serious comorbidity with body mass index (BMI) of 30.0 to 30.9 in adult, unspecified obesity type: Weight loss encouraged. Follow low fat, low carb diet. Portion control, exercise.     Secondary hypothyroidism: stable with current dose of  Synthroid    Gastroesophageal reflux disease without esophagitis: stable on current medication    Hypertriglyceridemia: tolerating Fenofibrate. Last labs showed triglycerides at 132, down from 198.        Continue current medication  Take medications only as prescribed  Healthy diet, exercise  Adequate rest  Adequate hydration  Avoid allergens  Avoid excessive caffeine     Follow up 6 months

## 2024-06-21 ENCOUNTER — LAB VISIT (OUTPATIENT)
Dept: LAB | Facility: HOSPITAL | Age: 49
End: 2024-06-21
Attending: NURSE PRACTITIONER
Payer: COMMERCIAL

## 2024-06-21 DIAGNOSIS — N52.9 ERECTILE DYSFUNCTION, UNSPECIFIED ERECTILE DYSFUNCTION TYPE: ICD-10-CM

## 2024-06-21 DIAGNOSIS — E29.1 MALE HYPOGONADISM: ICD-10-CM

## 2024-06-21 LAB
ERYTHROCYTE [DISTWIDTH] IN BLOOD BY AUTOMATED COUNT: 13.1 % (ref 11.5–14.5)
HCT VFR BLD AUTO: 53 % (ref 40–54)
HGB BLD-MCNC: 16.8 G/DL (ref 14–18)
MCH RBC QN AUTO: 27.3 PG (ref 27–31)
MCHC RBC AUTO-ENTMCNC: 31.7 G/DL (ref 32–36)
MCV RBC AUTO: 86 FL (ref 82–98)
PLATELET # BLD AUTO: 214 K/UL (ref 150–450)
PMV BLD AUTO: 10.2 FL (ref 9.2–12.9)
RBC # BLD AUTO: 6.16 M/UL (ref 4.6–6.2)
TESTOST SERPL-MCNC: 909 NG/DL (ref 304–1227)
WBC # BLD AUTO: 7.93 K/UL (ref 3.9–12.7)

## 2024-06-21 PROCEDURE — 84403 ASSAY OF TOTAL TESTOSTERONE: CPT | Performed by: NURSE PRACTITIONER

## 2024-06-21 PROCEDURE — 85027 COMPLETE CBC AUTOMATED: CPT | Performed by: NURSE PRACTITIONER

## 2024-06-21 PROCEDURE — 36415 COLL VENOUS BLD VENIPUNCTURE: CPT | Mod: PO | Performed by: NURSE PRACTITIONER

## 2024-06-24 ENCOUNTER — OFFICE VISIT (OUTPATIENT)
Dept: UROLOGY | Facility: CLINIC | Age: 49
End: 2024-06-24
Payer: COMMERCIAL

## 2024-06-24 VITALS — HEIGHT: 71 IN | WEIGHT: 215.81 LBS | BODY MASS INDEX: 30.21 KG/M2

## 2024-06-24 DIAGNOSIS — E34.9 TESTOSTERONE DEFICIENCY: Primary | ICD-10-CM

## 2024-06-24 DIAGNOSIS — E29.1 MALE HYPOGONADISM: ICD-10-CM

## 2024-06-24 LAB
BILIRUBIN, UA POC OHS: NEGATIVE
BLOOD, UA POC OHS: NEGATIVE
CLARITY, UA POC OHS: CLEAR
COLOR, UA POC OHS: YELLOW
GLUCOSE, UA POC OHS: NEGATIVE
KETONES, UA POC OHS: NEGATIVE
LEUKOCYTES, UA POC OHS: NEGATIVE
NITRITE, UA POC OHS: NEGATIVE
PH, UA POC OHS: 5.5
PROTEIN, UA POC OHS: NEGATIVE
SPECIFIC GRAVITY, UA POC OHS: >=1.03
UROBILINOGEN, UA POC OHS: 0.2

## 2024-06-24 PROCEDURE — 99214 OFFICE O/P EST MOD 30 MIN: CPT | Mod: S$GLB,,, | Performed by: STUDENT IN AN ORGANIZED HEALTH CARE EDUCATION/TRAINING PROGRAM

## 2024-06-24 PROCEDURE — 99999 PR PBB SHADOW E&M-EST. PATIENT-LVL III: CPT | Mod: PBBFAC,,, | Performed by: STUDENT IN AN ORGANIZED HEALTH CARE EDUCATION/TRAINING PROGRAM

## 2024-06-24 PROCEDURE — 1159F MED LIST DOCD IN RCRD: CPT | Mod: CPTII,S$GLB,, | Performed by: STUDENT IN AN ORGANIZED HEALTH CARE EDUCATION/TRAINING PROGRAM

## 2024-06-24 PROCEDURE — 81003 URINALYSIS AUTO W/O SCOPE: CPT | Mod: QW,S$GLB,, | Performed by: STUDENT IN AN ORGANIZED HEALTH CARE EDUCATION/TRAINING PROGRAM

## 2024-06-24 PROCEDURE — 3008F BODY MASS INDEX DOCD: CPT | Mod: CPTII,S$GLB,, | Performed by: STUDENT IN AN ORGANIZED HEALTH CARE EDUCATION/TRAINING PROGRAM

## 2024-06-24 RX ORDER — TESTOSTERONE CYPIONATE 200 MG/ML
200 INJECTION, SOLUTION INTRAMUSCULAR
Qty: 10 ML | Refills: 2 | Status: SHIPPED | OUTPATIENT
Start: 2024-06-24 | End: 2024-06-24

## 2024-06-24 RX ORDER — TESTOSTERONE CYPIONATE 200 MG/ML
200 INJECTION, SOLUTION INTRAMUSCULAR
Qty: 10 ML | Refills: 2 | Status: SHIPPED | OUTPATIENT
Start: 2024-06-24

## 2024-06-24 NOTE — PROGRESS NOTES
"[facility]   Clinic Note    Subjective:     Chief Complaint: male hypogonadism      History of Present Illness:  Khadar Lorenzo is a 48 y.o. male who presents to clinic for evaluation and management of low T. He is new to our clinic.    He has improved energy, mood, and libido on therapy. He has been on TRT 10-15 years. He is on 200mg/1mL q10 days. He has no ED. He denies LUTS.     He has no known family history of prostate cancer      Testosterone, Total   Latest Ref Rng 304 - 1227 ng/dL   8/29/2022 272 (L)    9/26/2022 291 (L)    12/20/2022 >1500 (H)    3/7/2023 776    12/15/2023 426    6/21/2024 909       Past medical, family, surgical and social history reviewed as documented in chart with pertinent positive medical, family, surgical and social history detailed in HPI.    A review systems was conducted with pertinent positive and negative findings documented in HPI.    Objective:     Estimated body mass index is 30.1 kg/m² as calculated from the following:    Height as of this encounter: 5' 11" (1.803 m).    Weight as of this encounter: 97.9 kg (215 lb 13.3 oz).    Vital Signs (Most Recent)       Physical Exam  Constitutional:       General: He is not in acute distress.     Appearance: He is not ill-appearing or toxic-appearing.   Pulmonary:      Effort: Pulmonary effort is normal. No accessory muscle usage or respiratory distress.   Neurological:      Mental Status: He is alert.         Labs reviewed below:  Lab Results   Component Value Date    BUN 11 10/12/2023    CREATININE 0.9 10/12/2023    WBC 7.93 06/21/2024    HGB 16.8 06/21/2024    HCT 53.0 06/21/2024     06/21/2024    AST 29 10/12/2023    ALT 47 (H) 10/12/2023    ALKPHOS 52 (L) 10/12/2023    ALBUMIN 4.2 10/12/2023    HGBA1C 5.5 04/11/2023      PSA trend reviewed below:  Lab Results   Component Value Date    PSA 1.6 10/12/2023    PSA 1.9 06/06/2022    PSA 1.5 10/16/2020    PSA 1.3 10/22/2019    PSA 1.7 03/11/2019    PSA 1.6 " 10/27/2017    PSA 0.81 03/08/2016    PSA 1.0 12/20/2013    PSADIAG 1.9 08/29/2022    PSADIAG 1.4 05/10/2021    PSADIAG 1.5 02/08/2021    PSADIAG 1.6 03/19/2014      Urine dipstick today was negative for blood, leukocytes, and nitrites.     Assessment:     1. Testosterone deficiency    2. Male hypogonadism      Plan:     1. Male hypogonadism  2. Testosterone deficiency    We discussed the diagnosis and etiologies of testosterone deficiency or low testosterone.  We discussed the workup and potential treatment options    Regarding testosterone therapy we discussed the risks and benefits of therapy including frequent intensive monitoring of testosterone and hematocrit levels. We discussed options.    Continue current dosing  Follow up 6 months with Zara with a T and CBC    - testosterone cypionate (DEPOTESTOTERONE CYPIONATE) 200 mg/mL injection; Inject 1 mL (200 mg total) into the muscle every 10 days.  Dispense: 10 mL; Refill: 2  - CBC Auto Differential; Future  - Testosterone; Future    Tyshawn Bedolla MD

## 2024-08-27 ENCOUNTER — PATIENT MESSAGE (OUTPATIENT)
Dept: UROLOGY | Facility: CLINIC | Age: 49
End: 2024-08-27
Payer: COMMERCIAL

## 2024-08-27 RX ORDER — TADALAFIL 5 MG/1
5 TABLET ORAL DAILY PRN
Qty: 30 TABLET | Refills: 11 | Status: SHIPPED | OUTPATIENT
Start: 2024-08-27

## 2024-10-20 DIAGNOSIS — E03.4 HYPOTHYROIDISM DUE TO ACQUIRED ATROPHY OF THYROID: ICD-10-CM

## 2024-10-20 DIAGNOSIS — Z79.899 ENCOUNTER FOR LONG-TERM (CURRENT) USE OF MEDICATIONS: Primary | ICD-10-CM

## 2024-10-21 RX ORDER — LEVOTHYROXINE SODIUM 75 UG/1
TABLET ORAL
Qty: 90 TABLET | Refills: 0 | Status: SHIPPED | OUTPATIENT
Start: 2024-10-21

## 2024-10-21 RX ORDER — OMEPRAZOLE 40 MG/1
CAPSULE, DELAYED RELEASE ORAL
Qty: 90 CAPSULE | Refills: 0 | Status: SHIPPED | OUTPATIENT
Start: 2024-10-21

## 2024-10-21 NOTE — TELEPHONE ENCOUNTER
He is due for annual exam. He needs labs done. Last labs were April 2023.  Has to get TSH level to continue getting thyroid medication. Orders placed. One refill given to hold him until he can do labs and come in

## 2024-10-22 ENCOUNTER — OFFICE VISIT (OUTPATIENT)
Dept: FAMILY MEDICINE | Facility: CLINIC | Age: 49
End: 2024-10-22
Payer: COMMERCIAL

## 2024-10-22 VITALS
HEIGHT: 71 IN | DIASTOLIC BLOOD PRESSURE: 80 MMHG | WEIGHT: 214.31 LBS | SYSTOLIC BLOOD PRESSURE: 110 MMHG | BODY MASS INDEX: 30 KG/M2 | TEMPERATURE: 98 F | OXYGEN SATURATION: 97 % | HEART RATE: 64 BPM

## 2024-10-22 DIAGNOSIS — E03.8 SECONDARY HYPOTHYROIDISM: ICD-10-CM

## 2024-10-22 DIAGNOSIS — Z12.5 PROSTATE CANCER SCREENING: ICD-10-CM

## 2024-10-22 DIAGNOSIS — Z72.0 SMOKELESS TOBACCO USE: ICD-10-CM

## 2024-10-22 DIAGNOSIS — F41.9 ANXIETY: ICD-10-CM

## 2024-10-22 DIAGNOSIS — Z79.899 ENCOUNTER FOR LONG-TERM (CURRENT) USE OF MEDICATIONS: ICD-10-CM

## 2024-10-22 DIAGNOSIS — Z12.11 COLON CANCER SCREENING: ICD-10-CM

## 2024-10-22 DIAGNOSIS — Z00.00 ANNUAL PHYSICAL EXAM: Primary | ICD-10-CM

## 2024-10-22 DIAGNOSIS — E34.9 TESTOSTERONE DEFICIENCY: ICD-10-CM

## 2024-10-22 DIAGNOSIS — Z23 IMMUNIZATION DUE: ICD-10-CM

## 2024-10-22 DIAGNOSIS — E22.1 HYPERPROLACTINEMIA: ICD-10-CM

## 2024-10-22 DIAGNOSIS — E78.1 HYPERTRIGLYCERIDEMIA: ICD-10-CM

## 2024-10-22 DIAGNOSIS — K21.9 GASTROESOPHAGEAL REFLUX DISEASE WITHOUT ESOPHAGITIS: ICD-10-CM

## 2024-10-22 PROBLEM — E66.811 CLASS 1 OBESITY WITHOUT SERIOUS COMORBIDITY WITH BODY MASS INDEX (BMI) OF 30.0 TO 30.9 IN ADULT: Status: RESOLVED | Noted: 2023-04-18 | Resolved: 2024-10-22

## 2024-10-22 LAB
ALBUMIN SERPL BCP-MCNC: 4.2 G/DL (ref 3.5–5.2)
ALP SERPL-CCNC: 52 U/L (ref 40–150)
ALT SERPL W/O P-5'-P-CCNC: 55 U/L (ref 10–44)
ANION GAP SERPL CALC-SCNC: 8 MMOL/L (ref 8–16)
AST SERPL-CCNC: 36 U/L (ref 10–40)
BASOPHILS # BLD AUTO: 0.06 K/UL (ref 0–0.2)
BASOPHILS NFR BLD: 1 % (ref 0–1.9)
BILIRUB SERPL-MCNC: 0.7 MG/DL (ref 0.1–1)
BUN SERPL-MCNC: 19 MG/DL (ref 6–20)
CALCIUM SERPL-MCNC: 9.2 MG/DL (ref 8.7–10.5)
CHLORIDE SERPL-SCNC: 107 MMOL/L (ref 95–110)
CHOLEST SERPL-MCNC: 179 MG/DL (ref 120–199)
CHOLEST/HDLC SERPL: 4.8 {RATIO} (ref 2–5)
CO2 SERPL-SCNC: 24 MMOL/L (ref 23–29)
COMPLEXED PSA SERPL-MCNC: 1.7 NG/ML (ref 0–4)
CREAT SERPL-MCNC: 1 MG/DL (ref 0.5–1.4)
DIFFERENTIAL METHOD BLD: ABNORMAL
EOSINOPHIL # BLD AUTO: 0.1 K/UL (ref 0–0.5)
EOSINOPHIL NFR BLD: 1.7 % (ref 0–8)
ERYTHROCYTE [DISTWIDTH] IN BLOOD BY AUTOMATED COUNT: 13 % (ref 11.5–14.5)
EST. GFR  (NO RACE VARIABLE): >60 ML/MIN/1.73 M^2
ESTIMATED AVG GLUCOSE: 108 MG/DL (ref 68–131)
GLUCOSE SERPL-MCNC: 109 MG/DL (ref 70–110)
HBA1C MFR BLD: 5.4 % (ref 4–5.6)
HCT VFR BLD AUTO: 50.6 % (ref 40–54)
HDLC SERPL-MCNC: 37 MG/DL (ref 40–75)
HDLC SERPL: 20.7 % (ref 20–50)
HGB BLD-MCNC: 16 G/DL (ref 14–18)
IMM GRANULOCYTES # BLD AUTO: 0.02 K/UL (ref 0–0.04)
IMM GRANULOCYTES NFR BLD AUTO: 0.3 % (ref 0–0.5)
LDLC SERPL CALC-MCNC: 97.8 MG/DL (ref 63–159)
LYMPHOCYTES # BLD AUTO: 2 K/UL (ref 1–4.8)
LYMPHOCYTES NFR BLD: 33.4 % (ref 18–48)
MCH RBC QN AUTO: 27.9 PG (ref 27–31)
MCHC RBC AUTO-ENTMCNC: 31.6 G/DL (ref 32–36)
MCV RBC AUTO: 88 FL (ref 82–98)
MONOCYTES # BLD AUTO: 0.5 K/UL (ref 0.3–1)
MONOCYTES NFR BLD: 8.1 % (ref 4–15)
NEUTROPHILS # BLD AUTO: 3.3 K/UL (ref 1.8–7.7)
NEUTROPHILS NFR BLD: 55.5 % (ref 38–73)
NONHDLC SERPL-MCNC: 142 MG/DL
NRBC BLD-RTO: 0 /100 WBC
PLATELET # BLD AUTO: 209 K/UL (ref 150–450)
PMV BLD AUTO: 10.4 FL (ref 9.2–12.9)
POTASSIUM SERPL-SCNC: 4.1 MMOL/L (ref 3.5–5.1)
PROT SERPL-MCNC: 6.9 G/DL (ref 6–8.4)
RBC # BLD AUTO: 5.73 M/UL (ref 4.6–6.2)
SODIUM SERPL-SCNC: 139 MMOL/L (ref 136–145)
TRIGL SERPL-MCNC: 221 MG/DL (ref 30–150)
TSH SERPL DL<=0.005 MIU/L-ACNC: 1.13 UIU/ML (ref 0.4–4)
WBC # BLD AUTO: 5.96 K/UL (ref 3.9–12.7)

## 2024-10-22 PROCEDURE — 3044F HG A1C LEVEL LT 7.0%: CPT | Mod: CPTII,S$GLB,, | Performed by: NURSE PRACTITIONER

## 2024-10-22 PROCEDURE — 3079F DIAST BP 80-89 MM HG: CPT | Mod: CPTII,S$GLB,, | Performed by: NURSE PRACTITIONER

## 2024-10-22 PROCEDURE — 90472 IMMUNIZATION ADMIN EACH ADD: CPT | Mod: S$GLB,,, | Performed by: NURSE PRACTITIONER

## 2024-10-22 PROCEDURE — 1160F RVW MEDS BY RX/DR IN RCRD: CPT | Mod: CPTII,S$GLB,, | Performed by: NURSE PRACTITIONER

## 2024-10-22 PROCEDURE — 99396 PREV VISIT EST AGE 40-64: CPT | Mod: 25,S$GLB,, | Performed by: NURSE PRACTITIONER

## 2024-10-22 PROCEDURE — 3008F BODY MASS INDEX DOCD: CPT | Mod: CPTII,S$GLB,, | Performed by: NURSE PRACTITIONER

## 2024-10-22 PROCEDURE — 80061 LIPID PANEL: CPT | Performed by: NURSE PRACTITIONER

## 2024-10-22 PROCEDURE — 83036 HEMOGLOBIN GLYCOSYLATED A1C: CPT | Performed by: NURSE PRACTITIONER

## 2024-10-22 PROCEDURE — 90656 IIV3 VACC NO PRSV 0.5 ML IM: CPT | Mod: S$GLB,,, | Performed by: NURSE PRACTITIONER

## 2024-10-22 PROCEDURE — 84153 ASSAY OF PSA TOTAL: CPT | Performed by: NURSE PRACTITIONER

## 2024-10-22 PROCEDURE — 80053 COMPREHEN METABOLIC PANEL: CPT | Performed by: NURSE PRACTITIONER

## 2024-10-22 PROCEDURE — 84443 ASSAY THYROID STIM HORMONE: CPT | Performed by: NURSE PRACTITIONER

## 2024-10-22 PROCEDURE — 90715 TDAP VACCINE 7 YRS/> IM: CPT | Mod: S$GLB,,, | Performed by: NURSE PRACTITIONER

## 2024-10-22 PROCEDURE — 1159F MED LIST DOCD IN RCRD: CPT | Mod: CPTII,S$GLB,, | Performed by: NURSE PRACTITIONER

## 2024-10-22 PROCEDURE — 85025 COMPLETE CBC W/AUTO DIFF WBC: CPT | Performed by: NURSE PRACTITIONER

## 2024-10-22 PROCEDURE — 3074F SYST BP LT 130 MM HG: CPT | Mod: CPTII,S$GLB,, | Performed by: NURSE PRACTITIONER

## 2024-10-22 PROCEDURE — 90471 IMMUNIZATION ADMIN: CPT | Mod: S$GLB,,, | Performed by: NURSE PRACTITIONER

## 2024-10-22 NOTE — PROGRESS NOTES
FitKit was given to patient on 10/22/2024 9:37 AM.    Venipuncture performed with 21 gauge butterfly, x's 1 attempt,  to L Antecubital vein.  Specimens collected per orders.      Pressure dressing applied to site, instructed patient to remove dressing in 10-15 minutes, OK to re-adjust dressing if pressure causing any discomfort, to observe closely for numbness and/or discoloration to hand or fingers, and to notify provider if bleeding persists after applying constant pressure lasting 30 minutes.

## 2024-10-23 ENCOUNTER — PATIENT MESSAGE (OUTPATIENT)
Dept: FAMILY MEDICINE | Facility: CLINIC | Age: 49
End: 2024-10-23
Payer: COMMERCIAL

## 2024-10-23 NOTE — PROGRESS NOTES
Subjective:    Patient ID: Khadar Lorenzo is a 48 y.o. male.    Chief Complaint: Follow-up      History of Present Illness    CHIEF COMPLAINT:  Khadar presents today for routine follow-up.    MEDICATIONS:  He takes fenofibrate (dosage uncertain), Synthroid 75, omeprazole, Cialis 5 mg daily at work (higher dose as needed at home, infrequently used), testosterone 200 every 10 days (with injection reminders), and Flexeril as needed.    VACCINATIONS:  He is due for tetanus vaccine (Tdap) and agrees to receive it during the current visit. He reports having recently received a flu vaccine.    GENERAL HEALTH:  He reports overall normal health status, denying chest pain, shortness of breath, nausea, vomiting, diarrhea, headaches, blood in stool, weakness in arms or legs, or abdominal pain. He acknowledges not drinking enough fluids but reports normal urination. He denies experiencing depression or anxiety, and reports good mood. He has never had a colonoscopy.    LIFESTYLE AND WEIGHT MANAGEMENT:  He continues tobacco use through dipping. His current weight is 214 lbs, down from 218 lbs. He previously reached 202-203 lbs when actively monitoring his diet. Exercise is limited to climbing stairs at work and occasional efforts to increase daily step count. He denies engaging in a regular workout routine.      ROS:  General: -fever, -chills, -fatigue, -weight gain, -weight loss  Eyes: -vision changes, -redness, -discharge  ENT: -ear pain, -nasal congestion, -sore throat  Cardiovascular: -chest pain, -palpitations, -lower extremity edema  Respiratory: -cough, -shortness of breath  Gastrointestinal: -abdominal pain, -nausea, -vomiting, -diarrhea, -constipation, -blood in stool  Genitourinary: -dysuria, -hematuria, -frequency  Musculoskeletal: -joint pain, -muscle pain, -muscle weakness  Skin: -rash, -lesion  Neurological: -headache, -dizziness, -numbness, -tingling  Psychiatric: -anxiety, -depression, -sleep difficulty          The patient's history was thoroughly reviewed and updated where necessary, including allergies, current medications, past medical and surgical history. The social history, problem list, family medical history, tobacco use, and illicit drug use were also reviewed and documented.    Review of patient's allergies indicates:  No Known Allergies    Current Outpatient Medications:     cyclobenzaprine (FLEXERIL) 10 MG tablet, Take 10 mg by mouth 3 (three) times daily as needed for Muscle spasms., Disp: , Rfl:     HYDROcodone-acetaminophen (NORCO) 7.5-325 mg per tablet, Take 1 tablet by mouth every 6 (six) hours as needed for Pain., Disp: 12 tablet, Rfl: 0    ibuprofen 50 mg/1.25 mL DrpS, ibuprofen, Disp: , Rfl:     levothyroxine (SYNTHROID) 75 MCG tablet, TAKE 1 TABLET BEFORE BREAKFAST, Disp: 90 tablet, Rfl: 0    OLUX-E 0.05 % topical foam, Apply 0.05 % topically continuous prn., Disp: , Rfl:     omeprazole (PRILOSEC) 40 MG capsule, TAKE 1 CAPSULE DAILY, Disp: 90 capsule, Rfl: 0    ondansetron (ZOFRAN-ODT) 8 MG TbDL, , Disp: , Rfl:     tadalafiL (CIALIS) 5 MG tablet, Take 1 tablet (5 mg total) by mouth daily as needed for Erectile Dysfunction., Disp: 30 tablet, Rfl: 11    testosterone cypionate (DEPOTESTOTERONE CYPIONATE) 200 mg/mL injection, Inject 1 mL (200 mg total) into the muscle every 10 days., Disp: 10 mL, Rfl: 2    fenofibrate 160 MG Tab, Take 1 tablet (160 mg total) by mouth once daily., Disp: 90 tablet, Rfl: 3  No current facility-administered medications for this visit.  Past Medical History:   Diagnosis Date    Anxiety     Depression     GERD (gastroesophageal reflux disease)     Hyperlipidemia     Hypotestosteronemia     Hypothyroidism     Nephrolithiasis     2 episodes    Obesity     Snoring     Syncope and collapse      Past Surgical History:   Procedure Laterality Date    CYST REMOVAL      left wrist    EXCISION, SUPERFICIAL MASS, HEAD N/A 10/18/2023    Procedure: EXCISION, SUPERFICIAL MASS, HEAD;   "Surgeon: Fred Calderón MD;  Location: [facility];  Service: General;  Laterality: N/A;  posterior scalp    FRACTURE SURGERY      screw in right thumb    testopel      by Dr. Martini in the past for his hypogonadism.    TONSILLECTOMY, ADENOIDECTOMY, BILATERAL MYRINGOTOMY AND TUBES       Social History     Socioeconomic History    Marital status:    Occupational History    Occupation: [employer]   Tobacco Use    Smoking status: Former    Smokeless tobacco: Current     Types: Chew    Tobacco comments:     chew everyday, 0.25 can    Substance and Sexual Activity    Alcohol use: Yes     Alcohol/week: 1.0 standard drink of alcohol     Types: 1 Cans of beer per week     Comment: socially    Drug use: No    Sexual activity: Yes     Partners: Female     Social Drivers of Health     Financial Resource Strain: Low Risk  (4/3/2024)    Overall Financial Resource Strain (CARDIA)     Difficulty of Paying Living Expenses: Not hard at all   Food Insecurity: No Food Insecurity (4/3/2024)    Hunger Vital Sign     Worried About Running Out of Food in the Last Year: Never true     Ran Out of Food in the Last Year: Never true   Transportation Needs: No Transportation Needs (4/3/2024)    PRAPARE - Transportation     Lack of Transportation (Medical): No     Lack of Transportation (Non-Medical): No   Physical Activity: Unknown (4/3/2024)    Exercise Vital Sign     Days of Exercise per Week: Patient declined   Housing Stability: Unknown (4/3/2024)    Housing Stability Vital Sign     Unable to Pay for Housing in the Last Year: No     Unstable Housing in the Last Year: No       Objective:      Vitals:    10/22/24 0904   BP: 110/80   Pulse: 64   Temp: 97.9 °F (36.6 °C)   TempSrc: Temporal   SpO2: 97%   Weight: 97.2 kg (214 lb 4.6 oz)   Height: 5' 11" (1.803 m)   PainSc: 0-No pain     Physical Exam:  Constitutional:       Appearance: Normal appearance. He is well-developed. He is obese.   HENT:      Head: Normocephalic.   Eyes: "      Conjunctiva/sclera: Conjunctivae normal.      Pupils: Pupils are equal, round, and reactive to light.   Neck:      Thyroid: No thyromegaly.      Vascular: No carotid bruit or JVD.   Cardiovascular:      Rate and Rhythm: Normal rate and regular rhythm.      Pulses: Normal pulses.      Heart sounds: Normal heart sounds and S1 normal. No murmur heard.    No gallop.   Pulmonary:      Effort: Pulmonary effort is normal.      Breath sounds: Normal breath sounds. No decreased breath sounds or wheezing.   Abdominal:      General: Bowel sounds are normal.      Palpations: Abdomen is soft.      Tenderness: There is no abdominal tenderness. There is no right CVA tenderness or left CVA tenderness.   Musculoskeletal:         General: Normal range of motion.      Cervical back: Normal range of motion and neck supple.      Right lower leg: No edema.      Left lower leg: No edema.      Comments: Gait and coordination normal   Skin:     General: Skin is warm and dry.      Capillary Refill: Capillary refill takes less than 2 seconds.      Findings: No rash.   Neurological:      Mental Status: He is alert and oriented to person, place, and time.   Psychiatric:         Attention and Perception: Attention normal.         Mood and Affect: Mood and affect normal.         Speech: Speech normal.         Behavior: Behavior normal.              Assessment:       1. Annual physical exam    2. Immunization due    3. Hypertriglyceridemia    4. Secondary hypothyroidism    5. Testosterone deficiency    6. Gastroesophageal reflux disease without esophagitis    7. Anxiety    8. Smokeless tobacco use    9. Encounter for long-term (current) use of medications    10. Prostate cancer screening    11. Colon cancer screening    12. Hyperprolactinemia        Assessment & Plan    Reviewed patient's current medications and dosages  Assessed overall health status, noting normal vital signs and absence of concerning symptoms  Considered preventive care  needs, including tetanus vaccination and colorectal cancer screening  Noted patient's weight loss and current physical activity level    HYPERLIPIDEMIA:  - Continued fenofibrate 160 mg tablet daily.    THYROID DISORDER:  - Continued Synthroid 75 mcg daily.    GASTROESOPHAGEAL REFLUX DISEASE (GERD):  - Continued omeprazole as prescribed.    ERECTILE DYSFUNCTION:  - Continued Cialis 5 mg daily while at work, and higher dose as needed when at home.    HYPOGONADISM:  - Continued testosterone 200 mg injection every 10 days.    MUSCLE PAIN/SPASM:  - Continued Flexeril as needed for muscle relaxation.    TETANUS VACCINATION:  - Tdap (tetanus) vaccine administered in office.    COLORECTAL CANCER SCREENING:  - FIT kit ordered for colorectal cancer screening.  - Complete and return FIT kit for colorectal cancer screening.    HYDRATION:  - Khadar to drink more fluids.    PHYSICAL ACTIVITY:  - Khadar to continue current physical activity, including climbing stairs at work and getting steps in.    LABS:  - Blood work ordered.    FOLLOW UP:  - Follow up in 6 months.        Plan:       Annual physical exam    Immunization due  -     influenza (Flulaval, Fluzone, Fluarix) 45 mcg/0.5 mL IM vaccine (> or = 6 mo) 0.5 mL  -     Tdap (BOOSTRIX) vaccine injection 0.5 mL    Hypertriglyceridemia: stable on current medication.  -     Lipid Panel    Secondary hypothyroidism: Will adjust dose of thyroid medication if needed based on lab results  -     TSH    Testosterone deficiency: continue to follow with Urology. Continue testosterone replacement    Gastroesophageal reflux disease without esophagitis: good control with current medication    Anxiety: stable.     Smokeless tobacco use: cessation encouraged.     Encounter for long-term (current) use of medications  -     Hemoglobin A1C  -     Comprehensive Metabolic Panel  -     CBC Auto Differential    Prostate cancer screening  -     PSA, Screening    Colon cancer screening  -     Fecal  Immunochemical Test (iFOBT); Future; Expected date: 10/22/2024    Hyperprolactinemia: stable. Followed by Endocrinology.     Continue current medication  Take medications only as prescribed  Healthy diet, exercise  Adequate rest  Adequate hydration  Avoid allergens  Avoid excessive caffeine     Follow up 6 months.     This note was generated with the assistance of ambient listening technology. Verbal consent was obtained by the patient and accompanying visitor(s) for the recording of patient appointment to facilitate this note. I attest to having reviewed and edited the generated note for accuracy, though some syntax or spelling errors may persist. Please contact the author of this note for any clarification.

## 2024-12-10 ENCOUNTER — TELEPHONE (OUTPATIENT)
Dept: UROLOGY | Facility: CLINIC | Age: 49
End: 2024-12-10
Payer: COMMERCIAL

## 2024-12-10 NOTE — TELEPHONE ENCOUNTER
Called the pt and he did not get his Testosterone level because he had [place] [facility] [place] injections.  He said he is home and will give himself his injection and get his Testosterone level at 7:30 am the morning of his appointment (3 days)   Prescription signed.

## 2024-12-13 ENCOUNTER — OFFICE VISIT (OUTPATIENT)
Dept: UROLOGY | Facility: CLINIC | Age: 49
End: 2024-12-13
Payer: COMMERCIAL

## 2024-12-13 ENCOUNTER — LAB VISIT (OUTPATIENT)
Dept: LAB | Facility: HOSPITAL | Age: 49
End: 2024-12-13
Attending: STUDENT IN AN ORGANIZED HEALTH CARE EDUCATION/TRAINING PROGRAM
Payer: COMMERCIAL

## 2024-12-13 VITALS — WEIGHT: 210.31 LBS | HEIGHT: 71 IN | BODY MASS INDEX: 29.44 KG/M2

## 2024-12-13 DIAGNOSIS — E29.1 HYPOGONADISM MALE: ICD-10-CM

## 2024-12-13 DIAGNOSIS — E29.1 MALE HYPOGONADISM: ICD-10-CM

## 2024-12-13 DIAGNOSIS — R79.89 LOW TESTOSTERONE: ICD-10-CM

## 2024-12-13 DIAGNOSIS — E34.9 TESTOSTERONE DEFICIENCY: ICD-10-CM

## 2024-12-13 DIAGNOSIS — N52.01 ERECTILE DYSFUNCTION DUE TO ARTERIAL INSUFFICIENCY: Primary | ICD-10-CM

## 2024-12-13 LAB — TESTOST SERPL-MCNC: 721 NG/DL (ref 304–1227)

## 2024-12-13 PROCEDURE — 99999 PR PBB SHADOW E&M-EST. PATIENT-LVL III: CPT | Mod: PBBFAC,,,

## 2024-12-13 PROCEDURE — 36415 COLL VENOUS BLD VENIPUNCTURE: CPT | Mod: PO | Performed by: STUDENT IN AN ORGANIZED HEALTH CARE EDUCATION/TRAINING PROGRAM

## 2024-12-13 PROCEDURE — 84403 ASSAY OF TOTAL TESTOSTERONE: CPT | Performed by: STUDENT IN AN ORGANIZED HEALTH CARE EDUCATION/TRAINING PROGRAM

## 2024-12-13 RX ORDER — TADALAFIL 20 MG/1
20 TABLET ORAL
Qty: 15 TABLET | Refills: 11 | Status: SHIPPED | OUTPATIENT
Start: 2024-12-13 | End: 2025-12-13

## 2024-12-13 NOTE — PROGRESS NOTES
[facility] Note  Staff: PRISCILLA Gary    PCP: PEDRITO Strickland    Chief Complaint: Low T F/U    Subjective:        HPI: Khadar Lorenzo is a 49 y.o. male new patient to me presents today for low T follow up. He has been established with urology for >10 years for low T. He was last seen in our office by Dr. Bedolla. He is currently on TRT 200mg IM every 10 days. He injects himself without difficulty. He is overall doing well and has no complaints. He updated his testosterone this AM prior to OV. He denies dysuria, hematuria, fever, flank pain, abd pain, and difficulty urinating.     Questions asked the pt during ov today:  Urgency: no, urge incontinence? no  Dysuria: No  Gross Hematuria:No  Straining:No, Hesistancy:No, Intermittency:No}, Weak stream:No  ED:Yes - cialis works well for him    Last PSA Screening:   Lab Results   Component Value Date    PSA 1.7 10/22/2024    PSA 1.6 10/12/2023    PSA 1.9 06/06/2022    PSADIAG 1.9 08/29/2022    PSADIAG 1.4 05/10/2021    PSADIAG 1.5 02/08/2021       History of Kidney Stones?:  yes    Constipation issues?:  no    REVIEW OF SYSTEMS:  Review of Systems   Constitutional: Negative.  Negative for chills and fever.   Gastrointestinal: Negative.  Negative for abdominal pain, constipation, diarrhea, nausea and vomiting.   Genitourinary: Negative.  Negative for dysuria, flank pain, frequency, hematuria and urgency.   Musculoskeletal: Negative.  Negative for back pain.       PMHx:  Past Medical History:   Diagnosis Date    Anxiety     Depression     GERD (gastroesophageal reflux disease)     Hyperlipidemia     Hypotestosteronemia     Hypothyroidism     Nephrolithiasis     2 episodes    Obesity     Snoring     Syncope and collapse        PSHx:  Past Surgical History:   Procedure Laterality Date    CYST REMOVAL      left wrist    EXCISION, SUPERFICIAL MASS, HEAD N/A 10/18/2023    Procedure: EXCISION, SUPERFICIAL MASS, HEAD;  Surgeon: Fred Calderón,  MD;  Location: [facility] OR;  Service: General;  Laterality: N/A;  posterior scalp    FRACTURE SURGERY      screw in right thumb    testopel      by Dr. Martini in the past for his hypogonadism.    TONSILLECTOMY, ADENOIDECTOMY, BILATERAL MYRINGOTOMY AND TUBES         Fam Hx:   malignancies: No    kidney stones: No     Soc Hx:  , lives in [place]    Allergies:  Patient has no known allergies.    Medications: reviewed     Objective:   There were no vitals filed for this visit.    Physical Exam  Constitutional:       Appearance: Normal appearance.   Pulmonary:      Effort: Pulmonary effort is normal.   Abdominal:      General: There is no distension.      Palpations: Abdomen is soft.      Tenderness: There is no abdominal tenderness.   Musculoskeletal:         General: Normal range of motion.      Cervical back: Normal range of motion.   Neurological:      Mental Status: He is oriented to person, place, and time.   Psychiatric:         Mood and Affect: Mood normal.         Behavior: Behavior normal.           Assessment:       1. Erectile dysfunction due to arterial insufficiency    2. Low testosterone    3. Hypogonadism male          Plan:     Continue TRT 200mg IM every 10 days as prescribed  Refills of cialis 20mg PRN prescribed    F/u annually with labs prior    [facility]: active    PRISCILLA Gary

## 2025-03-26 ENCOUNTER — PATIENT MESSAGE (OUTPATIENT)
Dept: UROLOGY | Facility: CLINIC | Age: 50
End: 2025-03-26
Payer: COMMERCIAL

## 2025-03-26 DIAGNOSIS — N40.1 BENIGN PROSTATIC HYPERPLASIA WITH WEAK URINARY STREAM: Primary | ICD-10-CM

## 2025-03-26 DIAGNOSIS — R39.12 BENIGN PROSTATIC HYPERPLASIA WITH WEAK URINARY STREAM: Primary | ICD-10-CM

## 2025-03-26 DIAGNOSIS — N52.01 ERECTILE DYSFUNCTION DUE TO ARTERIAL INSUFFICIENCY: ICD-10-CM

## 2025-03-26 RX ORDER — TADALAFIL 5 MG/1
5 TABLET ORAL DAILY PRN
Qty: 30 TABLET | Refills: 11 | Status: SHIPPED | OUTPATIENT
Start: 2025-03-26

## 2025-03-26 RX ORDER — TADALAFIL 20 MG/1
20 TABLET ORAL
Qty: 15 TABLET | Refills: 11 | Status: SHIPPED | OUTPATIENT
Start: 2025-03-26 | End: 2026-03-26

## 2025-04-10 ENCOUNTER — PATIENT MESSAGE (OUTPATIENT)
Dept: UROLOGY | Facility: CLINIC | Age: 50
End: 2025-04-10
Payer: COMMERCIAL

## 2025-04-10 DIAGNOSIS — N40.1 BENIGN PROSTATIC HYPERPLASIA WITH WEAK URINARY STREAM: ICD-10-CM

## 2025-04-10 DIAGNOSIS — N52.01 ERECTILE DYSFUNCTION DUE TO ARTERIAL INSUFFICIENCY: ICD-10-CM

## 2025-04-10 DIAGNOSIS — R39.12 BENIGN PROSTATIC HYPERPLASIA WITH WEAK URINARY STREAM: ICD-10-CM

## 2025-04-10 RX ORDER — TADALAFIL 5 MG/1
5 TABLET ORAL DAILY PRN
Qty: 90 TABLET | Refills: 3 | Status: SHIPPED | OUTPATIENT
Start: 2025-04-10 | End: 2026-04-10

## 2025-04-28 ENCOUNTER — OFFICE VISIT (OUTPATIENT)
Dept: FAMILY MEDICINE | Facility: CLINIC | Age: 50
End: 2025-04-28
Payer: COMMERCIAL

## 2025-04-28 VITALS
WEIGHT: 215.81 LBS | BODY MASS INDEX: 30.21 KG/M2 | TEMPERATURE: 98 F | RESPIRATION RATE: 18 BRPM | SYSTOLIC BLOOD PRESSURE: 120 MMHG | HEART RATE: 84 BPM | OXYGEN SATURATION: 95 % | HEIGHT: 71 IN | DIASTOLIC BLOOD PRESSURE: 70 MMHG

## 2025-04-28 DIAGNOSIS — E78.1 HYPERTRIGLYCERIDEMIA: Primary | ICD-10-CM

## 2025-04-28 DIAGNOSIS — E34.9 TESTOSTERONE DEFICIENCY: ICD-10-CM

## 2025-04-28 DIAGNOSIS — E03.8 SECONDARY HYPOTHYROIDISM: ICD-10-CM

## 2025-04-28 DIAGNOSIS — K21.9 GASTROESOPHAGEAL REFLUX DISEASE WITHOUT ESOPHAGITIS: ICD-10-CM

## 2025-04-28 DIAGNOSIS — E66.811 CLASS 1 OBESITY WITHOUT SERIOUS COMORBIDITY WITH BODY MASS INDEX (BMI) OF 30.0 TO 30.9 IN ADULT, UNSPECIFIED OBESITY TYPE: ICD-10-CM

## 2025-04-28 DIAGNOSIS — B35.9 TINEA: ICD-10-CM

## 2025-04-28 DIAGNOSIS — Z72.0 SMOKELESS TOBACCO USE: ICD-10-CM

## 2025-04-28 PROCEDURE — 1159F MED LIST DOCD IN RCRD: CPT | Mod: CPTII,S$GLB,, | Performed by: NURSE PRACTITIONER

## 2025-04-28 PROCEDURE — 3078F DIAST BP <80 MM HG: CPT | Mod: CPTII,S$GLB,, | Performed by: NURSE PRACTITIONER

## 2025-04-28 PROCEDURE — 99213 OFFICE O/P EST LOW 20 MIN: CPT | Mod: S$GLB,,, | Performed by: NURSE PRACTITIONER

## 2025-04-28 PROCEDURE — 1160F RVW MEDS BY RX/DR IN RCRD: CPT | Mod: CPTII,S$GLB,, | Performed by: NURSE PRACTITIONER

## 2025-04-28 PROCEDURE — 3074F SYST BP LT 130 MM HG: CPT | Mod: CPTII,S$GLB,, | Performed by: NURSE PRACTITIONER

## 2025-04-28 PROCEDURE — 3008F BODY MASS INDEX DOCD: CPT | Mod: CPTII,S$GLB,, | Performed by: NURSE PRACTITIONER

## 2025-04-28 RX ORDER — KETOCONAZOLE 20 MG/ML
1 SHAMPOO, SUSPENSION TOPICAL
Qty: 120 ML | Refills: 5 | Status: SHIPPED | OUTPATIENT
Start: 2025-04-28

## 2025-04-28 RX ORDER — KETOCONAZOLE 20 MG/ML
1 SHAMPOO, SUSPENSION TOPICAL
Qty: 120 ML | Refills: 5 | Status: SHIPPED | OUTPATIENT
Start: 2025-04-28 | End: 2025-04-28 | Stop reason: SDUPTHER

## 2025-04-28 NOTE — PROGRESS NOTES
Subjective:       Patient ID: Khadar Lorenzo is a 49 y.o. male.    Chief Complaint: Follow-up    HPI here for follow up. Last labs reviewed, no need to repeat at this time.  Taking medication as prescribed.  He denies any specific concerns today.    Followed by urology low testosterone.  Most recent note reviewed.    Requesting refill of ketoconazole topical shampoo.  Uses this in the summertime for tinea.    See ROS.    The following portion of the patients history was reviewed and updated as appropriate: allergies, current medications, past medical and surgical history. Past social history and problem list reviewed. Family PMH and Past social history reviewed. Tobacco, Illicit drug use reviewed.      Review of patient's allergies indicates:  No Known Allergies    Current Medications[1]    Past Medical History:   Diagnosis Date    Anxiety     Depression     GERD (gastroesophageal reflux disease)     Hyperlipidemia     Hypotestosteronemia     Hypothyroidism     Nephrolithiasis     2 episodes    Obesity     Snoring     Syncope and collapse        Past Surgical History:   Procedure Laterality Date    CYST REMOVAL      left wrist    EXCISION, SUPERFICIAL MASS, HEAD N/A 10/18/2023    Procedure: EXCISION, SUPERFICIAL MASS, HEAD;  Surgeon: Fred Calderón MD;  Location: [facility];  Service: General;  Laterality: N/A;  posterior scalp    FRACTURE SURGERY      screw in right thumb    testopel      by Dr. Martini in the past for his hypogonadism.    TONSILLECTOMY, ADENOIDECTOMY, BILATERAL MYRINGOTOMY AND TUBES         Social History[2]    Review of Systems   Constitutional:  Negative for fatigue and fever.   HENT: Negative.     Eyes:  Negative for visual disturbance.   Respiratory:  Negative for cough, chest tightness, shortness of breath and wheezing.    Cardiovascular:  Negative for chest pain, palpitations and leg swelling.   Gastrointestinal:  Negative for abdominal pain, blood in stool, diarrhea, nausea and  "vomiting.   Genitourinary: Negative.    Musculoskeletal:  Negative for arthralgias, back pain and gait problem.   Skin: Negative.         No rash at this time   Neurological:  Negative for headaches.   Psychiatric/Behavioral:  Negative for dysphoric mood and sleep disturbance. The patient is not nervous/anxious.        Objective:      /70 (BP Location: Left arm, Patient Position: Sitting)   Pulse 84   Temp 98.1 °F (36.7 °C) (Temporal)   Resp 18   Ht 5' 11" (1.803 m)   Wt 97.9 kg (215 lb 13.3 oz)   SpO2 95%   BMI 30.10 kg/m²      Physical Exam  Constitutional:       Appearance: Normal appearance. He is obese.   HENT:      Head: Normocephalic.   Eyes:      Pupils: Pupils are equal, round, and reactive to light.   Neck:      Thyroid: No thyromegaly.      Vascular: No carotid bruit.   Cardiovascular:      Rate and Rhythm: Normal rate and regular rhythm.      Pulses: Normal pulses.      Heart sounds: Normal heart sounds. No murmur heard.  Pulmonary:      Effort: Pulmonary effort is normal.      Breath sounds: Normal breath sounds. No wheezing.   Abdominal:      General: Bowel sounds are normal.      Tenderness: There is no abdominal tenderness.   Musculoskeletal:         General: Normal range of motion.      Cervical back: Normal range of motion.      Right lower leg: No edema.      Left lower leg: No edema.      Comments: Gait normal.  strong, equal   Skin:     General: Skin is warm and dry.      Capillary Refill: Capillary refill takes less than 2 seconds.   Neurological:      General: No focal deficit present.      Mental Status: He is alert.   Psychiatric:         Attention and Perception: Attention and perception normal.         Mood and Affect: Mood and affect normal.         Speech: Speech normal.         Behavior: Behavior normal.         Assessment:       1. Hypertriglyceridemia    2. Secondary hypothyroidism    3. Testosterone deficiency    4. Gastroesophageal reflux disease without " esophagitis    5. Smokeless tobacco use    6. Tinea    7. Class 1 obesity without serious comorbidity with body mass index (BMI) of 30.0 to 30.9 in adult, unspecified obesity type        Plan:       Hypertriglyceridemia:  Tolerating current medication.  Repeat labs at next visit    Secondary hypothyroidism:  Stable on current dose    Testosterone deficiency:  Continue to follow with Urology    Gastroesophageal reflux disease without esophagitis:  Good control on current medication    Smokeless tobacco use:  Cessation encouraged    Tinea:  Prescription refill for p.r.n. use    Class 1 obesity without serious comorbidity with body mass index (BMI) of 30.0 to 30.9 in adult, unspecified obesity type:Weight loss encouraged. Follow low fat, low carb diet. Portion control, exercise.     Other orders  -     Discontinue: ketoconazole (NIZORAL) 2 % shampoo; Apply 1 application  topically twice a week.  Dispense: 120 mL; Refill: 5  -     ketoconazole (NIZORAL) 2 % shampoo; Apply 1 application  topically twice a week.  Dispense: 120 mL; Refill: 5       Continue current medication  Take medications only as prescribed  Healthy diet, exercise  Adequate rest  Adequate hydration  Avoid allergens  Avoid excessive caffeine     Follow up in 6 months         [1]   Current Outpatient Medications:     cyclobenzaprine (FLEXERIL) 10 MG tablet, Take 10 mg by mouth 3 (three) times daily as needed for Muscle spasms., Disp: , Rfl:     fenofibrate 160 MG Tab, Take 1 tablet (160 mg total) by mouth once daily., Disp: 90 tablet, Rfl: 3    HYDROcodone-acetaminophen (NORCO) 7.5-325 mg per tablet, Take 1 tablet by mouth every 6 (six) hours as needed for Pain., Disp: 12 tablet, Rfl: 0    ibuprofen 50 mg/1.25 mL DrpS, ibuprofen, Disp: , Rfl:     levothyroxine (SYNTHROID) 75 MCG tablet, TAKE 1 TABLET BEFORE BREAKFAST, Disp: 90 tablet, Rfl: 0    OLUX-E 0.05 % topical foam, Apply 0.05 % topically continuous prn., Disp: , Rfl:     omeprazole (PRILOSEC) 40  MG capsule, TAKE 1 CAPSULE DAILY, Disp: 90 capsule, Rfl: 0    ondansetron (ZOFRAN-ODT) 8 MG TbDL, , Disp: , Rfl:     tadalafiL (CIALIS) 20 MG Tab, Take 1 tablet (20 mg total) by mouth as needed (ED)., Disp: 15 tablet, Rfl: 11    tadalafiL (CIALIS) 5 MG tablet, Take 1 tablet (5 mg total) by mouth daily as needed for Erectile Dysfunction., Disp: 90 tablet, Rfl: 3    testosterone cypionate (DEPOTESTOTERONE CYPIONATE) 200 mg/mL injection, Inject 1 mL (200 mg total) into the muscle every 10 days., Disp: 10 mL, Rfl: 2  [2]   Social History  Socioeconomic History    Marital status:    Occupational History    Occupation: [employer]   Tobacco Use    Smoking status: Former    Smokeless tobacco: Current     Types: Chew    Tobacco comments:     chew everyday, 0.25 can    Substance and Sexual Activity    Alcohol use: Yes     Alcohol/week: 1.0 standard drink of alcohol     Types: 1 Cans of beer per week     Comment: socially    Drug use: No    Sexual activity: Yes     Partners: Female     Social Drivers of Health     Financial Resource Strain: Low Risk  (4/24/2025)    Overall Financial Resource Strain (CARDIA)     Difficulty of Paying Living Expenses: Not hard at all   Food Insecurity: No Food Insecurity (4/24/2025)    Hunger Vital Sign     Worried About Running Out of Food in the Last Year: Never true     Ran Out of Food in the Last Year: Never true   Transportation Needs: No Transportation Needs (4/24/2025)    PRAPARE - Transportation     Lack of Transportation (Medical): No     Lack of Transportation (Non-Medical): No   Physical Activity: Sufficiently Active (4/24/2025)    Exercise Vital Sign     Days of Exercise per Week: 4 days     Minutes of Exercise per Session: 60 min   Stress: No Stress Concern Present (4/24/2025)    [employer] [place] [employer] - Occupational Stress Questionnaire     Feeling of Stress : Not at all   Housing Stability: Low Risk  (4/24/2025)    Housing Stability Vital Sign      Unable to Pay for Housing in the Last Year: No     Number of Times Moved in the Last Year: 0     Homeless in the Last Year: No

## 2025-07-03 DIAGNOSIS — E03.4 HYPOTHYROIDISM DUE TO ACQUIRED ATROPHY OF THYROID: ICD-10-CM

## 2025-07-03 DIAGNOSIS — E78.1 HYPERTRIGLYCERIDEMIA: ICD-10-CM

## 2025-07-04 RX ORDER — FENOFIBRATE 160 MG/1
160 TABLET ORAL
Qty: 90 TABLET | Refills: 3 | Status: SHIPPED | OUTPATIENT
Start: 2025-07-04

## 2025-07-04 RX ORDER — LEVOTHYROXINE SODIUM 75 UG/1
75 TABLET ORAL
Qty: 90 TABLET | Refills: 3 | Status: SHIPPED | OUTPATIENT
Start: 2025-07-04

## 2025-07-04 RX ORDER — OMEPRAZOLE 40 MG/1
40 CAPSULE, DELAYED RELEASE ORAL
Qty: 90 CAPSULE | Refills: 3 | Status: SHIPPED | OUTPATIENT
Start: 2025-07-04

## 2025-07-08 DIAGNOSIS — E29.1 MALE HYPOGONADISM: ICD-10-CM

## 2025-07-09 RX ORDER — TESTOSTERONE CYPIONATE 200 MG/ML
200 INJECTION, SOLUTION INTRAMUSCULAR
Qty: 10 ML | Refills: 2 | Status: SHIPPED | OUTPATIENT
Start: 2025-07-09

## (undated) DEVICE — TOWEL OR DISP STRL BLUE 4/PK

## (undated) DEVICE — SUT MONOCYRL 4-0 PS2 UND

## (undated) DEVICE — ELECTRODE REM PLYHSV RETURN 9

## (undated) DEVICE — SUT 3-0 VICRYL / SH (J416)

## (undated) DEVICE — ADHESIVE DERMABOND ADVANCED

## (undated) DEVICE — DRAPE LAP T SHT W/ INSTR PAD

## (undated) DEVICE — CLEANER TIP ELECSURG 2X2IN

## (undated) DEVICE — POWDER ARISTA AH 3G

## (undated) DEVICE — GLOVE SURG BIOGEL LATEX SZ 7.5

## (undated) DEVICE — PENCIL ROCKER SWITCH 10FT CORD

## (undated) DEVICE — Device

## (undated) DEVICE — APPLICATOR CHLORAPREP ORN 26ML

## (undated) DEVICE — SEE L#120831